# Patient Record
Sex: MALE | Race: WHITE
[De-identification: names, ages, dates, MRNs, and addresses within clinical notes are randomized per-mention and may not be internally consistent; named-entity substitution may affect disease eponyms.]

---

## 2018-04-26 ENCOUNTER — HOSPITAL ENCOUNTER (OUTPATIENT)
Age: 68
End: 2018-04-26
Payer: COMMERCIAL

## 2018-04-26 DIAGNOSIS — R07.81: Primary | ICD-10-CM

## 2018-04-26 PROCEDURE — 71101 X-RAY EXAM UNILAT RIBS/CHEST: CPT

## 2019-10-15 ENCOUNTER — HOSPITAL ENCOUNTER (OUTPATIENT)
Age: 69
End: 2019-10-15
Payer: COMMERCIAL

## 2019-10-15 DIAGNOSIS — M79.662: Primary | ICD-10-CM

## 2019-10-15 PROCEDURE — 93926 LOWER EXTREMITY STUDY: CPT

## 2019-10-15 PROCEDURE — 93971 EXTREMITY STUDY: CPT

## 2021-09-21 ENCOUNTER — HOSPITAL ENCOUNTER (OUTPATIENT)
Dept: HOSPITAL 22 - RT | Age: 71
End: 2021-09-21
Payer: MEDICARE

## 2021-09-21 DIAGNOSIS — R55: Primary | ICD-10-CM

## 2021-09-21 PROCEDURE — 93270 REMOTE 30 DAY ECG REV/REPORT: CPT

## 2021-09-29 ENCOUNTER — HOSPITAL ENCOUNTER (OUTPATIENT)
Dept: HOSPITAL 22 - RT | Age: 71
End: 2021-09-29
Payer: MEDICARE

## 2021-09-29 DIAGNOSIS — Z86.718: ICD-10-CM

## 2021-09-29 DIAGNOSIS — R55: Primary | ICD-10-CM

## 2021-09-29 DIAGNOSIS — I51.7: ICD-10-CM

## 2021-09-29 DIAGNOSIS — E66.01: ICD-10-CM

## 2021-09-29 PROCEDURE — 93306 TTE W/DOPPLER COMPLETE: CPT

## 2021-09-29 PROCEDURE — 93880 EXTRACRANIAL BILAT STUDY: CPT

## 2022-08-17 ENCOUNTER — OFFICE VISIT (OUTPATIENT)
Dept: FAMILY MEDICINE CLINIC | Facility: CLINIC | Age: 72
End: 2022-08-17

## 2022-08-17 VITALS
HEIGHT: 69 IN | BODY MASS INDEX: 39.28 KG/M2 | RESPIRATION RATE: 20 BRPM | WEIGHT: 265.2 LBS | SYSTOLIC BLOOD PRESSURE: 118 MMHG | OXYGEN SATURATION: 97 % | TEMPERATURE: 98.7 F | HEART RATE: 90 BPM | DIASTOLIC BLOOD PRESSURE: 78 MMHG

## 2022-08-17 DIAGNOSIS — G95.9 CERVICAL MYELOPATHY WITH CERVICAL RADICULOPATHY: Primary | ICD-10-CM

## 2022-08-17 DIAGNOSIS — M54.12 CERVICAL MYELOPATHY WITH CERVICAL RADICULOPATHY: Primary | ICD-10-CM

## 2022-08-17 DIAGNOSIS — S14.2XXD: ICD-10-CM

## 2022-08-17 PROBLEM — S14.2XXA: Status: ACTIVE | Noted: 2022-08-17

## 2022-08-17 PROBLEM — S14.2XXA: Status: RESOLVED | Noted: 2022-08-17 | Resolved: 2022-08-17

## 2022-08-17 PROCEDURE — 99204 OFFICE O/P NEW MOD 45 MIN: CPT | Performed by: FAMILY MEDICINE

## 2022-08-17 RX ORDER — PREDNISONE 20 MG/1
TABLET ORAL
COMMUNITY
Start: 2022-08-03 | End: 2022-09-22

## 2022-08-17 RX ORDER — GABAPENTIN 100 MG/1
100 CAPSULE ORAL 3 TIMES DAILY
Qty: 90 CAPSULE | Refills: 0 | Status: SHIPPED | OUTPATIENT
Start: 2022-08-17 | End: 2022-10-10

## 2022-08-17 RX ORDER — METHOCARBAMOL 500 MG/1
TABLET, FILM COATED ORAL
COMMUNITY
Start: 2022-08-03 | End: 2022-08-17

## 2022-08-17 RX ORDER — NAPROXEN SODIUM 220 MG
220 TABLET ORAL 2 TIMES DAILY PRN
COMMUNITY
End: 2023-03-13

## 2022-08-17 RX ORDER — HYDROCODONE BITARTRATE AND ACETAMINOPHEN 7.5; 325 MG/1; MG/1
TABLET ORAL
COMMUNITY
Start: 2022-08-03 | End: 2022-08-17
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
## 2022-08-22 ENCOUNTER — HOSPITAL ENCOUNTER (OUTPATIENT)
Dept: MRI IMAGING | Facility: HOSPITAL | Age: 72
Discharge: HOME | End: 2022-08-22
Admitting: FAMILY MEDICINE

## 2022-08-22 DIAGNOSIS — M48.02 NEUROFORAMINAL STENOSIS OF CERVICAL SPINE: ICD-10-CM

## 2022-08-22 DIAGNOSIS — S14.2XXD: ICD-10-CM

## 2022-08-22 DIAGNOSIS — M54.12 CERVICAL MYELOPATHY WITH CERVICAL RADICULOPATHY: ICD-10-CM

## 2022-08-22 DIAGNOSIS — G95.9 MYELOPATHY: ICD-10-CM

## 2022-08-22 DIAGNOSIS — G95.9 CERVICAL MYELOPATHY WITH CERVICAL RADICULOPATHY: ICD-10-CM

## 2022-08-22 DIAGNOSIS — M48.02 CERVICAL STENOSIS OF SPINE: Primary | ICD-10-CM

## 2022-08-22 PROCEDURE — 72141 MRI NECK SPINE W/O DYE: CPT

## 2022-09-22 ENCOUNTER — HOSPITAL ENCOUNTER (OUTPATIENT)
Dept: GENERAL RADIOLOGY | Facility: HOSPITAL | Age: 72
Discharge: HOME | End: 2022-09-22
Admitting: NEUROLOGICAL SURGERY

## 2022-09-22 ENCOUNTER — OFFICE VISIT (OUTPATIENT)
Dept: NEUROSURGERY | Facility: CLINIC | Age: 72
End: 2022-09-22

## 2022-09-22 VITALS
DIASTOLIC BLOOD PRESSURE: 82 MMHG | WEIGHT: 264.2 LBS | TEMPERATURE: 97.8 F | BODY MASS INDEX: 39.13 KG/M2 | SYSTOLIC BLOOD PRESSURE: 134 MMHG | HEIGHT: 69 IN

## 2022-09-22 DIAGNOSIS — M54.12 CERVICAL RADICULOPATHY: ICD-10-CM

## 2022-09-22 DIAGNOSIS — M50.30 DDD (DEGENERATIVE DISC DISEASE), CERVICAL: ICD-10-CM

## 2022-09-22 DIAGNOSIS — M50.30 DDD (DEGENERATIVE DISC DISEASE), CERVICAL: Primary | ICD-10-CM

## 2022-09-22 PROCEDURE — 72040 X-RAY EXAM NECK SPINE 2-3 VW: CPT

## 2022-09-22 PROCEDURE — 99204 OFFICE O/P NEW MOD 45 MIN: CPT | Performed by: NEUROLOGICAL SURGERY

## 2022-09-22 RX ORDER — PREGABALIN 75 MG/1
75 CAPSULE ORAL TAKE AS DIRECTED
Qty: 60 CAPSULE | Refills: 1 | Status: SHIPPED | OUTPATIENT
Start: 2022-09-22 | End: 2022-09-26 | Stop reason: SDUPTHER
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
## 2022-09-26 ENCOUNTER — TELEPHONE (OUTPATIENT)
Dept: NEUROSURGERY | Facility: CLINIC | Age: 72
End: 2022-09-26

## 2022-09-26 DIAGNOSIS — M50.30 DDD (DEGENERATIVE DISC DISEASE), CERVICAL: ICD-10-CM

## 2022-09-26 DIAGNOSIS — M54.12 CERVICAL RADICULOPATHY: ICD-10-CM

## 2022-09-26 RX ORDER — PREGABALIN 75 MG/1
75 CAPSULE ORAL 2 TIMES DAILY
Qty: 60 CAPSULE | Refills: 1 | Status: SHIPPED | OUTPATIENT
Start: 2022-09-26 | End: 2022-09-26

## 2022-09-26 RX ORDER — PREGABALIN 75 MG/1
75 CAPSULE ORAL TAKE AS DIRECTED
Qty: 60 CAPSULE | Refills: 1 | Status: SHIPPED | OUTPATIENT
Start: 2022-09-26 | End: 2022-09-26

## 2022-09-26 RX ORDER — PREGABALIN 75 MG/1
75 CAPSULE ORAL 2 TIMES DAILY
Qty: 60 CAPSULE | Refills: 1 | Status: SHIPPED | OUTPATIENT
Start: 2022-09-26 | End: 2023-03-13

## 2022-09-26 NOTE — TELEPHONE ENCOUNTER
Did he ever take lyrica, or are symptoms managed with gabapentin? If he never actually took the lyrica we need to refill the gabapentin instead

## 2022-09-26 NOTE — TELEPHONE ENCOUNTER
I have called the pharmacy and they do have this Rx. Can you resend this please?    Requested Prescriptions     Pending Prescriptions Disp Refills   • pregabalin (LYRICA) 75 MG capsule 60 capsule 1     Sig: Take 1 capsule by mouth Take As Directed. Take 1 tab PO daily x 1 week, BID thereafter  Discontinue Gabapentin     Patient ran out of the Gabapentin and does not have anything to take for the pain.  Please see note below.  Please Advise. Thank you.    DORON:11/30/2021 Gabapentin 100MG 1950 90 [facility] [address] 1  08/03/2022 Hydrocodone/Acetaminophen  325MG/7.5MG  1950 12 3 Neal [facility] [address] 30 1  08/19/2022 Gabapentin 100MG 1950 90 30 Gary Kaur [facility] [address] 1

## 2022-09-26 NOTE — TELEPHONE ENCOUNTER
Caller: Cj Cifuentes    Relationship: Self    Best call back number: [phone]    What is the best time to reach you:ANYTIME    Who are you requesting to speak with (clinical staff, provider,  specific staff member):CLINICAL STAFF    Do you know the name of the person who called:NA    What was the call regarding:PT CALLED TO CHECK ON HIS MEDICATION THAT  PRESCRIBED HIM-LYRICA-PT STATES THAT HE RAN OUT OF GABAPENTIN OVER THE WEEKEND-PT WOULD LIKE A CALLBACK THANK YOU    Do you require a callback:YES

## 2022-09-26 NOTE — TELEPHONE ENCOUNTER
"Attempted to contact patient but no answer. LVM that we sent in an RX for Lyrica.     Do we need to change the sig of Lyrica because patient completed Gabapentin this weekend?    Current sig:    \"Sig: Take 1 capsule by mouth 2 (Two) Times a Day. Take 1 tab PO daily x 1 week, BID thereafter Discontinue Gabapentin\"  "

## 2022-10-10 ENCOUNTER — OFFICE VISIT (OUTPATIENT)
Dept: FAMILY MEDICINE CLINIC | Facility: CLINIC | Age: 72
End: 2022-10-10

## 2022-10-10 VITALS
BODY MASS INDEX: 39.37 KG/M2 | HEIGHT: 69 IN | SYSTOLIC BLOOD PRESSURE: 160 MMHG | RESPIRATION RATE: 20 BRPM | TEMPERATURE: 98.4 F | DIASTOLIC BLOOD PRESSURE: 90 MMHG | HEART RATE: 112 BPM | WEIGHT: 265.8 LBS | OXYGEN SATURATION: 100 %

## 2022-10-10 DIAGNOSIS — R39.15 URINARY URGENCY: ICD-10-CM

## 2022-10-10 DIAGNOSIS — N40.1 ENLARGED PROSTATE WITH URINARY OBSTRUCTION: ICD-10-CM

## 2022-10-10 DIAGNOSIS — E11.65 TYPE 2 DIABETES MELLITUS WITH HYPERGLYCEMIA, WITHOUT LONG-TERM CURRENT USE OF INSULIN: ICD-10-CM

## 2022-10-10 DIAGNOSIS — R81 GLUCOSURIA: ICD-10-CM

## 2022-10-10 DIAGNOSIS — N41.0 ACUTE PROSTATITIS: Primary | ICD-10-CM

## 2022-10-10 DIAGNOSIS — N13.8 ENLARGED PROSTATE WITH URINARY OBSTRUCTION: ICD-10-CM

## 2022-10-10 LAB
BILIRUB BLD-MCNC: NEGATIVE MG/DL
CLARITY, POC: CLEAR
COLOR UR: YELLOW
GLUCOSE BLDC GLUCOMTR-MCNC: 390 MG/DL (ref 70–130)
GLUCOSE UR STRIP-MCNC: ABNORMAL MG/DL
KETONES UR QL: NEGATIVE
LEUKOCYTE EST, POC: NEGATIVE
NITRITE UR-MCNC: NEGATIVE MG/ML
PH UR: 6 [PH] (ref 5–8)
PROT UR STRIP-MCNC: NEGATIVE MG/DL
RBC # UR STRIP: ABNORMAL /UL
SP GR UR: 1.01 (ref 1–1.03)
UROBILINOGEN UR QL: NORMAL

## 2022-10-10 PROCEDURE — 82962 GLUCOSE BLOOD TEST: CPT | Performed by: FAMILY MEDICINE

## 2022-10-10 PROCEDURE — 99214 OFFICE O/P EST MOD 30 MIN: CPT | Performed by: FAMILY MEDICINE

## 2022-10-10 PROCEDURE — 81002 URINALYSIS NONAUTO W/O SCOPE: CPT | Performed by: FAMILY MEDICINE

## 2022-10-10 RX ORDER — LEVOFLOXACIN 500 MG/1
500 TABLET, FILM COATED ORAL DAILY
Qty: 14 TABLET | Refills: 0 | Status: SHIPPED | OUTPATIENT
Start: 2022-10-10 | End: 2022-10-10

## 2022-10-10 RX ORDER — TAMSULOSIN HYDROCHLORIDE 0.4 MG/1
1 CAPSULE ORAL DAILY
Qty: 30 CAPSULE | Refills: 11 | Status: SHIPPED | OUTPATIENT
Start: 2022-10-10 | End: 2022-10-10

## 2022-10-10 RX ORDER — LEVOFLOXACIN 500 MG/1
500 TABLET, FILM COATED ORAL DAILY
Qty: 14 TABLET | Refills: 0 | Status: SHIPPED | OUTPATIENT
Start: 2022-10-10 | End: 2022-10-20 | Stop reason: HOSPADM

## 2022-10-10 RX ORDER — TAMSULOSIN HYDROCHLORIDE 0.4 MG/1
1 CAPSULE ORAL DAILY
Qty: 30 CAPSULE | Refills: 11 | Status: ON HOLD | OUTPATIENT
Start: 2022-10-10 | End: 2022-10-20 | Stop reason: SDUPTHER

## 2022-10-10 NOTE — PROGRESS NOTES
"Chief Complaint   Patient presents with   • low back pain, strong odor to urine & urgency      Since Wed Subjective      Cj Cifuentes is a 72 y.o. who presents for at least 1 week of episodes of incontinence along with poor emptying of the bladder and urinary hesitancy.  Patient has now developed pain in the bilateral flanks.  He denies fevers or chills.  He does have some rectal pain.  He is accompanied by his wife who raises concern over his prostate which apparently she encouraged him to get examined in the past but this has not happened.       Review of Systems    Objective   Vital Signs:  /90   Pulse 112   Temp 98.4 °F (36.9 °C)   Resp 20   Ht 175.3 cm (69.02\")   Wt 121 kg (265 lb 12.8 oz)   SpO2 100%   BMI 39.23 kg/m²             Physical Exam  Cardiovascular:      Rate and Rhythm: Normal rate and regular rhythm.      Pulses: Normal pulses.      Heart sounds: Normal heart sounds.   Pulmonary:      Effort: Pulmonary effort is normal.      Breath sounds: Normal breath sounds.   Genitourinary:     Prostate: Enlarged and tender.   Neurological:      Mental Status: He is alert.          Result Review                     Assessment and Plan  Diagnoses and all orders for this visit:    1. Acute prostatitis (Primary)  -     CBC Auto Differential  -     Discontinue: levoFLOXacin (Levaquin) 500 MG tablet; Take 1 tablet by mouth Daily.  Dispense: 14 tablet; Refill: 0  -     Discontinue: tamsulosin (FLOMAX) 0.4 MG capsule 24 hr capsule; Take 1 capsule by mouth Daily.  Dispense: 30 capsule; Refill: 11  -     tamsulosin (FLOMAX) 0.4 MG capsule 24 hr capsule; Take 1 capsule by mouth Daily.  Dispense: 30 capsule; Refill: 11  -     levoFLOXacin (Levaquin) 500 MG tablet; Take 1 tablet by mouth Daily. Fill ASAP  Dispense: 14 tablet; Refill: 0    2. Urinary urgency  -     POC Urinalysis Dipstick  -     Urine Culture - Urine, Urine, Clean Catch    3. Glucosuria  -     POC Glucose    4. Type 2 diabetes " mellitus with hyperglycemia, without long-term current use of insulin (HCC)  -     Comprehensive Metabolic Panel  -     Lipid Panel  -     Hemoglobin A1c    5. Enlarged prostate with urinary obstruction  -     Discontinue: tamsulosin (FLOMAX) 0.4 MG capsule 24 hr capsule; Take 1 capsule by mouth Daily.  Dispense: 30 capsule; Refill: 11  -     tamsulosin (FLOMAX) 0.4 MG capsule 24 hr capsule; Take 1 capsule by mouth Daily.  Dispense: 30 capsule; Refill: 11    MDM: Moderate complexity  Patient will be started on levofloxacin for his acute prostatitis.  CBC ordered  Patient will be started on tamsulosin for his BPH  Patient is newly diagnosed diabetic.  A1c, lipid panel, CMP has been ordered  While patient is initiating treatment he has been instructed to go to the emergency department should he develop a fever or should his pain increase.  Patient will be contacted with lab results and started on hypoglycemics.        Follow Up  No follow-ups on file.  Patient was given instructions and counseling regarding his condition or for health maintenance advice. Please see specific information pulled into the AVS if appropriate.

## 2022-10-13 LAB
ALBUMIN SERPL-MCNC: 4.6 G/DL (ref 3.8–4.8)
ALBUMIN/GLOB SERPL: 2.1 {RATIO} (ref 1.2–2.2)
ALP SERPL-CCNC: 92 IU/L (ref 44–121)
ALT SERPL-CCNC: 20 IU/L (ref 0–44)
AST SERPL-CCNC: 27 IU/L (ref 0–40)
BACTERIA UR CULT: ABNORMAL
BACTERIA UR CULT: ABNORMAL
BASOPHILS # BLD AUTO: 0 X10E3/UL (ref 0–0.2)
BASOPHILS NFR BLD AUTO: 0 %
BILIRUB SERPL-MCNC: 1.8 MG/DL (ref 0–1.2)
BUN SERPL-MCNC: 34 MG/DL (ref 8–27)
BUN/CREAT SERPL: 12 (ref 10–24)
CALCIUM SERPL-MCNC: 9.3 MG/DL (ref 8.6–10.2)
CHLORIDE SERPL-SCNC: 95 MMOL/L (ref 96–106)
CHOLEST SERPL-MCNC: 196 MG/DL (ref 100–199)
CO2 SERPL-SCNC: 17 MMOL/L (ref 20–29)
CREAT SERPL-MCNC: 2.84 MG/DL (ref 0.76–1.27)
EGFRCR SERPLBLD CKD-EPI 2021: 23 ML/MIN/1.73
EOSINOPHIL # BLD AUTO: 0 X10E3/UL (ref 0–0.4)
EOSINOPHIL NFR BLD AUTO: 0 %
ERYTHROCYTE [DISTWIDTH] IN BLOOD BY AUTOMATED COUNT: 12.7 % (ref 11.6–15.4)
GLOBULIN SER CALC-MCNC: 2.2 G/DL (ref 1.5–4.5)
GLUCOSE SERPL-MCNC: 402 MG/DL (ref 70–99)
HBA1C MFR BLD: 10.3 % (ref 4.8–5.6)
HCT VFR BLD AUTO: 48.9 % (ref 37.5–51)
HDLC SERPL-MCNC: 36 MG/DL
HGB BLD-MCNC: 16.8 G/DL (ref 13–17.7)
IMM GRANULOCYTES # BLD AUTO: 0.1 X10E3/UL (ref 0–0.1)
IMM GRANULOCYTES NFR BLD AUTO: 1 %
LDLC SERPL CALC-MCNC: 109 MG/DL (ref 0–99)
LYMPHOCYTES # BLD AUTO: 1.3 X10E3/UL (ref 0.7–3.1)
LYMPHOCYTES NFR BLD AUTO: 7 %
MCH RBC QN AUTO: 30.1 PG (ref 26.6–33)
MCHC RBC AUTO-ENTMCNC: 34.4 G/DL (ref 31.5–35.7)
MCV RBC AUTO: 88 FL (ref 79–97)
MONOCYTES # BLD AUTO: 1.7 X10E3/UL (ref 0.1–0.9)
MONOCYTES NFR BLD AUTO: 9 %
NEUTROPHILS # BLD AUTO: 16.3 X10E3/UL (ref 1.4–7)
NEUTROPHILS NFR BLD AUTO: 83 %
PLATELET # BLD AUTO: 261 X10E3/UL (ref 150–450)
POTASSIUM SERPL-SCNC: 4.4 MMOL/L (ref 3.5–5.2)
PROT SERPL-MCNC: 6.8 G/DL (ref 6–8.5)
RBC # BLD AUTO: 5.59 X10E6/UL (ref 4.14–5.8)
SODIUM SERPL-SCNC: 132 MMOL/L (ref 134–144)
TRIGL SERPL-MCNC: 299 MG/DL (ref 0–149)
VLDLC SERPL CALC-MCNC: 51 MG/DL (ref 5–40)
WBC # BLD AUTO: 19.5 X10E3/UL (ref 3.4–10.8)

## 2022-10-14 ENCOUNTER — DOCUMENTATION (OUTPATIENT)
Dept: FAMILY MEDICINE CLINIC | Facility: CLINIC | Age: 72
End: 2022-10-14

## 2022-10-16 ENCOUNTER — APPOINTMENT (OUTPATIENT)
Dept: CT IMAGING | Facility: HOSPITAL | Age: 72
End: 2022-10-16

## 2022-10-16 ENCOUNTER — HOSPITAL ENCOUNTER (INPATIENT)
Facility: HOSPITAL | Age: 72
LOS: 4 days | Discharge: HOME | End: 2022-10-20
Attending: STUDENT IN AN ORGANIZED HEALTH CARE EDUCATION/TRAINING PROGRAM | Admitting: INTERNAL MEDICINE

## 2022-10-16 DIAGNOSIS — N13.8 ENLARGED PROSTATE WITH URINARY OBSTRUCTION: ICD-10-CM

## 2022-10-16 DIAGNOSIS — R33.8 ACUTE URINARY RETENTION: ICD-10-CM

## 2022-10-16 DIAGNOSIS — N17.9 ACUTE RENAL FAILURE, UNSPECIFIED ACUTE RENAL FAILURE TYPE: Primary | ICD-10-CM

## 2022-10-16 DIAGNOSIS — N41.0 ACUTE PROSTATITIS: ICD-10-CM

## 2022-10-16 DIAGNOSIS — N39.0 ACUTE UTI (URINARY TRACT INFECTION): ICD-10-CM

## 2022-10-16 DIAGNOSIS — N13.9 OBSTRUCTIVE UROPATHY: ICD-10-CM

## 2022-10-16 DIAGNOSIS — R33.8 BENIGN PROSTATIC HYPERPLASIA WITH URINARY RETENTION: ICD-10-CM

## 2022-10-16 DIAGNOSIS — E11.9 NEWLY DIAGNOSED DIABETES: ICD-10-CM

## 2022-10-16 DIAGNOSIS — N40.1 ENLARGED PROSTATE WITH URINARY OBSTRUCTION: ICD-10-CM

## 2022-10-16 DIAGNOSIS — N40.1 BENIGN PROSTATIC HYPERPLASIA WITH URINARY RETENTION: ICD-10-CM

## 2022-10-16 DIAGNOSIS — E66.9 OBESITY (BMI 30-39.9): Chronic | ICD-10-CM

## 2022-10-16 PROBLEM — E83.39 HYPERPHOSPHATEMIA: Status: ACTIVE | Noted: 2022-10-16

## 2022-10-16 PROBLEM — K46.9 HERNIA, ABDOMINAL: Status: ACTIVE | Noted: 2022-10-16

## 2022-10-16 PROBLEM — E83.41 HYPERMAGNESEMIA: Status: ACTIVE | Noted: 2022-10-16

## 2022-10-16 PROBLEM — N41.9 PROSTATITIS: Status: ACTIVE | Noted: 2022-10-16

## 2022-10-16 PROBLEM — R74.8 ELEVATED LIPASE: Status: ACTIVE | Noted: 2022-10-16

## 2022-10-16 PROBLEM — N40.0 BPH (BENIGN PROSTATIC HYPERPLASIA): Status: ACTIVE | Noted: 2022-10-16

## 2022-10-16 LAB
ALBUMIN SERPL-MCNC: 3 G/DL (ref 3.5–5.2)
ALBUMIN SERPL-MCNC: 3.5 G/DL (ref 3.5–5.2)
ALBUMIN/GLOB SERPL: 1.1 G/DL
ALP SERPL-CCNC: 116 U/L (ref 39–117)
ALT SERPL W P-5'-P-CCNC: 23 U/L (ref 1–41)
ANION GAP SERPL CALCULATED.3IONS-SCNC: 13 MMOL/L (ref 5–15)
ANION GAP SERPL CALCULATED.3IONS-SCNC: 16 MMOL/L (ref 5–15)
AST SERPL-CCNC: 15 U/L (ref 1–40)
ATMOSPHERIC PRESS: ABNORMAL MM[HG]
BACTERIA UR QL AUTO: ABNORMAL /HPF
BASE EXCESS BLDV CALC-SCNC: -3.1 MMOL/L (ref -2–2)
BASOPHILS # BLD AUTO: 0.08 10*3/MM3 (ref 0–0.2)
BASOPHILS NFR BLD AUTO: 0.5 % (ref 0–1.5)
BDY SITE: ABNORMAL
BILIRUB SERPL-MCNC: 0.6 MG/DL (ref 0–1.2)
BILIRUB UR QL STRIP: NEGATIVE
BODY TEMPERATURE: 37 C
BUN SERPL-MCNC: 76 MG/DL (ref 8–23)
BUN SERPL-MCNC: 95 MG/DL (ref 8–23)
BUN/CREAT SERPL: 15.7 (ref 7–25)
BUN/CREAT SERPL: 20.9 (ref 7–25)
CALCIUM SPEC-SCNC: 8.8 MG/DL (ref 8.6–10.5)
CALCIUM SPEC-SCNC: 9.1 MG/DL (ref 8.6–10.5)
CHLORIDE SERPL-SCNC: 87 MMOL/L (ref 98–107)
CHLORIDE SERPL-SCNC: 99 MMOL/L (ref 98–107)
CLARITY UR: CLEAR
CO2 BLDA-SCNC: 24.4 MMOL/L (ref 22–33)
CO2 SERPL-SCNC: 19 MMOL/L (ref 22–29)
CO2 SERPL-SCNC: 21 MMOL/L (ref 22–29)
COHGB MFR BLD: 1.2 %
COLOR UR: YELLOW
CREAT SERPL-MCNC: 3.64 MG/DL (ref 0.76–1.27)
CREAT SERPL-MCNC: 6.06 MG/DL (ref 0.76–1.27)
CREAT UR-MCNC: 39.9 MG/DL
CRP SERPL-MCNC: 19.32 MG/DL (ref 0–0.5)
D-LACTATE SERPL-SCNC: 1.1 MMOL/L (ref 0.5–2)
DEPRECATED RDW RBC AUTO: 36.4 FL (ref 37–54)
EGFRCR SERPLBLD CKD-EPI 2021: 17.2 ML/MIN/1.73
EGFRCR SERPLBLD CKD-EPI 2021: 9.3 ML/MIN/1.73
EOSINOPHIL # BLD AUTO: 0.22 10*3/MM3 (ref 0–0.4)
EOSINOPHIL NFR BLD AUTO: 1.3 % (ref 0.3–6.2)
EPAP: 0
ERYTHROCYTE [DISTWIDTH] IN BLOOD BY AUTOMATED COUNT: 12 % (ref 12.3–15.4)
GLOBULIN UR ELPH-MCNC: 3.3 GM/DL
GLUCOSE BLDC GLUCOMTR-MCNC: 337 MG/DL (ref 70–130)
GLUCOSE BLDC GLUCOMTR-MCNC: 357 MG/DL (ref 70–130)
GLUCOSE BLDC GLUCOMTR-MCNC: 364 MG/DL (ref 70–130)
GLUCOSE BLDC GLUCOMTR-MCNC: 415 MG/DL (ref 70–130)
GLUCOSE SERPL-MCNC: 339 MG/DL (ref 65–99)
GLUCOSE SERPL-MCNC: 484 MG/DL (ref 65–99)
GLUCOSE UR STRIP-MCNC: ABNORMAL MG/DL
HBA1C MFR BLD: 10.6 % (ref 4.8–5.6)
HCO3 BLDV-SCNC: 23 MMOL/L (ref 22–28)
HCT VFR BLD AUTO: 41.8 % (ref 37.5–51)
HGB BLD-MCNC: 15 G/DL (ref 13–17.7)
HGB BLDA-MCNC: 14.3 G/DL (ref 13.5–17.5)
HGB UR QL STRIP.AUTO: ABNORMAL
HOLD SPECIMEN: NORMAL
HYALINE CASTS UR QL AUTO: ABNORMAL /LPF
IMM GRANULOCYTES # BLD AUTO: 0.24 10*3/MM3 (ref 0–0.05)
IMM GRANULOCYTES NFR BLD AUTO: 1.5 % (ref 0–0.5)
INHALED O2 CONCENTRATION: 21 %
IPAP: 0
KETONES UR QL STRIP: NEGATIVE
LEUKOCYTE ESTERASE UR QL STRIP.AUTO: ABNORMAL
LIPASE SERPL-CCNC: 723 U/L (ref 13–60)
LYMPHOCYTES # BLD AUTO: 1.09 10*3/MM3 (ref 0.7–3.1)
LYMPHOCYTES NFR BLD AUTO: 6.6 % (ref 19.6–45.3)
MAGNESIUM SERPL-MCNC: 2.6 MG/DL (ref 1.6–2.4)
MCH RBC QN AUTO: 30.4 PG (ref 26.6–33)
MCHC RBC AUTO-ENTMCNC: 35.9 G/DL (ref 31.5–35.7)
MCV RBC AUTO: 84.6 FL (ref 79–97)
METHGB BLD QL: 0.5 %
MODALITY: ABNORMAL
MONOCYTES # BLD AUTO: 1.51 10*3/MM3 (ref 0.1–0.9)
MONOCYTES NFR BLD AUTO: 9.2 % (ref 5–12)
NEUTROPHILS NFR BLD AUTO: 13.26 10*3/MM3 (ref 1.7–7)
NEUTROPHILS NFR BLD AUTO: 80.9 % (ref 42.7–76)
NITRITE UR QL STRIP: NEGATIVE
NOTE: ABNORMAL
NRBC BLD AUTO-RTO: 0 /100 WBC (ref 0–0.2)
OSMOLALITY SERPL: 318 MOSM/KG (ref 275–295)
OSMOLALITY UR: 316 MOSM/KG (ref 300–1100)
OXYHGB MFR BLDV: 50.8 %
PAW @ PEAK INSP FLOW SETTING VENT: 0 CMH2O
PCO2 BLDV: 44 MM HG (ref 41–51)
PH BLDV: 7.33 PH UNITS (ref 7.31–7.41)
PH UR STRIP.AUTO: <=5 [PH] (ref 5–8)
PHOSPHATE SERPL-MCNC: 4.4 MG/DL (ref 2.5–4.5)
PHOSPHATE SERPL-MCNC: 5.4 MG/DL (ref 2.5–4.5)
PLATELET # BLD AUTO: 159 10*3/MM3 (ref 140–450)
PMV BLD AUTO: 9.9 FL (ref 6–12)
PO2 BLDV: 30.5 MM HG (ref 27–53)
POTASSIUM SERPL-SCNC: 4.6 MMOL/L (ref 3.5–5.2)
POTASSIUM SERPL-SCNC: 4.9 MMOL/L (ref 3.5–5.2)
PROCALCITONIN SERPL-MCNC: 0.21 NG/ML (ref 0–0.25)
PROT SERPL-MCNC: 6.8 G/DL (ref 6–8.5)
PROT UR QL STRIP: NEGATIVE
RBC # BLD AUTO: 4.94 10*6/MM3 (ref 4.14–5.8)
RBC # UR STRIP: ABNORMAL /HPF
REF LAB TEST METHOD: ABNORMAL
SODIUM SERPL-SCNC: 122 MMOL/L (ref 136–145)
SODIUM SERPL-SCNC: 133 MMOL/L (ref 136–145)
SODIUM UR-SCNC: 21 MMOL/L
SP GR UR STRIP: 1.01 (ref 1–1.03)
SQUAMOUS #/AREA URNS HPF: ABNORMAL /HPF
T4 FREE SERPL-MCNC: 0.92 NG/DL (ref 0.93–1.7)
TOTAL RATE: 0 BREATHS/MINUTE
TROPONIN T SERPL-MCNC: 0.02 NG/ML (ref 0–0.03)
TSH SERPL DL<=0.05 MIU/L-ACNC: 6.39 UIU/ML (ref 0.27–4.2)
UROBILINOGEN UR QL STRIP: ABNORMAL
UUN 24H UR-MCNC: 442 MG/DL
WBC # UR STRIP: ABNORMAL /HPF
WBC NRBC COR # BLD: 16.4 10*3/MM3 (ref 3.4–10.8)
WHOLE BLOOD HOLD COAG: NORMAL
WHOLE BLOOD HOLD SPECIMEN: NORMAL

## 2022-10-16 PROCEDURE — 84145 PROCALCITONIN (PCT): CPT | Performed by: STUDENT IN AN ORGANIZED HEALTH CARE EDUCATION/TRAINING PROGRAM

## 2022-10-16 PROCEDURE — 84540 ASSAY OF URINE/UREA-N: CPT | Performed by: INTERNAL MEDICINE

## 2022-10-16 PROCEDURE — 86140 C-REACTIVE PROTEIN: CPT | Performed by: STUDENT IN AN ORGANIZED HEALTH CARE EDUCATION/TRAINING PROGRAM

## 2022-10-16 PROCEDURE — 99285 EMERGENCY DEPT VISIT HI MDM: CPT

## 2022-10-16 PROCEDURE — 99223 1ST HOSP IP/OBS HIGH 75: CPT | Performed by: INTERNAL MEDICINE

## 2022-10-16 PROCEDURE — 82962 GLUCOSE BLOOD TEST: CPT

## 2022-10-16 PROCEDURE — 87086 URINE CULTURE/COLONY COUNT: CPT | Performed by: INTERNAL MEDICINE

## 2022-10-16 PROCEDURE — 36415 COLL VENOUS BLD VENIPUNCTURE: CPT

## 2022-10-16 PROCEDURE — 84300 ASSAY OF URINE SODIUM: CPT | Performed by: INTERNAL MEDICINE

## 2022-10-16 PROCEDURE — 83930 ASSAY OF BLOOD OSMOLALITY: CPT | Performed by: INTERNAL MEDICINE

## 2022-10-16 PROCEDURE — 51702 INSERT TEMP BLADDER CATH: CPT

## 2022-10-16 PROCEDURE — 85025 COMPLETE CBC W/AUTO DIFF WBC: CPT | Performed by: STUDENT IN AN ORGANIZED HEALTH CARE EDUCATION/TRAINING PROGRAM

## 2022-10-16 PROCEDURE — 83935 ASSAY OF URINE OSMOLALITY: CPT | Performed by: INTERNAL MEDICINE

## 2022-10-16 PROCEDURE — 83605 ASSAY OF LACTIC ACID: CPT | Performed by: STUDENT IN AN ORGANIZED HEALTH CARE EDUCATION/TRAINING PROGRAM

## 2022-10-16 PROCEDURE — 63710000001 INSULIN LISPRO (HUMAN) PER 5 UNITS: Performed by: INTERNAL MEDICINE

## 2022-10-16 PROCEDURE — 25010000002 HEPARIN (PORCINE) PER 1000 UNITS: Performed by: INTERNAL MEDICINE

## 2022-10-16 PROCEDURE — 25010000002 CEFTRIAXONE PER 250 MG: Performed by: INTERNAL MEDICINE

## 2022-10-16 PROCEDURE — 82570 ASSAY OF URINE CREATININE: CPT | Performed by: INTERNAL MEDICINE

## 2022-10-16 PROCEDURE — 84100 ASSAY OF PHOSPHORUS: CPT | Performed by: STUDENT IN AN ORGANIZED HEALTH CARE EDUCATION/TRAINING PROGRAM

## 2022-10-16 PROCEDURE — 82805 BLOOD GASES W/O2 SATURATION: CPT

## 2022-10-16 PROCEDURE — 83735 ASSAY OF MAGNESIUM: CPT | Performed by: STUDENT IN AN ORGANIZED HEALTH CARE EDUCATION/TRAINING PROGRAM

## 2022-10-16 PROCEDURE — 74176 CT ABD & PELVIS W/O CONTRAST: CPT

## 2022-10-16 PROCEDURE — 83690 ASSAY OF LIPASE: CPT | Performed by: STUDENT IN AN ORGANIZED HEALTH CARE EDUCATION/TRAINING PROGRAM

## 2022-10-16 PROCEDURE — 63710000001 INSULIN DETEMIR PER 5 UNITS: Performed by: INTERNAL MEDICINE

## 2022-10-16 PROCEDURE — 83036 HEMOGLOBIN GLYCOSYLATED A1C: CPT | Performed by: INTERNAL MEDICINE

## 2022-10-16 PROCEDURE — 84439 ASSAY OF FREE THYROXINE: CPT | Performed by: STUDENT IN AN ORGANIZED HEALTH CARE EDUCATION/TRAINING PROGRAM

## 2022-10-16 PROCEDURE — 84484 ASSAY OF TROPONIN QUANT: CPT | Performed by: STUDENT IN AN ORGANIZED HEALTH CARE EDUCATION/TRAINING PROGRAM

## 2022-10-16 PROCEDURE — 87040 BLOOD CULTURE FOR BACTERIA: CPT | Performed by: STUDENT IN AN ORGANIZED HEALTH CARE EDUCATION/TRAINING PROGRAM

## 2022-10-16 PROCEDURE — 25010000002 CEFTRIAXONE PER 250 MG: Performed by: STUDENT IN AN ORGANIZED HEALTH CARE EDUCATION/TRAINING PROGRAM

## 2022-10-16 PROCEDURE — 84443 ASSAY THYROID STIM HORMONE: CPT | Performed by: STUDENT IN AN ORGANIZED HEALTH CARE EDUCATION/TRAINING PROGRAM

## 2022-10-16 PROCEDURE — 81001 URINALYSIS AUTO W/SCOPE: CPT | Performed by: STUDENT IN AN ORGANIZED HEALTH CARE EDUCATION/TRAINING PROGRAM

## 2022-10-16 PROCEDURE — 80053 COMPREHEN METABOLIC PANEL: CPT | Performed by: STUDENT IN AN ORGANIZED HEALTH CARE EDUCATION/TRAINING PROGRAM

## 2022-10-16 RX ORDER — LEVOFLOXACIN 5 MG/ML
750 INJECTION, SOLUTION INTRAVENOUS ONCE
Status: DISCONTINUED | OUTPATIENT
Start: 2022-10-16 | End: 2022-10-16 | Stop reason: ALTCHOICE

## 2022-10-16 RX ORDER — TRAMADOL HYDROCHLORIDE 50 MG/1
50 TABLET ORAL EVERY 12 HOURS PRN
Status: DISCONTINUED | OUTPATIENT
Start: 2022-10-16 | End: 2022-10-20 | Stop reason: HOSPADM

## 2022-10-16 RX ORDER — SODIUM CHLORIDE 0.9 % (FLUSH) 0.9 %
10 SYRINGE (ML) INJECTION EVERY 12 HOURS SCHEDULED
Status: DISCONTINUED | OUTPATIENT
Start: 2022-10-16 | End: 2022-10-20 | Stop reason: HOSPADM

## 2022-10-16 RX ORDER — SODIUM CHLORIDE 9 MG/ML
75 INJECTION, SOLUTION INTRAVENOUS CONTINUOUS
Status: DISCONTINUED | OUTPATIENT
Start: 2022-10-16 | End: 2022-10-16

## 2022-10-16 RX ORDER — ACETAMINOPHEN 325 MG/1
650 TABLET ORAL EVERY 4 HOURS PRN
Status: DISCONTINUED | OUTPATIENT
Start: 2022-10-16 | End: 2022-10-20 | Stop reason: HOSPADM

## 2022-10-16 RX ORDER — INSULIN LISPRO 100 [IU]/ML
0-7 INJECTION, SOLUTION INTRAVENOUS; SUBCUTANEOUS
Status: DISCONTINUED | OUTPATIENT
Start: 2022-10-16 | End: 2022-10-20 | Stop reason: HOSPADM

## 2022-10-16 RX ORDER — ACETAMINOPHEN 650 MG/1
650 SUPPOSITORY RECTAL EVERY 4 HOURS PRN
Status: DISCONTINUED | OUTPATIENT
Start: 2022-10-16 | End: 2022-10-20 | Stop reason: HOSPADM

## 2022-10-16 RX ORDER — PREGABALIN 25 MG/1
25 CAPSULE ORAL DAILY
Status: DISCONTINUED | OUTPATIENT
Start: 2022-10-16 | End: 2022-10-20 | Stop reason: HOSPADM

## 2022-10-16 RX ORDER — SODIUM CHLORIDE 9 MG/ML
10 INJECTION INTRAVENOUS AS NEEDED
Status: DISCONTINUED | OUTPATIENT
Start: 2022-10-16 | End: 2022-10-20 | Stop reason: HOSPADM

## 2022-10-16 RX ORDER — HEPARIN SODIUM 5000 [USP'U]/ML
5000 INJECTION, SOLUTION INTRAVENOUS; SUBCUTANEOUS EVERY 8 HOURS SCHEDULED
Status: DISCONTINUED | OUTPATIENT
Start: 2022-10-16 | End: 2022-10-20 | Stop reason: HOSPADM

## 2022-10-16 RX ORDER — NICOTINE 21 MG/24HR
1 PATCH, TRANSDERMAL 24 HOURS TRANSDERMAL EVERY 24 HOURS
Status: DISCONTINUED | OUTPATIENT
Start: 2022-10-16 | End: 2022-10-20 | Stop reason: HOSPADM

## 2022-10-16 RX ORDER — TAMSULOSIN HYDROCHLORIDE 0.4 MG/1
0.4 CAPSULE ORAL DAILY
Status: DISCONTINUED | OUTPATIENT
Start: 2022-10-16 | End: 2022-10-20 | Stop reason: HOSPADM

## 2022-10-16 RX ORDER — NYSTATIN 100000 [USP'U]/G
POWDER TOPICAL EVERY 12 HOURS SCHEDULED
Status: DISCONTINUED | OUTPATIENT
Start: 2022-10-16 | End: 2022-10-20 | Stop reason: HOSPADM

## 2022-10-16 RX ORDER — INSULIN LISPRO 100 [IU]/ML
3 INJECTION, SOLUTION INTRAVENOUS; SUBCUTANEOUS
Status: DISCONTINUED | OUTPATIENT
Start: 2022-10-16 | End: 2022-10-20 | Stop reason: HOSPADM

## 2022-10-16 RX ORDER — NICOTINE POLACRILEX 4 MG
15 LOZENGE BUCCAL
Status: DISCONTINUED | OUTPATIENT
Start: 2022-10-16 | End: 2022-10-20 | Stop reason: HOSPADM

## 2022-10-16 RX ORDER — SODIUM CHLORIDE 0.9 % (FLUSH) 0.9 %
10 SYRINGE (ML) INJECTION AS NEEDED
Status: DISCONTINUED | OUTPATIENT
Start: 2022-10-16 | End: 2022-10-20 | Stop reason: HOSPADM

## 2022-10-16 RX ORDER — DEXTROSE MONOHYDRATE 25 G/50ML
25 INJECTION, SOLUTION INTRAVENOUS
Status: DISCONTINUED | OUTPATIENT
Start: 2022-10-16 | End: 2022-10-20 | Stop reason: HOSPADM

## 2022-10-16 RX ORDER — ACETAMINOPHEN 160 MG/5ML
650 SOLUTION ORAL EVERY 4 HOURS PRN
Status: DISCONTINUED | OUTPATIENT
Start: 2022-10-16 | End: 2022-10-20 | Stop reason: HOSPADM

## 2022-10-16 RX ADMIN — INSULIN DETEMIR 5 UNITS: 100 INJECTION, SOLUTION SUBCUTANEOUS at 11:05

## 2022-10-16 RX ADMIN — HEPARIN SODIUM 5000 UNITS: 5000 INJECTION INTRAVENOUS; SUBCUTANEOUS at 14:24

## 2022-10-16 RX ADMIN — INSULIN LISPRO 6 UNITS: 100 INJECTION, SOLUTION INTRAVENOUS; SUBCUTANEOUS at 11:07

## 2022-10-16 RX ADMIN — TAMSULOSIN HYDROCHLORIDE 0.4 MG: 0.4 CAPSULE ORAL at 12:04

## 2022-10-16 RX ADMIN — ACETAMINOPHEN 650 MG: 325 TABLET, FILM COATED ORAL at 14:25

## 2022-10-16 RX ADMIN — Medication 10 ML: at 14:29

## 2022-10-16 RX ADMIN — INSULIN DETEMIR 5 UNITS: 100 INJECTION, SOLUTION SUBCUTANEOUS at 21:52

## 2022-10-16 RX ADMIN — SODIUM CHLORIDE 75 ML/HR: 9 INJECTION, SOLUTION INTRAVENOUS at 14:38

## 2022-10-16 RX ADMIN — NYSTATIN: 100000 POWDER TOPICAL at 05:47

## 2022-10-16 RX ADMIN — SODIUM CHLORIDE 1 G: 900 INJECTION INTRAVENOUS at 07:52

## 2022-10-16 RX ADMIN — TRAMADOL HYDROCHLORIDE 50 MG: 50 TABLET, COATED ORAL at 07:58

## 2022-10-16 RX ADMIN — SODIUM CHLORIDE 1 G: 900 INJECTION INTRAVENOUS at 05:47

## 2022-10-16 RX ADMIN — HEPARIN SODIUM 5000 UNITS: 5000 INJECTION INTRAVENOUS; SUBCUTANEOUS at 21:51

## 2022-10-16 RX ADMIN — NYSTATIN: 100000 POWDER TOPICAL at 21:51

## 2022-10-16 RX ADMIN — INSULIN LISPRO 5 UNITS: 100 INJECTION, SOLUTION INTRAVENOUS; SUBCUTANEOUS at 16:37

## 2022-10-16 RX ADMIN — INSULIN LISPRO 7 UNITS: 100 INJECTION, SOLUTION INTRAVENOUS; SUBCUTANEOUS at 07:53

## 2022-10-16 RX ADMIN — TRAMADOL HYDROCHLORIDE 50 MG: 50 TABLET, COATED ORAL at 21:52

## 2022-10-16 RX ADMIN — Medication 10 ML: at 21:51

## 2022-10-16 RX ADMIN — INSULIN LISPRO 3 UNITS: 100 INJECTION, SOLUTION INTRAVENOUS; SUBCUTANEOUS at 17:10

## 2022-10-16 RX ADMIN — NICOTINE 1 PATCH: 14 PATCH, EXTENDED RELEASE TRANSDERMAL at 12:05

## 2022-10-16 RX ADMIN — PREGABALIN 25 MG: 25 CAPSULE ORAL at 12:05

## 2022-10-16 RX ADMIN — SODIUM CHLORIDE 1000 ML: 9 INJECTION, SOLUTION INTRAVENOUS at 05:45
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
## 2022-10-17 LAB
ALBUMIN SERPL-MCNC: 2.6 G/DL (ref 3.5–5.2)
ANION GAP SERPL CALCULATED.3IONS-SCNC: 11 MMOL/L (ref 5–15)
BACTERIA SPEC AEROBE CULT: NO GROWTH
BASOPHILS # BLD AUTO: 0.08 10*3/MM3 (ref 0–0.2)
BASOPHILS NFR BLD AUTO: 0.6 % (ref 0–1.5)
BUN SERPL-MCNC: 40 MG/DL (ref 8–23)
BUN/CREAT SERPL: 27 (ref 7–25)
CALCIUM SPEC-SCNC: 8.4 MG/DL (ref 8.6–10.5)
CHLORIDE SERPL-SCNC: 101 MMOL/L (ref 98–107)
CO2 SERPL-SCNC: 20 MMOL/L (ref 22–29)
CREAT SERPL-MCNC: 1.48 MG/DL (ref 0.76–1.27)
DEPRECATED RDW RBC AUTO: 38.4 FL (ref 37–54)
EGFRCR SERPLBLD CKD-EPI 2021: 50.6 ML/MIN/1.73
EOSINOPHIL # BLD AUTO: 0.37 10*3/MM3 (ref 0–0.4)
EOSINOPHIL NFR BLD AUTO: 2.8 % (ref 0.3–6.2)
ERYTHROCYTE [DISTWIDTH] IN BLOOD BY AUTOMATED COUNT: 12.1 % (ref 12.3–15.4)
GLUCOSE BLDC GLUCOMTR-MCNC: 186 MG/DL (ref 70–130)
GLUCOSE BLDC GLUCOMTR-MCNC: 232 MG/DL (ref 70–130)
GLUCOSE BLDC GLUCOMTR-MCNC: 237 MG/DL (ref 70–130)
GLUCOSE BLDC GLUCOMTR-MCNC: 245 MG/DL (ref 70–130)
GLUCOSE SERPL-MCNC: 297 MG/DL (ref 65–99)
HCT VFR BLD AUTO: 38.5 % (ref 37.5–51)
HGB BLD-MCNC: 13.8 G/DL (ref 13–17.7)
IMM GRANULOCYTES # BLD AUTO: 0.23 10*3/MM3 (ref 0–0.05)
IMM GRANULOCYTES NFR BLD AUTO: 1.8 % (ref 0–0.5)
LYMPHOCYTES # BLD AUTO: 1.5 10*3/MM3 (ref 0.7–3.1)
LYMPHOCYTES NFR BLD AUTO: 11.5 % (ref 19.6–45.3)
MAGNESIUM SERPL-MCNC: 1.9 MG/DL (ref 1.6–2.4)
MCH RBC QN AUTO: 31.1 PG (ref 26.6–33)
MCHC RBC AUTO-ENTMCNC: 35.8 G/DL (ref 31.5–35.7)
MCV RBC AUTO: 86.7 FL (ref 79–97)
MONOCYTES # BLD AUTO: 1.61 10*3/MM3 (ref 0.1–0.9)
MONOCYTES NFR BLD AUTO: 12.4 % (ref 5–12)
NEUTROPHILS NFR BLD AUTO: 70.9 % (ref 42.7–76)
NEUTROPHILS NFR BLD AUTO: 9.22 10*3/MM3 (ref 1.7–7)
NRBC BLD AUTO-RTO: 0 /100 WBC (ref 0–0.2)
PHOSPHATE SERPL-MCNC: 2.6 MG/DL (ref 2.5–4.5)
PLATELET # BLD AUTO: 179 10*3/MM3 (ref 140–450)
PMV BLD AUTO: 9.9 FL (ref 6–12)
POTASSIUM SERPL-SCNC: 4.1 MMOL/L (ref 3.5–5.2)
RBC # BLD AUTO: 4.44 10*6/MM3 (ref 4.14–5.8)
SODIUM SERPL-SCNC: 132 MMOL/L (ref 136–145)
WBC NRBC COR # BLD: 13.01 10*3/MM3 (ref 3.4–10.8)

## 2022-10-17 PROCEDURE — 63710000001 INSULIN LISPRO (HUMAN) PER 5 UNITS: Performed by: INTERNAL MEDICINE

## 2022-10-17 PROCEDURE — 99233 SBSQ HOSP IP/OBS HIGH 50: CPT | Performed by: INTERNAL MEDICINE

## 2022-10-17 PROCEDURE — 63710000001 INSULIN DETEMIR PER 5 UNITS: Performed by: INTERNAL MEDICINE

## 2022-10-17 PROCEDURE — 83735 ASSAY OF MAGNESIUM: CPT | Performed by: INTERNAL MEDICINE

## 2022-10-17 PROCEDURE — 82962 GLUCOSE BLOOD TEST: CPT

## 2022-10-17 PROCEDURE — 80069 RENAL FUNCTION PANEL: CPT | Performed by: INTERNAL MEDICINE

## 2022-10-17 PROCEDURE — 25010000002 CEFTRIAXONE PER 250 MG: Performed by: INTERNAL MEDICINE

## 2022-10-17 PROCEDURE — 85025 COMPLETE CBC W/AUTO DIFF WBC: CPT | Performed by: INTERNAL MEDICINE

## 2022-10-17 PROCEDURE — 99222 1ST HOSP IP/OBS MODERATE 55: CPT | Performed by: UROLOGY

## 2022-10-17 PROCEDURE — 25010000002 HEPARIN (PORCINE) PER 1000 UNITS: Performed by: INTERNAL MEDICINE

## 2022-10-17 RX ORDER — FINASTERIDE 5 MG/1
5 TABLET, FILM COATED ORAL DAILY
Status: DISCONTINUED | OUTPATIENT
Start: 2022-10-17 | End: 2022-10-20 | Stop reason: HOSPADM

## 2022-10-17 RX ORDER — BUMETANIDE 0.25 MG/ML
2 INJECTION INTRAMUSCULAR; INTRAVENOUS ONCE
Status: COMPLETED | OUTPATIENT
Start: 2022-10-17 | End: 2022-10-17

## 2022-10-17 RX ADMIN — NICOTINE 1 PATCH: 14 PATCH, EXTENDED RELEASE TRANSDERMAL at 12:44

## 2022-10-17 RX ADMIN — NYSTATIN: 100000 POWDER TOPICAL at 10:47

## 2022-10-17 RX ADMIN — FINASTERIDE 5 MG: 5 TABLET, FILM COATED ORAL at 11:00

## 2022-10-17 RX ADMIN — INSULIN LISPRO 3 UNITS: 100 INJECTION, SOLUTION INTRAVENOUS; SUBCUTANEOUS at 12:53

## 2022-10-17 RX ADMIN — TAMSULOSIN HYDROCHLORIDE 0.4 MG: 0.4 CAPSULE ORAL at 10:47

## 2022-10-17 RX ADMIN — SODIUM CHLORIDE 2 G: 900 INJECTION INTRAVENOUS at 06:41

## 2022-10-17 RX ADMIN — HEPARIN SODIUM 5000 UNITS: 5000 INJECTION INTRAVENOUS; SUBCUTANEOUS at 06:41

## 2022-10-17 RX ADMIN — INSULIN LISPRO 3 UNITS: 100 INJECTION, SOLUTION INTRAVENOUS; SUBCUTANEOUS at 10:50

## 2022-10-17 RX ADMIN — INSULIN DETEMIR 5 UNITS: 100 INJECTION, SOLUTION SUBCUTANEOUS at 10:44

## 2022-10-17 RX ADMIN — HEPARIN SODIUM 5000 UNITS: 5000 INJECTION INTRAVENOUS; SUBCUTANEOUS at 21:32

## 2022-10-17 RX ADMIN — PREGABALIN 25 MG: 25 CAPSULE ORAL at 10:47

## 2022-10-17 RX ADMIN — TRAMADOL HYDROCHLORIDE 50 MG: 50 TABLET, COATED ORAL at 13:25

## 2022-10-17 RX ADMIN — ACETAMINOPHEN 650 MG: 325 TABLET, FILM COATED ORAL at 21:32

## 2022-10-17 RX ADMIN — INSULIN LISPRO 2 UNITS: 100 INJECTION, SOLUTION INTRAVENOUS; SUBCUTANEOUS at 17:30

## 2022-10-17 RX ADMIN — INSULIN LISPRO 3 UNITS: 100 INJECTION, SOLUTION INTRAVENOUS; SUBCUTANEOUS at 17:30

## 2022-10-17 RX ADMIN — HEPARIN SODIUM 5000 UNITS: 5000 INJECTION INTRAVENOUS; SUBCUTANEOUS at 13:12

## 2022-10-17 RX ADMIN — BUMETANIDE 2 MG: 0.25 INJECTION, SOLUTION INTRAMUSCULAR; INTRAVENOUS at 13:05

## 2022-10-17 RX ADMIN — INSULIN DETEMIR 5 UNITS: 100 INJECTION, SOLUTION SUBCUTANEOUS at 21:33

## 2022-10-17 RX ADMIN — NYSTATIN: 100000 POWDER TOPICAL at 21:33

## 2022-10-17 RX ADMIN — INSULIN LISPRO 3 UNITS: 100 INJECTION, SOLUTION INTRAVENOUS; SUBCUTANEOUS at 12:52

## 2022-10-17 RX ADMIN — INSULIN LISPRO 3 UNITS: 100 INJECTION, SOLUTION INTRAVENOUS; SUBCUTANEOUS at 10:48
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
## 2022-10-17 NOTE — PLAN OF CARE
Goal Outcome Evaluation:   VSS. Back pain treated PRN as ordered, see BAKARI Mayes in place, output slightly pink. SR/ST. RA. A/ox4. No further complaints at this time.
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
## 2022-10-17 NOTE — CONSULTS
Nutrition Services    Patient Name:  Cj Cifuentes  YOB: 1950  MRN: [id number]  Admit Date:  10/16/2022    Consult received for education for new DM. Pt given material to review, receptive to full explanation of consistent carb diet. Will see 10/18 to complete instruction/offer out-pt svc. Note ÁNGEL resolving and pt w K+ and Phos WNL,Will adjust diet order to cardiac consistent carbohydrate.     Electronically signed by:  Shell Barlow RD  10/17/22 19:20 EDT
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
## 2022-10-17 NOTE — PLAN OF CARE
Goal Outcome Evaluation  Plan for home with spouse.  May go home with carbone and antibiotics.  BS improving.  Bumex 2 mg IV given x 1 today with good output; urine clearing pink tint. Edema improving .

## 2022-10-17 NOTE — PAYOR COMM NOTE
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 Microscopy    Sodium, Urine, Random - Urine, Clean Catch    Collection Time: 10/16/22  4:34 AM    Specimen: Urine, Clean Catch   Result Value Ref Range    Sodium, Urine 21 mmol/L   Osmolality, Urine - Urine, Clean Catch    Collection Time: 10/16/22  4:34 AM    Specimen: Urine, Clean Catch   Result Value Ref Range    Osmolality, Urine 316 300 - 1,100 mOsm/kg   Blood Gas, Venous With Co-Ox    Collection Time: 10/16/22  6:03 AM    Specimen: Venous Blood   Result Value Ref Range    Site Nurse/Dr Draw     pH, Venous 7.327 7.310 - 7.410 pH Units    pCO2, Venous 44.0 41.0 - 51.0 mm Hg    pO2, Venous 30.5 27.0 - 53.0 mm Hg    HCO3, Venous 23.0 22.0 - 28.0 mmol/L    Base Excess, Venous -3.1 (L) -2.0 - 2.0 mmol/L    Hemoglobin, Blood Gas 14.3 13.5 - 17.5 g/dL    Oxyhemoglobin Venous 50.8 %    Methemoglobin Venous 0.5 %    Carboxyhemoglobin Venous 1.2 %    CO2 Content 24.4 22 - 33 mmol/L    Temperature 37.0 C    Barometric Pressure for Blood Gas      Modality Room Air     FIO2 21 %    Rate 0 Breaths/minute    PIP 0 cmH2O    IPAP 0     EPAP 0     Note      POC Glucose Once    Collection Time: 10/16/22  8:59 AM    Specimen: Blood   Result Value Ref Range    Glucose 415 (C) 70 - 130 mg/dL   POC Glucose Once    Collection Time: 10/16/22 10:11 AM    Specimen: Blood   Result Value Ref Range    Glucose 357 (H) 70 - 130 mg/dL   Renal Function Panel    Collection Time: 10/16/22  3:08 PM    Specimen: Blood   Result Value Ref Range    Glucose 339 (H) 65 - 99 mg/dL    BUN 76 (H) 8 - 23 mg/dL    Creatinine 3.64 (H) 0.76 - 1.27 mg/dL    Sodium 133 (L) 136 - 145 mmol/L    Potassium 4.6 3.5 - 5.2 mmol/L    Chloride 99 98 - 107 mmol/L    CO2 21.0 (L) 22.0 - 29.0 mmol/L    Calcium 8.8 8.6 - 10.5 mg/dL    Albumin 3.00 (L) 3.50 - 5.20 g/dL    Phosphorus 4.4 2.5 - 4.5 mg/dL    Anion Gap 13.0 5.0 - 15.0 mmol/L    BUN/Creatinine Ratio 20.9 7.0 - 25.0    eGFR 17.2 (L) >60.0 mL/min/1.73   POC Glucose Once    Collection Time: 10/16/22  4:14 PM    Specimen: Blood   Result Value Ref Range    Glucose 337 (H) 70 - 130 mg/dL       If labs were ordered, I independently reviewed the results.        RADIOLOGY  CT Abdomen Pelvis Without Contrast    Result Date: 10/16/2022  CT OF THE ABDOMEN AND PELVIS WITHOUT CONTRAST Clinical indication: Acute urinary retention, abdominal distention and acute renal failure Comparison: None. Procedure: Noncontrast CT images of the abdomen and pelvis were obtained.  CT dose lowering techniques were used, to include: automated exposure control, adjustment for patient size, and or use of iterative reconstruction. Findings: Lung Bases: Small ill-defined groundglass opacity in the posterior right lower lobe. No effusion or pneumothorax. No significant pericardial fluid. Liver and biliary system: The liver is normal in size and configuration without focal lesion. No intra or extrahepatic biliary ductal dilatation is noted.  The gallbladder is normal without stones, wall thickening or adjacent fluid. Pancreas: The pancreas is unremarkable. Spleen: The spleen is normal.  Adrenals: The adrenal glands are within normal limits. Kidneys: Mild hydronephrosis bilaterally. Large left renal cysts measuring up to 8.3 cm. No obstructing stone is identified. Nonobstructive right renal calculus measuring 3 mm on coronal images. Gastrointestinal: The stomach and scattered loops of small and large bowel have a nonobstructive pattern. No focal mucosal lesion or inflammatory changes are seen.  The appendix is normal in the right lower quadrant. Mesentery and retroperitoneum: No mesenteric or retroperitoneal adenopathy.  No abnormal fluid collection, mass or free air.  Tortuosity of the visualized aorta and iliac vessels, though otherwise normal in course and caliber. Scattered atherosclerotic vascular calcifications. Pelvis: The bladder is collapsed around a Mayes catheter. Rectum is normal. No free fluid. No deep pelvic or inguinal adenopathy. Iliac vessels are grossly normal. Reproductive: The prostate is enlarged. Body wall: Fat-containing umbilical hernia. Bones: No acute osseous abnormality.     Impression: 1.  Mild bilateral urinary tract dilatation without obvious obstructing mass or stone. Nonobstructive right renal calculus. Mayes catheter in place. Consider possible infectious or inflammatory process. 2.  Prostatomegaly. 3.  Fat-containing umbilical hernia. 4.  Bladder is decompressed around a Mayes catheter. Electronically signed by:  Geovanna Lima D.O.  10/16/2022 4:13 AM Mountain Time      I ordered and reviewed the above noted radiographic studies.      I viewed images of urinary tract dilation bilaterally worse on the left than right with significant hydronephrosis.  Right-sided kidney stone, fat-containing umbilical hernia, and the largest prostate I have ever seen on imaging read by the radiologist as prostatomegaly.    See radiologist's dictation for official interpretation.        PROCEDURES    Procedures    No orders to display       MEDICATIONS GIVEN IN ER    Medications   Sodium  Chloride (PF) 0.9 % 10 mL (has no administration in time range)   nystatin (MYCOSTATIN) powder ( Topical Given 10/16/22 0580)   pregabalin (LYRICA) capsule 25 mg (25 mg Oral Given 10/16/22 1205)   tamsulosin (FLOMAX) 24 hr capsule 0.4 mg (0.4 mg Oral Given 10/16/22 1204)   sodium chloride 0.9 % flush 10 mL (10 mL Intravenous Given 10/16/22 1429)   sodium chloride 0.9 % flush 10 mL (has no administration in time range)   heparin (porcine) 5000 UNIT/ML injection 5,000 Units (5,000 Units Subcutaneous Given 10/16/22 1424)   acetaminophen (TYLENOL) tablet 650 mg (650 mg Oral Given 10/16/22 1425)     Or   acetaminophen (TYLENOL) 160 MG/5ML solution 650 mg ( Oral Not Given:  See Alt 10/16/22 1425)     Or   acetaminophen (TYLENOL) suppository 650 mg ( Rectal Not Given:  See Alt 10/16/22 1425)   nicotine (NICODERM CQ) 14 MG/24HR patch 1 patch ( Transdermal Canceled Entry 10/16/22 1613)   dextrose (GLUTOSE) oral gel 15 g (has no administration in time range)   dextrose (D50W) (25 g/50 mL) IV injection 25 g (has no administration in time range)   glucagon (human recombinant) (GLUCAGEN DIAGNOSTIC) injection 1 mg (has no administration in time range)   Insulin Lispro (humaLOG) injection 0-7 Units (5 Units Subcutaneous Given 10/16/22 1637)   traMADol (ULTRAM) tablet 50 mg (50 mg Oral Given 10/16/22 0758)   cefTRIAXone (ROCEPHIN) 2 g/100 mL 0.9% NS IVPB (MBP) (has no administration in time range)   insulin detemir (LEVEMIR) injection 5 Units (5 Units Subcutaneous Given 10/16/22 1105)   Insulin Lispro (humaLOG) injection 3 Units (3 Units Subcutaneous Given 10/16/22 1710)   sodium chloride 0.9 % bolus 1,000 mL (0 mL Intravenous Stopped 10/16/22 0715)   cefTRIAXone (ROCEPHIN) 1 g/100 mL 0.9% NS (MBP) (0 g Intravenous Stopped 10/16/22 0618)   cefTRIAXone (ROCEPHIN) 1 g/100 mL 0.9% NS (MBP) (0 g Intravenous Stopped 10/16/22 0824)         PROGRESS, DATA ANALYSIS, CONSULTS, AND MEDICAL DECISION MAKING    All labs have been independently  reviewed by me.  All radiology studies have been reviewed by me and the radiologist dictating the report.   EKG's have been independently viewed and interpreted by me.      He arrived uncomfortable, ill-appearing, with abdominal and pelvic distention and in obvious pain.  Initial bladder scan was just undetectably high red is more than 999 mL.  Immediate Mayes catheter was placed with immediate improvement in his pain, distention his umbilical hernia self reduced almost immediately after placement of the catheter.  Immediate return of 3 L of bloody urine.  He has significant leukocytosis with neutrophilic predominance started on ceftriaxone.  CMP shows profound hyperglycemia at 484 starting on IV fluids.  He has significant hyponatremia and hypochloremia and appears to be in acute renal failure with a creatinine of 6.06 and no history of renal disease.  Lipase is also significantly elevated at 723 he does not have epigastric pain but at this point is clearly coming into the hospital anyway we will keep IV fluids and nausea medications going.  Very mild hypothyroidism potentially stress response.  Urinalysis demonstrates gross hematuria a sending urine culture.  CT scan of the abdomen pelvis shows impressive enlarged prostate and urinary tract dilation presumably from the post bladder obstruction that has now been resolved.  Discussed with Dr. Cheung and the medicine team who agrees he needs hospital admission likely urology and nephrology evaluation and further fluid resuscitation.  Admitted in stable condition.         40 minutes of critical care provided. This time excludes other billable procedures. Time does include preparation of documents, medical consultations, review of old records, and direct bedside care. Patient is at high risk for life-threatening deterioration due to acute renal failure from obstructive uropathy from prostatomegaly with urinary retention.       AS OF 19:45 EDT VITALS:    BP - 144/79  HR  - 106  TEMP - 98.5 °F (36.9 °C) (Oral)  O2 SATS - 92%                  DIAGNOSIS  Final diagnoses:   Acute renal failure, unspecified acute renal failure type (HCC)   Obstructive uropathy   Acute prostatitis   Acute UTI (urinary tract infection)   Benign prostatic hyperplasia with urinary retention   Obesity (BMI 30-39.9)         DISPOSITION  Admit             Florencio Stephens MD  10/1950      Electronically signed by Florencio Stephens MD at 10/1950       Vital Signs (last 2 days)     Date/Time Temp Temp src Pulse Resp BP Patient Position SpO2    10/17/22 0900 -- -- 93 -- -- -- 92    10/17/22 0700 98.1 (36.7) Oral 81 18 135/90 Lying 94    10/17/22 0310 98.7 (37.1) Oral 88 16 128/59 Lying 91    10/16/22 2308 98 (36.7) Oral 95 18 133/89 Lying 93    10/16/22 2021 98.3 (36.8) Oral 111 18 146/86 Lying 92    10/16/22 1700 -- -- 106 -- -- -- 92    10/16/22 1500 98.5 (36.9) Oral 101 18 144/79 Lying 89    10/16/22 1300 -- -- 89 -- -- -- 93    10/16/22 1100 -- -- 83 -- -- -- 92    10/16/22 1023 98.3 (36.8) Oral 85 18 160/93 Lying 92    10/16/22 1002 98 (36.7) Oral 89 16 170/99 Lying 93    10/16/22 0900 -- -- -- -- 166/97 -- --    10/16/22 06:25:54 -- -- 92 16 167/97 Sitting 94    10/16/22 0401 98.2 (36.8) Oral 95 16 168/99 Sitting 94        Oxygen Therapy (last 2 days)     Date/Time SpO2 Device (Oxygen Therapy) Flow (L/min) Oxygen Concentration (%) ETCO2 (mmHg)    10/17/22 0900 92 -- -- -- --    10/17/22 0700 94 -- -- -- --    10/17/22 0655 -- nasal cannula 2 -- --    10/17/22 0455 -- nasal cannula 2 -- --    10/17/22 0310 91 nasal cannula -- -- --    10/17/22 0256 -- nasal cannula 2 -- --    10/17/22 0130 -- nasal cannula 2 -- --    10/17/22 0016 -- room air -- -- --    10/16/22 2308 93 room air -- -- --    10/16/22 2203 -- room air -- -- --    10/16/22 2021 92 room air -- -- --    10/16/22 2000 -- room air -- -- --    10/16/22 1700 92 -- -- -- --    10/16/22 1600 -- room air -- -- --    10/16/22 1500  89 -- -- -- --    10/16/22 1400 -- room air -- -- --    10/16/22 1300 93 -- -- -- --    10/16/22 1200 -- room air -- -- --    10/16/22 1100 92 -- -- -- --    10/16/22 1023 92 room air -- -- --    10/16/22 1002 93 -- -- -- --    10/16/22 06:25:54 94 room air -- -- --    10/16/22 0401 94 -- -- -- --          Current Facility-Administered Medications   Medication Dose Route Frequency Provider Last Rate Last Admin   • acetaminophen (TYLENOL) tablet 650 mg  650 mg Oral Q4H PRN Cris Cheung, DO   650 mg at 10/16/22 1425    Or   • acetaminophen (TYLENOL) 160 MG/5ML solution 650 mg  650 mg Oral Q4H PRN Cris Cheung G, DO        Or   • acetaminophen (TYLENOL) suppository 650 mg  650 mg Rectal Q4H PRN Cris Cheung G, DO       • cefTRIAXone (ROCEPHIN) 2 g/100 mL 0.9% NS IVPB (MBP)  2 g Intravenous Q24H Giovani Morales DO   2 g at 10/17/22 0641   • dextrose (D50W) (25 g/50 mL) IV injection 25 g  25 g Intravenous Q15 Min PRN Cris Cheung G, DO       • dextrose (GLUTOSE) oral gel 15 g  15 g Oral Q15 Min PRN Cris Cheung, DO       • finasteride (PROSCAR) tablet 5 mg  5 mg Oral Daily Pardeep Fry MD       • glucagon (human recombinant) (GLUCAGEN DIAGNOSTIC) injection 1 mg  1 mg Intramuscular Q15 Min PRN Cris Cheung, DO       • heparin (porcine) 5000 UNIT/ML injection 5,000 Units  5,000 Units Subcutaneous Q8H Cris Cheung, DO   5,000 Units at 10/17/22 0641   • insulin detemir (LEVEMIR) injection 5 Units  5 Units Subcutaneous Q12H Giovani Morales DO   5 Units at 10/16/22 2152   • Insulin Lispro (humaLOG) injection 0-7 Units  0-7 Units Subcutaneous TID AC Cris Cheung, DO   5 Units at 10/16/22 1637   • Insulin Lispro (humaLOG) injection 3 Units  3 Units Subcutaneous TID With Meals Giovani Morales, DO   3 Units at 10/16/22 1710   • nicotine (NICODERM CQ) 14 MG/24HR patch 1 patch  1 patch Transdermal Q24H Cris Cheung DO   1 patch at 10/16/22 1205   • nystatin (MYCOSTATIN) powder    Topical Q12H Skye, Cris G, DO   Given at 10/16/22 2151   • pregabalin (LYRICA) capsule 25 mg  25 mg Oral Daily Skye, Cris G, DO   25 mg at 10/16/22 1205   • Sodium Chloride (PF) 0.9 % 10 mL  10 mL Intravenous PRN Skye, Cris G, DO       • sodium chloride 0.9 % flush 10 mL  10 mL Intravenous Q12H Skye, Cris G, DO   10 mL at 10/16/22 2151   • sodium chloride 0.9 % flush 10 mL  10 mL Intravenous PRN Skye, Cris G, DO       • tamsulosin (FLOMAX) 24 hr capsule 0.4 mg  0.4 mg Oral Daily Skye, Cris G, DO   0.4 mg at 10/16/22 1204   • traMADol (ULTRAM) tablet 50 mg  50 mg Oral Q12H PRN Skye, Cris G, DO   50 mg at 10/16/22 2152     Lab Results (last 48 hours)     Procedure Component Value Units Date/Time    Urine Culture - Urine, Urine, Clean Catch [[id number]]  (Normal) Collected: 10/16/22 0434    Specimen: Urine, Clean Catch Updated: 10/17/22 0921     Urine Culture No growth    POC Glucose Once [[id number]]  (Abnormal) Collected: 10/17/22 0704    Specimen: Blood Updated: 10/17/22 0705     Glucose 232 mg/dL      Comment: Meter: [id number] : [id number] Leon Galeas       Blood Culture - Blood, Arm, Right [[id number]]  (Normal) Collected: 10/16/22 0541    Specimen: Blood from Arm, Right Updated: 10/17/22 0701     Blood Culture No growth at 24 hours    Blood Culture - Blood, Arm, Right [[id number]]  (Normal) Collected: 10/16/22 0541    Specimen: Blood from Arm, Right Updated: 10/17/22 0701     Blood Culture No growth at 24 hours    Magnesium [[id number]]  (Normal) Collected: 10/17/22 0420    Specimen: Blood Updated: 10/17/22 0539     Magnesium 1.9 mg/dL     Renal Function Panel [[id number]]  (Abnormal) Collected: 10/17/22 0420    Specimen: Blood Updated: 10/17/22 0539     Glucose 297 mg/dL      BUN 40 mg/dL      Creatinine 1.48 mg/dL      Sodium 132 mmol/L      Potassium 4.1 mmol/L      Chloride 101 mmol/L      CO2 20.0 mmol/L      Calcium 8.4 mg/dL      Albumin 2.60 g/dL      Phosphorus 2.6 mg/dL      Anion  Gap 11.0 mmol/L      BUN/Creatinine Ratio 27.0     eGFR 50.6 mL/min/1.73      Comment: National Kidney Foundation and American Society of Nephrology (ASN) Task Force recommended calculation based on the Chronic Kidney Disease Epidemiology Collaboration (CKD-EPI) equation refit without adjustment for race.       Narrative:      GFR Normal >60  Chronic Kidney Disease <60  Kidney Failure <15      CBC & Differential [[id number]]  (Abnormal) Collected: 10/17/22 0420    Specimen: Blood Updated: 10/17/22 0512    Narrative:      The following orders were created for panel order CBC & Differential.  Procedure                               Abnormality         Status                     ---------                               -----------         ------                     CBC Auto Differential[[id number]]        Abnormal            Final result                 Please view results for these tests on the individual orders.    CBC Auto Differential [[id number]]  (Abnormal) Collected: 10/17/22 0420    Specimen: Blood Updated: 10/17/22 0512     WBC 13.01 10*3/mm3      RBC 4.44 10*6/mm3      Hemoglobin 13.8 g/dL      Hematocrit 38.5 %      MCV 86.7 fL      MCH 31.1 pg      MCHC 35.8 g/dL      RDW 12.1 %      RDW-SD 38.4 fl      MPV 9.9 fL      Platelets 179 10*3/mm3      Neutrophil % 70.9 %      Lymphocyte % 11.5 %      Monocyte % 12.4 %      Eosinophil % 2.8 %      Basophil % 0.6 %      Immature Grans % 1.8 %      Neutrophils, Absolute 9.22 10*3/mm3      Lymphocytes, Absolute 1.50 10*3/mm3      Monocytes, Absolute 1.61 10*3/mm3      Eosinophils, Absolute 0.37 10*3/mm3      Basophils, Absolute 0.08 10*3/mm3      Immature Grans, Absolute 0.23 10*3/mm3      nRBC 0.0 /100 WBC     Urea Nitrogen, Urine - Urine, Clean Catch [[id number]] Collected: 10/16/22 0434    Specimen: Urine, Clean Catch Updated: 10/16/22 2118     Urea Nitrogen, Urine 442 mg/dL     Narrative:      Reference intervals for random urine have not been established.  Clinical  usage is dependent upon physician's interpretation in combination with other laboratory tests.       Creatinine, Urine, Random - Urine, Clean Catch [[id number]] Collected: 10/16/22 0434    Specimen: Urine, Clean Catch Updated: 10/16/22 2118     Creatinine, Urine 39.9 mg/dL     Narrative:      Reference intervals for random urine have not been established.  Clinical usage is dependent upon physician's interpretation in combination with other laboratory tests.       POC Glucose Once [[id number]]  (Abnormal) Collected: 10/16/22 2052    Specimen: Blood Updated: 10/16/22 2055     Glucose 364 mg/dL      Comment: Meter: [id number] : [id number] Karlos Jacob       POC Glucose Once [[id number]]  (Abnormal) Collected: 10/16/22 1614    Specimen: Blood Updated: 10/16/22 1616     Glucose 337 mg/dL      Comment: Meter: [id number] : [id number] Leon Galeas       Renal Function Panel [[id number]]  (Abnormal) Collected: 10/16/22 1508    Specimen: Blood Updated: 10/16/22 1602     Glucose 339 mg/dL      BUN 76 mg/dL      Creatinine 3.64 mg/dL      Sodium 133 mmol/L      Potassium 4.6 mmol/L      Chloride 99 mmol/L      CO2 21.0 mmol/L      Calcium 8.8 mg/dL      Albumin 3.00 g/dL      Phosphorus 4.4 mg/dL      Anion Gap 13.0 mmol/L      BUN/Creatinine Ratio 20.9     eGFR 17.2 mL/min/1.73      Comment: National Kidney Foundation and American Society of Nephrology (ASN) Task Force recommended calculation based on the Chronic Kidney Disease Epidemiology Collaboration (CKD-EPI) equation refit without adjustment for race.       Narrative:      GFR Normal >60  Chronic Kidney Disease <60  Kidney Failure <15      Osmolality, Urine - Urine, Clean Catch [[id number]]  (Normal) Collected: 10/16/22 0434    Specimen: Urine, Clean Catch Updated: 10/16/22 1319     Osmolality, Urine 316 mOsm/kg     Osmolality, Serum [[id number]]  (Abnormal) Collected: 10/16/22 0420    Specimen: Blood Updated: 10/16/22 1319     Osmolality 318 mOsm/kg     Hemoglobin A1c  [[id number]]  (Abnormal) Collected: 10/16/22 0420    Specimen: Blood Updated: 10/16/22 1159     Hemoglobin A1C 10.60 %     Narrative:      Hemoglobin A1C Ranges:    Increased Risk for Diabetes  5.7% to 6.4%  Diabetes                     >= 6.5%  Diabetic Goal                < 7.0%    Sodium, Urine, Random - Urine, Clean Catch [[id number]] Collected: 10/16/22 0434    Specimen: Urine, Clean Catch Updated: 10/16/22 1157     Sodium, Urine 21 mmol/L     Narrative:      Reference intervals for random urine have not been established.  Clinical usage is dependent upon physician's interpretation in combination with other laboratory tests.       POC Glucose Once [[id number]]  (Abnormal) Collected: 10/16/22 1011    Specimen: Blood Updated: 10/16/22 1012     Glucose 357 mg/dL      Comment: Meter: [id number] : [id number] Leon Galeas       POC Glucose Once [[id number]]  (Abnormal) Collected: 10/16/22 0859    Specimen: Blood Updated: 10/16/22 0911     Glucose 415 mg/dL      Comment: Result Not Confirmed Meter: [id number] : [id number] Lizzy Cherise       [place] Draw [[id number]] Collected: 10/16/22 0420    Specimen: Blood Updated: 10/16/22 0830    Narrative:      The following orders were created for panel order [place] Draw.  Procedure                               Abnormality         Status                     ---------                               -----------         ------                     Green Top (Gel)[[id number]]                                  Final result               Lavender Top[[id number]]                                     Final result               Gold Top - SST[[id number]]                                   Final result               Broussard Top[[id number]]                                         Final result               Light Blue Top[[id number]]                                   Final result                 Please view results for these tests on the individual orders.    Gray Top [[id number]] Collected:  "10/16/22 0420    Specimen: Blood Updated: 10/16/22 0830     Extra Tube Hold for add-ons.     Comment: Auto resulted.       Procalcitonin [[id number]]  (Normal) Collected: 10/16/22 0420    Specimen: Blood Updated: 10/16/22 0619     Procalcitonin 0.21 ng/mL     Narrative:      As a Marker for Sepsis (Non-Neonates):    1. <0.5 ng/mL represents a low risk of severe sepsis and/or septic shock.  2. >2 ng/mL represents a high risk of severe sepsis and/or septic shock.    As a Marker for Lower Respiratory Tract Infections that require antibiotic therapy:    PCT on Admission    Antibiotic Therapy       6-12 Hrs later    >0.5                Strongly Recommended  >0.25 - <0.5        Recommended   0.1 - 0.25          Discouraged              Remeasure/reassess PCT  <0.1                Strongly Discouraged     Remeasure/reassess PCT    As 28 day mortality risk marker: \"Change in Procalcitonin Result\" (>80% or <=80%) if Day 0 (or Day 1) and Day 4 values are available. Refer to http://www.[email or web address]pct[facility]calculator.[email or web address]    Change in PCT <=80%  A decrease of PCT levels below or equal to 80% defines a positive change in PCT test result representing a higher risk for 28-day all-cause mortality of patients diagnosed with severe sepsis for septic shock.    Change in PCT >80%  A decrease of PCT levels of more than 80% defines a negative change in PCT result representing a lower risk for 28-day all-cause mortality of patients diagnosed with severe sepsis or septic shock.       Troponin [[id number]]  (Normal) Collected: 10/16/22 0420    Specimen: Blood Updated: 10/16/22 0617     Troponin T 0.020 ng/mL     Narrative:      Troponin T Reference Range:  <= 0.03 ng/mL-   Negative for AMI  >0.03 ng/mL-     Abnormal for myocardial necrosis.  Clinicians would have to utilize clinical acumen, EKG, Troponin and serial changes to determine if it is an Acute Myocardial Infarction or myocardial injury due to an underlying chronic condition.       Results may " be falsely decreased if patient taking Biotin.      T4, Free [[id number]]  (Abnormal) Collected: 10/16/22 0420    Specimen: Blood Updated: 10/16/22 0617     Free T4 0.92 ng/dL     Narrative:      Results may be falsely increased if patient taking Biotin.      TSH [[id number]]  (Abnormal) Collected: 10/16/22 0420    Specimen: Blood Updated: 10/16/22 0617     TSH 6.390 uIU/mL     Narrative:      Due to abnormal TSH results, suggest ordering Free T4.    C-reactive Protein [[id number]]  (Abnormal) Collected: 10/16/22 0420    Specimen: Blood Updated: 10/16/22 0613     C-Reactive Protein 19.32 mg/dL     Magnesium [[id number]]  (Abnormal) Collected: 10/16/22 0420    Specimen: Blood Updated: 10/16/22 0613     Magnesium 2.6 mg/dL     Phosphorus [[id number]]  (Abnormal) Collected: 10/16/22 0420    Specimen: Blood Updated: 10/16/22 0613     Phosphorus 5.4 mg/dL     Blood Gas, Venous With Co-Ox [[id number]]  (Abnormal) Collected: 10/16/22 0603    Specimen: Venous Blood Updated: 10/16/22 0603     Site Nurse/Dr Draw     pH, Venous 7.327 pH Units      pCO2, Venous 44.0 mm Hg      pO2, Venous 30.5 mm Hg      HCO3, Venous 23.0 mmol/L      Base Excess, Venous -3.1 mmol/L      Hemoglobin, Blood Gas 14.3 g/dL      Oxyhemoglobin Venous 50.8 %      Methemoglobin Venous 0.5 %      Carboxyhemoglobin Venous 1.2 %      CO2 Content 24.4 mmol/L      Temperature 37.0 C      Barometric Pressure for Blood Gas --     Comment: N/A        Modality Room Air     FIO2 21 %      Rate 0 Breaths/minute      PIP 0 cmH2O      Comment: Meter: [id number]     :  [id number]        IPAP 0     EPAP 0     Note --    Green Top (Gel)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

## 2022-10-18 LAB
ANION GAP SERPL CALCULATED.3IONS-SCNC: 10 MMOL/L (ref 5–15)
BASOPHILS # BLD AUTO: 0.08 10*3/MM3 (ref 0–0.2)
BASOPHILS NFR BLD AUTO: 0.6 % (ref 0–1.5)
BUN SERPL-MCNC: 21 MG/DL (ref 8–23)
BUN/CREAT SERPL: 25.9 (ref 7–25)
CALCIUM SPEC-SCNC: 8.6 MG/DL (ref 8.6–10.5)
CHLORIDE SERPL-SCNC: 99 MMOL/L (ref 98–107)
CO2 SERPL-SCNC: 24 MMOL/L (ref 22–29)
CREAT SERPL-MCNC: 0.81 MG/DL (ref 0.76–1.27)
DEPRECATED RDW RBC AUTO: 38.5 FL (ref 37–54)
EGFRCR SERPLBLD CKD-EPI 2021: 94.8 ML/MIN/1.73
EOSINOPHIL # BLD AUTO: 0.42 10*3/MM3 (ref 0–0.4)
EOSINOPHIL NFR BLD AUTO: 3.3 % (ref 0.3–6.2)
ERYTHROCYTE [DISTWIDTH] IN BLOOD BY AUTOMATED COUNT: 12 % (ref 12.3–15.4)
GLUCOSE BLDC GLUCOMTR-MCNC: 191 MG/DL (ref 70–130)
GLUCOSE BLDC GLUCOMTR-MCNC: 252 MG/DL (ref 70–130)
GLUCOSE BLDC GLUCOMTR-MCNC: 254 MG/DL (ref 70–130)
GLUCOSE BLDC GLUCOMTR-MCNC: 283 MG/DL (ref 70–130)
GLUCOSE SERPL-MCNC: 215 MG/DL (ref 65–99)
HCT VFR BLD AUTO: 40.7 % (ref 37.5–51)
HGB BLD-MCNC: 14.1 G/DL (ref 13–17.7)
IMM GRANULOCYTES # BLD AUTO: 0.23 10*3/MM3 (ref 0–0.05)
IMM GRANULOCYTES NFR BLD AUTO: 1.8 % (ref 0–0.5)
LYMPHOCYTES # BLD AUTO: 1.65 10*3/MM3 (ref 0.7–3.1)
LYMPHOCYTES NFR BLD AUTO: 12.8 % (ref 19.6–45.3)
MAGNESIUM SERPL-MCNC: 1.6 MG/DL (ref 1.6–2.4)
MCH RBC QN AUTO: 30.2 PG (ref 26.6–33)
MCHC RBC AUTO-ENTMCNC: 34.6 G/DL (ref 31.5–35.7)
MCV RBC AUTO: 87.2 FL (ref 79–97)
MONOCYTES # BLD AUTO: 1.37 10*3/MM3 (ref 0.1–0.9)
MONOCYTES NFR BLD AUTO: 10.7 % (ref 5–12)
NEUTROPHILS NFR BLD AUTO: 70.8 % (ref 42.7–76)
NEUTROPHILS NFR BLD AUTO: 9.1 10*3/MM3 (ref 1.7–7)
NRBC BLD AUTO-RTO: 0 /100 WBC (ref 0–0.2)
PHOSPHATE SERPL-MCNC: 2.7 MG/DL (ref 2.5–4.5)
PLATELET # BLD AUTO: 200 10*3/MM3 (ref 140–450)
PMV BLD AUTO: 10 FL (ref 6–12)
POTASSIUM SERPL-SCNC: 3.4 MMOL/L (ref 3.5–5.2)
RBC # BLD AUTO: 4.67 10*6/MM3 (ref 4.14–5.8)
SODIUM SERPL-SCNC: 133 MMOL/L (ref 136–145)
WBC NRBC COR # BLD: 12.85 10*3/MM3 (ref 3.4–10.8)

## 2022-10-18 PROCEDURE — G0108 DIAB MANAGE TRN  PER INDIV: HCPCS | Performed by: REGISTERED NURSE

## 2022-10-18 PROCEDURE — 63710000001 INSULIN LISPRO (HUMAN) PER 5 UNITS: Performed by: INTERNAL MEDICINE

## 2022-10-18 PROCEDURE — 85025 COMPLETE CBC W/AUTO DIFF WBC: CPT | Performed by: INTERNAL MEDICINE

## 2022-10-18 PROCEDURE — 83735 ASSAY OF MAGNESIUM: CPT | Performed by: INTERNAL MEDICINE

## 2022-10-18 PROCEDURE — 80048 BASIC METABOLIC PNL TOTAL CA: CPT | Performed by: INTERNAL MEDICINE

## 2022-10-18 PROCEDURE — 84100 ASSAY OF PHOSPHORUS: CPT | Performed by: INTERNAL MEDICINE

## 2022-10-18 PROCEDURE — 82962 GLUCOSE BLOOD TEST: CPT

## 2022-10-18 PROCEDURE — 63710000001 INSULIN DETEMIR PER 5 UNITS: Performed by: INTERNAL MEDICINE

## 2022-10-18 PROCEDURE — 99232 SBSQ HOSP IP/OBS MODERATE 35: CPT | Performed by: INTERNAL MEDICINE

## 2022-10-18 PROCEDURE — 0 MAGNESIUM SULFATE 4 GM/100ML SOLUTION: Performed by: INTERNAL MEDICINE

## 2022-10-18 PROCEDURE — 25010000002 CEFTRIAXONE PER 250 MG: Performed by: INTERNAL MEDICINE

## 2022-10-18 PROCEDURE — 25010000002 HEPARIN (PORCINE) PER 1000 UNITS: Performed by: INTERNAL MEDICINE

## 2022-10-18 RX ORDER — MAGNESIUM SULFATE HEPTAHYDRATE 40 MG/ML
4 INJECTION, SOLUTION INTRAVENOUS ONCE
Status: COMPLETED | OUTPATIENT
Start: 2022-10-18 | End: 2022-10-18

## 2022-10-18 RX ORDER — POTASSIUM CHLORIDE 750 MG/1
40 CAPSULE, EXTENDED RELEASE ORAL EVERY 4 HOURS
Status: COMPLETED | OUTPATIENT
Start: 2022-10-18 | End: 2022-10-18

## 2022-10-18 RX ADMIN — PREGABALIN 25 MG: 25 CAPSULE ORAL at 09:58

## 2022-10-18 RX ADMIN — INSULIN DETEMIR 5 UNITS: 100 INJECTION, SOLUTION SUBCUTANEOUS at 09:58

## 2022-10-18 RX ADMIN — POTASSIUM CHLORIDE 40 MEQ: 750 CAPSULE, EXTENDED RELEASE ORAL at 09:57

## 2022-10-18 RX ADMIN — MAGNESIUM SULFATE HEPTAHYDRATE 4 G: 40 INJECTION, SOLUTION INTRAVENOUS at 13:18

## 2022-10-18 RX ADMIN — INSULIN DETEMIR 10 UNITS: 100 INJECTION, SOLUTION SUBCUTANEOUS at 20:30

## 2022-10-18 RX ADMIN — TAMSULOSIN HYDROCHLORIDE 0.4 MG: 0.4 CAPSULE ORAL at 09:58

## 2022-10-18 RX ADMIN — Medication 10 ML: at 09:58

## 2022-10-18 RX ADMIN — SODIUM CHLORIDE 2 G: 900 INJECTION INTRAVENOUS at 05:52

## 2022-10-18 RX ADMIN — INSULIN LISPRO 4 UNITS: 100 INJECTION, SOLUTION INTRAVENOUS; SUBCUTANEOUS at 09:59

## 2022-10-18 RX ADMIN — NYSTATIN: 100000 POWDER TOPICAL at 20:30

## 2022-10-18 RX ADMIN — TRAMADOL HYDROCHLORIDE 50 MG: 50 TABLET, COATED ORAL at 20:30

## 2022-10-18 RX ADMIN — HEPARIN SODIUM 5000 UNITS: 5000 INJECTION INTRAVENOUS; SUBCUTANEOUS at 05:52

## 2022-10-18 RX ADMIN — NYSTATIN: 100000 POWDER TOPICAL at 09:57

## 2022-10-18 RX ADMIN — HEPARIN SODIUM 5000 UNITS: 5000 INJECTION INTRAVENOUS; SUBCUTANEOUS at 15:54

## 2022-10-18 RX ADMIN — NICOTINE 1 PATCH: 14 PATCH, EXTENDED RELEASE TRANSDERMAL at 12:09

## 2022-10-18 RX ADMIN — INSULIN LISPRO 3 UNITS: 100 INJECTION, SOLUTION INTRAVENOUS; SUBCUTANEOUS at 09:58

## 2022-10-18 RX ADMIN — INSULIN LISPRO 3 UNITS: 100 INJECTION, SOLUTION INTRAVENOUS; SUBCUTANEOUS at 12:08

## 2022-10-18 RX ADMIN — HEPARIN SODIUM 5000 UNITS: 5000 INJECTION INTRAVENOUS; SUBCUTANEOUS at 20:30

## 2022-10-18 RX ADMIN — POTASSIUM CHLORIDE 40 MEQ: 750 CAPSULE, EXTENDED RELEASE ORAL at 12:09

## 2022-10-18 RX ADMIN — FINASTERIDE 5 MG: 5 TABLET, FILM COATED ORAL at 09:58

## 2022-10-18 RX ADMIN — INSULIN LISPRO 4 UNITS: 100 INJECTION, SOLUTION INTRAVENOUS; SUBCUTANEOUS at 12:09

## 2022-10-18 RX ADMIN — INSULIN LISPRO 3 UNITS: 100 INJECTION, SOLUTION INTRAVENOUS; SUBCUTANEOUS at 18:19

## 2022-10-18 RX ADMIN — INSULIN LISPRO 2 UNITS: 100 INJECTION, SOLUTION INTRAVENOUS; SUBCUTANEOUS at 18:19

## 2022-10-18 NOTE — PROGRESS NOTES
Clinical Nutrition     Nutrition Education   Reason for Visit:   APRN/PA Consult      Patient Name: Cj Cifuentes  YOB: 1950  MRN: [id number]  Date of Encounter: 10/18/22 19:21 EDT  Admission date: 10/16/2022        Applicable Diagnoses     New DM, HTN    Diet/Nutrition Related History:     Pt allows dx and need for diet are new. Wanting to know how to proceed. Family allow already searching for good choices.      Labs reviewed   Yes  Hgb A1c 10.6 Low serum Na    Nutrition Diagnosis   10/18  Problem Food and nutrition knowledge deficit   Related To Diet to control BS   Signs/Symptoms reported   Status: Pt/family have material w diet guidelines Rpt understanding Family already making plans for adherence.  Goal:     Increase knowledge on diet/nutrition effects on condition/status     Nutrition Intervention     Education provided regarding nutrition therapy for: Diet rationale, Key food habit change, Consistent Carbohydrate Diet and Sodium Restriction    Education provided regarding food habits/behavior related to:Food choices, Eating pattern, Appropriate portions and Label reading     RD provided printed material via [employer] RDN created education material    Some basic instruction to pt. Then w pt needing attn from RN, instruction provided to family who will be obtaining/preparing food.    Monitoring/Evaluation:     Family acknowledged understanding of material covered      Shell Barlow RD  Time Spent: 25 min
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
## 2022-10-18 NOTE — PLAN OF CARE
Goal Outcome Evaluation:   VSS. Back pain treated PRN as ordered, see BAKARI Mayes in place, output slightly pink. SR. RA.  No further complaints at this time

## 2022-10-18 NOTE — CONSULTS
Mr. Cifuentes gave permission for diabetes education. His wife was also in attendance. He is newly diagnosed with T2DM with an A1c of 10.6%. Discussed what is an A1c and the significance of his result and that his provider may want to discharge him home on insulin to help control his diabetes. He verbalized he is fearful of needles and preferred not to be on insulin.   Discussed ADA goals for BG and A1c levels. Instructed ADA recommends routinely checking BG. He was agreeable to be taught how to use a glucose meter.  Donated [employer] Guide Me glucose meter provided. IInstructed to check expiration date and safe storage of glucose test strips, how to prepare lancing device, obtain a sample, and perform test, safe disposal of lancets.To log results to take with him to his follow up with his PCP. Instructed he would need to obtain a prescription for meter supplies.   Demonstrated how to use the meter and Mr. Cifuentes did return demonstration without any problem. He states he feels he will be able to use the meter independently at home. Instructed to check BG BID 2-3 days per week, fasting and 2 hours post prandial.   Discussed and  about type 2 diabetes self-management, risk factors, and importance of blood glucose control to reduce complications.Signs, symptoms and treatment of hyperglycemia and hypoglycemia were discussed. Lifestyle changes such as physical activity with MD approval and healthy eating were encouraged. Offered a free op follow up appointment however declined at this time. Provided with OP diabetes education contact information if he would want to attend a class after discharge. Handouts on What is Diabetes?, A1c, BG goals, plate method provided.   Dicussed with patient and his RN that if his provider wants to discharge him on insulin and he agrees that a diabetes educator would revisit and instruct on insulin administration. Please re consult if needed. Thank you.
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
## 2022-10-18 NOTE — PLAN OF CARE
Goal Outcome Evaluation:      Patient is NSR on the monitor and on room air.  Alert and oriented times four.  Patient is up with standby assist.  Patient had a dwelling carbone, removed per MD to perform voiding trial.  Patient's urine output prior to d/c of carbone, pink/red, doctor aware. Patient was bladder scanned four hours post d/c of carbone, patient had 559 ml in bladder.  Dr. Fry called and notified, carbone catheter placed per MD orders.  I&D consulted.  Bed in lowest position and phone and call light in reach.

## 2022-10-19 LAB
ANION GAP SERPL CALCULATED.3IONS-SCNC: 8 MMOL/L (ref 5–15)
BASOPHILS # BLD AUTO: 0.07 10*3/MM3 (ref 0–0.2)
BASOPHILS NFR BLD AUTO: 0.6 % (ref 0–1.5)
BUN SERPL-MCNC: 14 MG/DL (ref 8–23)
BUN/CREAT SERPL: 15.1 (ref 7–25)
CALCIUM SPEC-SCNC: 8.5 MG/DL (ref 8.6–10.5)
CHLORIDE SERPL-SCNC: 103 MMOL/L (ref 98–107)
CO2 SERPL-SCNC: 25 MMOL/L (ref 22–29)
CREAT SERPL-MCNC: 0.93 MG/DL (ref 0.76–1.27)
DEPRECATED RDW RBC AUTO: 38.5 FL (ref 37–54)
EGFRCR SERPLBLD CKD-EPI 2021: 88.3 ML/MIN/1.73
EOSINOPHIL # BLD AUTO: 0.43 10*3/MM3 (ref 0–0.4)
EOSINOPHIL NFR BLD AUTO: 3.6 % (ref 0.3–6.2)
ERYTHROCYTE [DISTWIDTH] IN BLOOD BY AUTOMATED COUNT: 12.1 % (ref 12.3–15.4)
GLUCOSE BLDC GLUCOMTR-MCNC: 204 MG/DL (ref 70–130)
GLUCOSE BLDC GLUCOMTR-MCNC: 211 MG/DL (ref 70–130)
GLUCOSE BLDC GLUCOMTR-MCNC: 226 MG/DL (ref 70–130)
GLUCOSE BLDC GLUCOMTR-MCNC: 228 MG/DL (ref 70–130)
GLUCOSE BLDC GLUCOMTR-MCNC: 252 MG/DL (ref 70–130)
GLUCOSE SERPL-MCNC: 211 MG/DL (ref 65–99)
HCT VFR BLD AUTO: 40.5 % (ref 37.5–51)
HGB BLD-MCNC: 14.3 G/DL (ref 13–17.7)
IMM GRANULOCYTES # BLD AUTO: 0.28 10*3/MM3 (ref 0–0.05)
IMM GRANULOCYTES NFR BLD AUTO: 2.3 % (ref 0–0.5)
LYMPHOCYTES # BLD AUTO: 1.83 10*3/MM3 (ref 0.7–3.1)
LYMPHOCYTES NFR BLD AUTO: 15.3 % (ref 19.6–45.3)
MAGNESIUM SERPL-MCNC: 2.5 MG/DL (ref 1.6–2.4)
MCH RBC QN AUTO: 30.6 PG (ref 26.6–33)
MCHC RBC AUTO-ENTMCNC: 35.3 G/DL (ref 31.5–35.7)
MCV RBC AUTO: 86.7 FL (ref 79–97)
MONOCYTES # BLD AUTO: 1.08 10*3/MM3 (ref 0.1–0.9)
MONOCYTES NFR BLD AUTO: 9 % (ref 5–12)
NEUTROPHILS NFR BLD AUTO: 69.2 % (ref 42.7–76)
NEUTROPHILS NFR BLD AUTO: 8.25 10*3/MM3 (ref 1.7–7)
NRBC BLD AUTO-RTO: 0 /100 WBC (ref 0–0.2)
PHOSPHATE SERPL-MCNC: 3.1 MG/DL (ref 2.5–4.5)
PLATELET # BLD AUTO: 216 10*3/MM3 (ref 140–450)
PMV BLD AUTO: 9.5 FL (ref 6–12)
POTASSIUM SERPL-SCNC: 4.4 MMOL/L (ref 3.5–5.2)
RBC # BLD AUTO: 4.67 10*6/MM3 (ref 4.14–5.8)
SODIUM SERPL-SCNC: 136 MMOL/L (ref 136–145)
WBC NRBC COR # BLD: 11.94 10*3/MM3 (ref 3.4–10.8)

## 2022-10-19 PROCEDURE — 25010000002 CEFTRIAXONE PER 250 MG: Performed by: INTERNAL MEDICINE

## 2022-10-19 PROCEDURE — 99232 SBSQ HOSP IP/OBS MODERATE 35: CPT | Performed by: INTERNAL MEDICINE

## 2022-10-19 PROCEDURE — 85025 COMPLETE CBC W/AUTO DIFF WBC: CPT | Performed by: INTERNAL MEDICINE

## 2022-10-19 PROCEDURE — 80048 BASIC METABOLIC PNL TOTAL CA: CPT | Performed by: INTERNAL MEDICINE

## 2022-10-19 PROCEDURE — 25010000002 HEPARIN (PORCINE) PER 1000 UNITS: Performed by: INTERNAL MEDICINE

## 2022-10-19 PROCEDURE — 83735 ASSAY OF MAGNESIUM: CPT | Performed by: INTERNAL MEDICINE

## 2022-10-19 PROCEDURE — 84100 ASSAY OF PHOSPHORUS: CPT | Performed by: INTERNAL MEDICINE

## 2022-10-19 PROCEDURE — 82962 GLUCOSE BLOOD TEST: CPT

## 2022-10-19 PROCEDURE — 63710000001 INSULIN DETEMIR PER 5 UNITS: Performed by: INTERNAL MEDICINE

## 2022-10-19 PROCEDURE — 63710000001 INSULIN LISPRO (HUMAN) PER 5 UNITS: Performed by: INTERNAL MEDICINE

## 2022-10-19 RX ADMIN — Medication 10 ML: at 20:34

## 2022-10-19 RX ADMIN — SODIUM CHLORIDE 2 G: 900 INJECTION INTRAVENOUS at 06:00

## 2022-10-19 RX ADMIN — TRAMADOL HYDROCHLORIDE 50 MG: 50 TABLET, COATED ORAL at 20:34

## 2022-10-19 RX ADMIN — INSULIN LISPRO 6 UNITS: 100 INJECTION, SOLUTION INTRAVENOUS; SUBCUTANEOUS at 09:47

## 2022-10-19 RX ADMIN — INSULIN LISPRO 3 UNITS: 100 INJECTION, SOLUTION INTRAVENOUS; SUBCUTANEOUS at 17:52

## 2022-10-19 RX ADMIN — INSULIN DETEMIR 10 UNITS: 100 INJECTION, SOLUTION SUBCUTANEOUS at 20:34

## 2022-10-19 RX ADMIN — NYSTATIN: 100000 POWDER TOPICAL at 20:34

## 2022-10-19 RX ADMIN — INSULIN LISPRO 4 UNITS: 100 INJECTION, SOLUTION INTRAVENOUS; SUBCUTANEOUS at 17:51

## 2022-10-19 RX ADMIN — HEPARIN SODIUM 5000 UNITS: 5000 INJECTION INTRAVENOUS; SUBCUTANEOUS at 15:56

## 2022-10-19 RX ADMIN — INSULIN LISPRO 3 UNITS: 100 INJECTION, SOLUTION INTRAVENOUS; SUBCUTANEOUS at 09:46

## 2022-10-19 RX ADMIN — FINASTERIDE 5 MG: 5 TABLET, FILM COATED ORAL at 09:46

## 2022-10-19 RX ADMIN — NICOTINE 1 PATCH: 14 PATCH, EXTENDED RELEASE TRANSDERMAL at 12:34

## 2022-10-19 RX ADMIN — PREGABALIN 25 MG: 25 CAPSULE ORAL at 09:45

## 2022-10-19 RX ADMIN — HEPARIN SODIUM 5000 UNITS: 5000 INJECTION INTRAVENOUS; SUBCUTANEOUS at 20:34

## 2022-10-19 RX ADMIN — INSULIN LISPRO 3 UNITS: 100 INJECTION, SOLUTION INTRAVENOUS; SUBCUTANEOUS at 12:33

## 2022-10-19 RX ADMIN — Medication 10 ML: at 09:46

## 2022-10-19 RX ADMIN — INSULIN DETEMIR 10 UNITS: 100 INJECTION, SOLUTION SUBCUTANEOUS at 09:50

## 2022-10-19 RX ADMIN — TAMSULOSIN HYDROCHLORIDE 0.4 MG: 0.4 CAPSULE ORAL at 09:45

## 2022-10-19 RX ADMIN — HEPARIN SODIUM 5000 UNITS: 5000 INJECTION INTRAVENOUS; SUBCUTANEOUS at 06:00

## 2022-10-19 RX ADMIN — INSULIN LISPRO 3 UNITS: 100 INJECTION, SOLUTION INTRAVENOUS; SUBCUTANEOUS at 12:34

## 2022-10-19 RX ADMIN — NYSTATIN: 100000 POWDER TOPICAL at 09:46

## 2022-10-19 NOTE — CASE MANAGEMENT/SOCIAL WORK
Continued Stay Note  [facility]     Patient Name: Cj Cifuentes  MRN: [id number]  Today's Date: 10/19/2022    Admit Date: 10/16/2022    Plan: discharge plan   Discharge Plan     Row Name 10/19/22 1442       Plan    Plan discharge plan    Plan Comments I met with pt and pt's spouse at bedside regarding discharge plan.  Plan is home with spouse. Pt denies needing HH or rehab, but is requesting a quad cane and states he has no preference to the [employer] company. Referral made to [facility] and spoke with Derik. [facility] will deliver a quad cane to patient's room prior to discharge. CM will cont to follow.    Final Discharge Disposition Code 01 - home or self-care               Discharge Codes    No documentation.               Expected Discharge Date and Time     Expected Discharge Date Expected Discharge Time    Oct 20, 2022             Negrita Khalil RN

## 2022-10-19 NOTE — PLAN OF CARE
VSS, pt resting well with good UOP and no current complaints. Will continue to monitor.  Problem: Adult Inpatient Plan of Care  Goal: Plan of Care Review  Outcome: Ongoing, Progressing  Goal: Patient-Specific Goal (Individualized)  Outcome: Ongoing, Progressing  Goal: Absence of Hospital-Acquired Illness or Injury  Outcome: Ongoing, Progressing  Intervention: Identify and Manage Fall Risk  Recent Flowsheet Documentation  Taken 10/19/2022 0400 by Neal Wong RN  Safety Promotion/Fall Prevention: activity supervised  Taken 10/19/2022 0200 by Neal Wong RN  Safety Promotion/Fall Prevention: activity supervised  Taken 10/19/2022 0000 by Neal Wong RN  Safety Promotion/Fall Prevention: activity supervised  Taken 10/18/2022 2200 by Neal Wong RN  Safety Promotion/Fall Prevention: activity supervised  Taken 10/18/2022 2000 by Neal Wong RN  Safety Promotion/Fall Prevention:   assistive device/personal items within reach   activity supervised   clutter free environment maintained   fall prevention program maintained   nonskid shoes/slippers when out of bed  Intervention: Prevent Skin Injury  Recent Flowsheet Documentation  Taken 10/19/2022 0400 by Neal Wong RN  Body Position: position changed independently  Taken 10/19/2022 0200 by Neal Wong RN  Body Position: position changed independently  Taken 10/19/2022 0000 by Neal Wong RN  Body Position: position changed independently  Taken 10/18/2022 2200 by Neal Wong RN  Body Position: position changed independently  Taken 10/18/2022 2000 by Neal Wong RN  Body Position: position changed independently  Intervention: Prevent and Manage VTE (Venous Thromboembolism) Risk  Recent Flowsheet Documentation  Taken 10/19/2022 0400 by Neal Wong RN  Activity Management: activity adjusted per tolerance  Taken 10/19/2022 0200 by Neal Wong RN  Activity Management: activity adjusted per tolerance  Taken 10/19/2022 0000 by Neal Wong RN  Activity Management:  activity adjusted per tolerance  Taken 10/18/2022 2200 by Neal Wong RN  Activity Management: activity adjusted per tolerance  Taken 10/18/2022 2000 by Neal Wong RN  Activity Management: activity adjusted per tolerance  VTE Prevention/Management: sequential compression devices off  Intervention: Prevent Infection  Recent Flowsheet Documentation  Taken 10/19/2022 0400 by Neal Wong RN  Infection Prevention: environmental surveillance performed  Taken 10/19/2022 0200 by Neal Wong RN  Infection Prevention: environmental surveillance performed  Taken 10/19/2022 0000 by Neal Wong RN  Infection Prevention: environmental surveillance performed  Taken 10/18/2022 2200 by Neal Wong RN  Infection Prevention: environmental surveillance performed  Taken 10/18/2022 2000 by Neal Wogn RN  Infection Prevention: environmental surveillance performed  Goal: Optimal Comfort and Wellbeing  Outcome: Ongoing, Progressing  Intervention: Monitor Pain and Promote Comfort  Recent Flowsheet Documentation  Taken 10/18/2022 2030 by Neal Wong RN  Pain Management Interventions: see MAR  Goal: Readiness for Transition of Care  Outcome: Ongoing, Progressing     Problem: Fall Injury Risk  Goal: Absence of Fall and Fall-Related Injury  Outcome: Ongoing, Progressing  Intervention: Identify and Manage Contributors  Recent Flowsheet Documentation  Taken 10/19/2022 0400 by Neal Wong RN  Medication Review/Management: medications reviewed  Taken 10/19/2022 0200 by Neal Wong RN  Medication Review/Management: medications reviewed  Taken 10/19/2022 0000 by Neal Wong RN  Medication Review/Management: medications reviewed  Taken 10/18/2022 2200 by Neal Wong RN  Medication Review/Management: medications reviewed  Taken 10/18/2022 2000 by Neal Wong RN  Medication Review/Management: medications reviewed  Intervention: Promote Injury-Free Environment  Recent Flowsheet Documentation  Taken 10/19/2022 0400 by Neal Wong RN  Safety  Promotion/Fall Prevention: activity supervised  Taken 10/19/2022 0200 by Neal Wong RN  Safety Promotion/Fall Prevention: activity supervised  Taken 10/19/2022 0000 by Neal Wong RN  Safety Promotion/Fall Prevention: activity supervised  Taken 10/18/2022 2200 by Neal Wong RN  Safety Promotion/Fall Prevention: activity supervised  Taken 10/18/2022 2000 by Neal Wong RN  Safety Promotion/Fall Prevention:   assistive device/personal items within reach   activity supervised   clutter free environment maintained   fall prevention program maintained   nonskid shoes/slippers when out of bed     Problem: Fluid and Electrolyte Imbalance (Acute Kidney Injury/Impairment)  Goal: Fluid and Electrolyte Balance  Outcome: Ongoing, Progressing     Problem: Oral Intake Inadequate (Acute Kidney Injury/Impairment)  Goal: Optimal Nutrition Intake  Outcome: Ongoing, Progressing     Problem: Renal Function Impairment (Acute Kidney Injury/Impairment)  Goal: Effective Renal Function  Outcome: Ongoing, Progressing  Intervention: Monitor and Support Renal Function  Recent Flowsheet Documentation  Taken 10/19/2022 0400 by Neal Wong RN  Medication Review/Management: medications reviewed  Taken 10/19/2022 0200 by Neal Wong RN  Medication Review/Management: medications reviewed  Taken 10/19/2022 0000 by Neal Wong RN  Medication Review/Management: medications reviewed  Taken 10/18/2022 2200 by Neal Wong RN  Medication Review/Management: medications reviewed  Taken 10/18/2022 2000 by Neal Wong RN  Medication Review/Management: medications reviewed     Problem: Electrolyte Imbalance  Goal: Electrolyte Balance  Outcome: Ongoing, Progressing   Goal Outcome Evaluation:
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
## 2022-10-19 NOTE — PLAN OF CARE
Goal Outcome Evaluation:         Patient is NSR on the monitor and on room air.  Patient is up with standby assist.  Alert and oriented timed four.  Carbone catheter in place.  Plan of care, possibly DC to home on 10/20 with carbone catheter in place.  Bed in lowest position and phone and call light in reach.

## 2022-10-20 ENCOUNTER — TELEPHONE (OUTPATIENT)
Dept: NEUROSURGERY | Facility: CLINIC | Age: 72
End: 2022-10-20

## 2022-10-20 ENCOUNTER — READMISSION MANAGEMENT (OUTPATIENT)
Dept: CALL CENTER | Facility: HOSPITAL | Age: 72
End: 2022-10-20

## 2022-10-20 VITALS
WEIGHT: 253.8 LBS | HEART RATE: 70 BPM | HEIGHT: 69 IN | OXYGEN SATURATION: 97 % | DIASTOLIC BLOOD PRESSURE: 86 MMHG | BODY MASS INDEX: 37.59 KG/M2 | RESPIRATION RATE: 16 BRPM | SYSTOLIC BLOOD PRESSURE: 138 MMHG | TEMPERATURE: 97.5 F

## 2022-10-20 LAB
GLUCOSE BLDC GLUCOMTR-MCNC: 191 MG/DL (ref 70–130)
GLUCOSE BLDC GLUCOMTR-MCNC: 214 MG/DL (ref 70–130)

## 2022-10-20 PROCEDURE — 25010000002 CEFTRIAXONE PER 250 MG: Performed by: INTERNAL MEDICINE

## 2022-10-20 PROCEDURE — 63710000001 INSULIN LISPRO (HUMAN) PER 5 UNITS: Performed by: INTERNAL MEDICINE

## 2022-10-20 PROCEDURE — 63710000001 INSULIN DETEMIR PER 5 UNITS: Performed by: INTERNAL MEDICINE

## 2022-10-20 PROCEDURE — 99239 HOSP IP/OBS DSCHRG MGMT >30: CPT | Performed by: INTERNAL MEDICINE

## 2022-10-20 PROCEDURE — 82962 GLUCOSE BLOOD TEST: CPT

## 2022-10-20 PROCEDURE — 99232 SBSQ HOSP IP/OBS MODERATE 35: CPT | Performed by: UROLOGY

## 2022-10-20 PROCEDURE — 25010000002 HEPARIN (PORCINE) PER 1000 UNITS: Performed by: INTERNAL MEDICINE

## 2022-10-20 RX ORDER — TAMSULOSIN HYDROCHLORIDE 0.4 MG/1
1 CAPSULE ORAL DAILY
Qty: 30 CAPSULE | Refills: 11 | Status: SHIPPED | OUTPATIENT
Start: 2022-10-20 | End: 2023-03-13

## 2022-10-20 RX ORDER — GLIPIZIDE 5 MG/1
5 TABLET ORAL
Qty: 60 TABLET | Refills: 3 | Status: SHIPPED | OUTPATIENT
Start: 2022-10-20 | End: 2022-12-02

## 2022-10-20 RX ORDER — CEPHALEXIN 500 MG/1
500 CAPSULE ORAL 4 TIMES DAILY
Qty: 120 CAPSULE | Refills: 0 | Status: SHIPPED | OUTPATIENT
Start: 2022-10-20 | End: 2022-11-19

## 2022-10-20 RX ORDER — FINASTERIDE 5 MG/1
5 TABLET, FILM COATED ORAL DAILY
Qty: 30 TABLET | Refills: 11 | Status: SHIPPED | OUTPATIENT
Start: 2022-10-20 | End: 2022-11-19

## 2022-10-20 RX ORDER — ATORVASTATIN CALCIUM 20 MG/1
20 TABLET, FILM COATED ORAL NIGHTLY
Qty: 90 TABLET | Refills: 0 | Status: SHIPPED | OUTPATIENT
Start: 2022-10-20 | End: 2023-03-13 | Stop reason: SDUPTHER

## 2022-10-20 RX ORDER — LEVOTHYROXINE SODIUM 0.05 MG/1
50 TABLET ORAL DAILY
Qty: 30 TABLET | Refills: 3 | Status: SHIPPED | OUTPATIENT
Start: 2022-10-20 | End: 2023-03-13

## 2022-10-20 RX ORDER — BLOOD-GLUCOSE METER
1 KIT MISCELLANEOUS DAILY
Qty: 1 EACH | Refills: 0 | Status: SHIPPED | OUTPATIENT
Start: 2022-10-20 | End: 2023-03-13

## 2022-10-20 RX ORDER — LANCETS 28 GAUGE
EACH MISCELLANEOUS
Qty: 30 EACH | Refills: 12 | Status: SHIPPED | OUTPATIENT
Start: 2022-10-20 | End: 2023-03-13

## 2022-10-20 RX ADMIN — INSULIN LISPRO 2 UNITS: 100 INJECTION, SOLUTION INTRAVENOUS; SUBCUTANEOUS at 08:59

## 2022-10-20 RX ADMIN — FINASTERIDE 5 MG: 5 TABLET, FILM COATED ORAL at 08:59

## 2022-10-20 RX ADMIN — INSULIN LISPRO 3 UNITS: 100 INJECTION, SOLUTION INTRAVENOUS; SUBCUTANEOUS at 12:42

## 2022-10-20 RX ADMIN — HEPARIN SODIUM 5000 UNITS: 5000 INJECTION INTRAVENOUS; SUBCUTANEOUS at 06:36

## 2022-10-20 RX ADMIN — PREGABALIN 25 MG: 25 CAPSULE ORAL at 08:59

## 2022-10-20 RX ADMIN — INSULIN LISPRO 3 UNITS: 100 INJECTION, SOLUTION INTRAVENOUS; SUBCUTANEOUS at 09:00

## 2022-10-20 RX ADMIN — SODIUM CHLORIDE 2 G: 900 INJECTION INTRAVENOUS at 06:36

## 2022-10-20 RX ADMIN — Medication 10 ML: at 09:02

## 2022-10-20 RX ADMIN — INSULIN LISPRO 3 UNITS: 100 INJECTION, SOLUTION INTRAVENOUS; SUBCUTANEOUS at 12:44

## 2022-10-20 RX ADMIN — HEPARIN SODIUM 5000 UNITS: 5000 INJECTION INTRAVENOUS; SUBCUTANEOUS at 14:01

## 2022-10-20 RX ADMIN — INSULIN DETEMIR 10 UNITS: 100 INJECTION, SOLUTION SUBCUTANEOUS at 09:02

## 2022-10-20 RX ADMIN — NICOTINE 1 PATCH: 14 PATCH, EXTENDED RELEASE TRANSDERMAL at 12:43

## 2022-10-20 RX ADMIN — TAMSULOSIN HYDROCHLORIDE 0.4 MG: 0.4 CAPSULE ORAL at 08:59
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
## 2022-10-20 NOTE — CASE MANAGEMENT/SOCIAL WORK
Case Management Discharge Note      Final Note: Plan is home with spouse and spouse will transport. Pt/spouse comfortable with going home catheter/leg bag. Pt has a quad cane(per request) in room provided by [employer]. Pt denies further discharge needs.         Selected Continued Care - Admitted Since 10/16/2022     Destination    No services have been selected for the patient.              Durable Medical Equipment     Service Provider Selected Services Address Phone Fax Patient Preferred    [facility] [address] [address] [phone] --          Dialysis/Infusion    No services have been selected for the patient.              Home Medical Care    No services have been selected for the patient.              Therapy    No services have been selected for the patient.              Community Resources    No services have been selected for the patient.              Community & DME    No services have been selected for the patient.                       Final Discharge Disposition Code: 01 - home or self-care

## 2022-10-20 NOTE — PROGRESS NOTES
[facility]   Urology Progress Note    Patient Name: Cj Cifuentes  : 1950  MRN: [id number]  Date of admission: 10/16/2022  Surgical Procedures Since Admission:    Subjective   Subjective     Chief Complaint: Urinary retention/ prostatitis    History of Present Illness   Reports he his doing well.  Denies any catheter discomfort.  Clear urine draining.  He failed TOV and had to have his catheter replaced.      Objective   Objective     Vitals:   Temp:  [97.4 °F (36.3 °C)-98.4 °F (36.9 °C)] 97.5 °F (36.4 °C)  Heart Rate:  [70-96] 70  Resp:  [16-18] 16  BP: (133-147)/(79-87) 138/86  Output by Drain (mL) 10/19/22 0701 - 10/19/22 1900 10/19/22 1901 - 10/20/22 0700 10/20/22 0701 - 10/20/22 0958 Range Total   Urethral Catheter 1900 550  2450       Physical Exam  Vitals and nursing note reviewed.   Constitutional:       General: He is awake. He is not in acute distress.     Appearance: Normal appearance. He is well-developed.   HENT:      Head: Normocephalic and atraumatic.      Right Ear: External ear normal.      Left Ear: External ear normal.      Nose: Nose normal.   Eyes:      Conjunctiva/sclera: Conjunctivae normal.   Pulmonary:      Effort: Pulmonary effort is normal.   Abdominal:      General: There is no distension.      Palpations: Abdomen is soft. There is no mass.      Tenderness: There is no abdominal tenderness. There is no right CVA tenderness, left CVA tenderness, guarding or rebound.      Hernia: No hernia is present. There is no hernia in the left inguinal area or right inguinal area.   Genitourinary:     Pubic Area: No rash.       Penis: Normal.       Testes: Normal.      Rectum: No mass or tenderness. Normal anal tone.   Musculoskeletal:      Cervical back: Normal range of motion.   Lymphadenopathy:      Lower Body: No right inguinal adenopathy. No left inguinal adenopathy.   Skin:     General: Skin is warm.   Neurological:      General: No focal deficit present.      Mental Status: He is  alert and oriented to person, place, and time.   Psychiatric:         Behavior: Behavior normal. Behavior is cooperative.           Result Review    Result Review:  I have personally reviewed the results from the time of this admission to 10/20/2022 09:58 EDT and agree with these findings:  [x]  Laboratory  [x]  Microbiology  []  Radiology  []  EKG/Telemetry   []  Cardiology/Vascular   []  Pathology  []  Old records  []  Other:      Assessment & Plan   Assessment / Plan     Brief Patient Summary:  Cj Cifuentes is a 72 y.o. male who has prostatitis and urinary retention.  He failed his TOV    Active Hospital Problems:  Active Hospital Problems    Diagnosis    • **ARF (acute renal failure) (HCC)    • BPH (benign prostatic hyperplasia)    • Newly diagnosed diabetes (HCC)    • Acute urinary retention    • Prostatitis    • Obstructive uropathy    • Obesity (BMI 30-39.9)      Plan:   1.  Pt to be discharged with Keflex x 4 weeks  2.  May follow up in 1-2 weeks with Urology for trial of void

## 2022-10-21 ENCOUNTER — TRANSITIONAL CARE MANAGEMENT TELEPHONE ENCOUNTER (OUTPATIENT)
Dept: CALL CENTER | Facility: HOSPITAL | Age: 72
End: 2022-10-21

## 2022-10-21 LAB
BACTERIA SPEC AEROBE CULT: NORMAL
BACTERIA SPEC AEROBE CULT: NORMAL

## 2022-10-21 NOTE — OUTREACH NOTE
Call Center TCM Note    Flowsheet Row Responses   [facility] [place] patient discharged from? [place]   Does the patient have one of the following disease processes/diagnoses(primary or secondary)? Other   TCM attempt successful? No  [verbal- wife]   Unsuccessful attempts Attempt 1   Comments HOSP DC FU appt 10/24/22 @ 11:15 am   Does the patient have an appointment with their PCP within 7 days of discharge? Yes          Eliana Aranda RN    10/21/2022, 10:43 EDT

## 2022-10-21 NOTE — OUTREACH NOTE
Prep Survey    Flowsheet Row Responses   [facility] patient discharged from? [place]   Is LACE score < 7 ? No   Emergency Room discharge w/ pulse ox? No   Eligibility [facility]   Date of Admission 10/16/22   Date of Discharge 10/20/22   Discharge Disposition Home or Self Care   Discharge diagnosis acute renal failure   Does the patient have one of the following disease processes/diagnoses(primary or secondary)? Other   Does the patient have Home health ordered? No   Is there a DME ordered? Yes   What DME was ordered? quad cane(per request) in room provided by [employer]   Prep survey completed? Yes          MAURO PALUMBO - Registered Nurse

## 2022-10-21 NOTE — OUTREACH NOTE
Call Center TCM Note    Flowsheet Row Responses   [facility] patient discharged from? [place]   Does the patient have one of the following disease processes/diagnoses(primary or secondary)? Other   TCM attempt successful? No   Unsuccessful attempts Attempt 2          Eliana Aranda RN    10/21/2022, 12:54 EDT

## 2022-10-24 ENCOUNTER — OFFICE VISIT (OUTPATIENT)
Dept: FAMILY MEDICINE CLINIC | Facility: CLINIC | Age: 72
End: 2022-10-24

## 2022-10-24 ENCOUNTER — TRANSITIONAL CARE MANAGEMENT TELEPHONE ENCOUNTER (OUTPATIENT)
Dept: CALL CENTER | Facility: HOSPITAL | Age: 72
End: 2022-10-24

## 2022-10-24 VITALS
DIASTOLIC BLOOD PRESSURE: 80 MMHG | OXYGEN SATURATION: 98 % | WEIGHT: 245.8 LBS | BODY MASS INDEX: 36.4 KG/M2 | TEMPERATURE: 97.8 F | HEIGHT: 69 IN | SYSTOLIC BLOOD PRESSURE: 130 MMHG | RESPIRATION RATE: 20 BRPM | HEART RATE: 120 BPM

## 2022-10-24 DIAGNOSIS — Z09 HOSPITAL DISCHARGE FOLLOW-UP: ICD-10-CM

## 2022-10-24 DIAGNOSIS — N40.1 BENIGN PROSTATIC HYPERPLASIA WITH URINARY RETENTION: ICD-10-CM

## 2022-10-24 DIAGNOSIS — N13.9 OBSTRUCTIVE UROPATHY: ICD-10-CM

## 2022-10-24 DIAGNOSIS — E03.3 POSTINFECTIOUS HYPOTHYROIDISM: ICD-10-CM

## 2022-10-24 DIAGNOSIS — E11.65 TYPE 2 DIABETES MELLITUS WITH HYPERGLYCEMIA, WITHOUT LONG-TERM CURRENT USE OF INSULIN: Primary | ICD-10-CM

## 2022-10-24 DIAGNOSIS — R33.8 BENIGN PROSTATIC HYPERPLASIA WITH URINARY RETENTION: ICD-10-CM

## 2022-10-24 PROBLEM — N17.9 ARF (ACUTE RENAL FAILURE): Status: RESOLVED | Noted: 2022-10-16 | Resolved: 2022-10-24

## 2022-10-24 PROCEDURE — 99495 TRANSJ CARE MGMT MOD F2F 14D: CPT | Performed by: FAMILY MEDICINE

## 2022-10-24 PROCEDURE — 1111F DSCHRG MED/CURRENT MED MERGE: CPT | Performed by: FAMILY MEDICINE

## 2022-10-24 RX ORDER — PIOGLITAZONEHYDROCHLORIDE 15 MG/1
15 TABLET ORAL DAILY
Qty: 30 TABLET | Refills: 0 | Status: ON HOLD | OUTPATIENT
Start: 2022-10-24 | End: 2022-11-21

## 2022-10-24 NOTE — OUTREACH NOTE
Call Center TCM Note    Flowsheet Row Responses   [facility] patient discharged from? [place]   Does the patient have one of the following disease processes/diagnoses(primary or secondary)? Other   TCM attempt successful? No  [Kassidy-wife]   Unsuccessful attempts Attempt 3          Connie Steinberg RN    10/24/2022, 08:53 EDT

## 2022-10-24 NOTE — PROGRESS NOTES
Transitional Care Follow Up Visit  Subjective     Cj Cifuentes is a 72 y.o. male who presents for a transitional care management visit.    Within 48 business hours after discharge our office contacted him via telephone to coordinate his care and needs.      I reviewed and discussed the details of that call along with the discharge summary, hospital problems, inpatient lab results, inpatient diagnostic studies, and consultation reports with Cj.     Current outpatient and discharge medications have been reconciled for the patient.  Reviewed by: Gary Kaur MD      Date of TCM Phone Call 10/20/2022   [facility]   Date of Admission 10/16/2022   Date of Discharge 10/20/2022   Discharge Disposition Home or Self Care     Risk for Readmission (LACE) Score: 8 (10/20/2022  6:00 AM)      History of Present Illness  Course During Hospital Stay: Admitted to [facility] with acute kidney injury secondary to acute urinary retention from BPH and prostatitis.  Patient was admitted and placed on IV Rocephin and transition to cephalexin.  Infectious disease and urology were consulted.  Prior to discharge patient's creatinine and GFR have returned to baseline.    Patient was also found to have mild hypothyroidism and started on low-dose levothyroxine.    Patient was a newly diagnosed diabetes approximately a week before hospitalization.  Insulin was recommended at discharge but patient wants to use oral medications.  He has had a 20 pound weight loss in the last 2 weeks     The following portions of the patient's history were reviewed and updated as appropriate: allergies, current medications, past family history, past medical history, past social history, past surgical history, and problem list.    Objective   Physical Exam  Constitutional:       Appearance: He is ill-appearing. He is not toxic-appearing.   HENT:      Head: Normocephalic.   Neurological:      Mental Status: He is alert.      Comments: Patient is  using a cane to assist with ambulation         Assessment & Plan   Diagnoses and all orders for this visit:    1. Type 2 diabetes mellitus with hyperglycemia, without long-term current use of insulin (HCC) (Primary)  -     pioglitazone (Actos) 15 MG tablet; Take 1 tablet by mouth Daily.  Dispense: 30 tablet; Refill: 0  -     Continuous Blood Gluc Sensor (FreeStyle Kera 2 Sensor) misc; 1 each Every 14 (Fourteen) Days.  Dispense: 1 each; Refill: 0    2. Benign prostatic hyperplasia with urinary retention    3. Hospital discharge follow-up    4. Obstructive uropathy    5. Postinfectious hypothyroidism    1.  Add pioglitazone 15 mg to diabetes regimen of glipizide 5 mg twice daily and metformin 500 mg twice daily.  Patient given samples of freestyle kera 2 sensors which she will use in return with glucose readings in approximately 4 weeks  2.  For patient's obstructive uropathy due to BPH and prostatitis he has follow-up with urology this Thursday and will maintain on his Mayes catheter attached to leg bag  3.  Suspect hypothyroidism was due to severity of illness and plan for follow-up TSH and free T4 upon return visit in 4 weeks.  4.  Electrolyte imbalances have corrected and renal function has returned to normal         Return in about 4 weeks (around 11/21/2022) for Recheck Diabetes and Hypothyroidism.  Gary Kaur MD

## 2022-10-27 ENCOUNTER — OFFICE VISIT (OUTPATIENT)
Dept: UROLOGY | Facility: CLINIC | Age: 72
End: 2022-10-27

## 2022-10-27 ENCOUNTER — TELEPHONE (OUTPATIENT)
Dept: UROLOGY | Facility: CLINIC | Age: 72
End: 2022-10-27

## 2022-10-27 VITALS — WEIGHT: 245 LBS | BODY MASS INDEX: 36.29 KG/M2 | HEIGHT: 69 IN

## 2022-10-27 DIAGNOSIS — R33.9 URINARY RETENTION: Primary | ICD-10-CM

## 2022-10-27 PROCEDURE — 99214 OFFICE O/P EST MOD 30 MIN: CPT | Performed by: UROLOGY
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
## 2022-10-28 ENCOUNTER — TELEPHONE (OUTPATIENT)
Dept: UROLOGY | Facility: CLINIC | Age: 72
End: 2022-10-28

## 2022-10-28 NOTE — TELEPHONE ENCOUNTER
I called the patient's PCP's office Dr. Gary Kaur to see if patient has ever had his PSA tested and they told me that they had no record of it, but had me call the office of Dr. SERAFIN Ingram where Dr. Kaur use to be and check there.  I was able to reach them and they did not have any PSA lab results either.

## 2022-11-03 ENCOUNTER — PROCEDURE VISIT (OUTPATIENT)
Dept: UROLOGY | Facility: CLINIC | Age: 72
End: 2022-11-03

## 2022-11-03 VITALS — BODY MASS INDEX: 36.29 KG/M2 | HEIGHT: 69 IN | WEIGHT: 245 LBS

## 2022-11-03 DIAGNOSIS — N40.1 BENIGN PROSTATIC HYPERPLASIA WITH URINARY RETENTION: ICD-10-CM

## 2022-11-03 DIAGNOSIS — R33.8 ACUTE URINARY RETENTION: Primary | ICD-10-CM

## 2022-11-03 DIAGNOSIS — R33.8 BENIGN PROSTATIC HYPERPLASIA WITH URINARY RETENTION: ICD-10-CM

## 2022-11-03 PROCEDURE — 52000 CYSTOURETHROSCOPY: CPT | Performed by: UROLOGY

## 2022-11-03 RX ORDER — CEFTRIAXONE 1 G/1
1 INJECTION, POWDER, FOR SOLUTION INTRAMUSCULAR; INTRAVENOUS ONCE
Status: COMPLETED | OUTPATIENT
Start: 2022-11-03 | End: 2022-11-03

## 2022-11-03 RX ADMIN — CEFTRIAXONE 1 G: 1 INJECTION, POWDER, FOR SOLUTION INTRAMUSCULAR; INTRAVENOUS at 10:39

## 2022-11-03 NOTE — PROGRESS NOTES
Preprocedure diagnosis  Urinary retention    Postprocedure diagnosis  Same    Procedure  Flexible Cystourethroscopy    Attending surgeon  Tuyet Stinson MD    Anesthesia  2% lidocaine jelly intraurethrally    Complications  None    Indications  72 y.o. male undergoing a flexible cystoscopy for the above mentioned indications.  Informed consent was obtained.      Findings  Cystoscopic findings included one right and left ureteral orifice in the normal orthotopic position with normal bladder mucosa and no tumors, masses or stones.   The urethral urothelium was within normal limits with no visible strictures.  The prostatic urethra revealed complete lateral lobe coaptation, with huge median lobe.  Moderate trabeculations.    Procedure  The patient was placed in supine position and prepped and draped in sterile fashion with lidocaine jelly instill per the urethra for anesthesia 5 minutes prior to procedure start.  A brief timeout was performed including available nursing staff and the patient.  The 16 Fr digital flexible cystoscope was lubricated and gently advanced into the urethral meatus. The penile, bulbar, prostatic, and membranous urethra appeared widely patent without obvious stricture or mucosal abnormality. The scope was then advanced into the bladder. The bladder was completely visualized starting with the trigone. There were bilateral orthotopic ureteral orifices. The posterior wall, lateral walls, anterior wall, and dome were completely visualized WITHOUT obvious mucosal lesions, tumors, stones.  He had moderate trabeculations.  The cystoscope was retroflexed and the bladder neck and prostate were further visualized and appeared normal.  The cystoscope was then gently withdrawn while visualizing the urethra and the procedure was then terminated.  The patient tolerated the procedure well.      He was unable to void.      TRUS Volume obtained.    MARGARITA: no nodules     Total Volume: 125.48 cc  Height 54.20 mm   Width 67.86 mm Length 65.23 mm       I discussed his options with him today.  Unfortunately, he has never undergone prostate cancer screening.  We will obtain a PSA today.  I discussed that we will likely be elevated due to his retention and catheterization.  We will then need to likely perform a prostate biopsy.  The reason for this is if he is positive for prostate cancer he may benefit from radical prostatectomy in order to treat his retention as well as his prostate cancer.  However, if his PSA is normal or he does not have prostate cancer we can perform simple prostatectomy.  He is in agreement with this plan.  I will have him follow-up in 2 weeks with a PSA prior.

## 2022-11-09 ENCOUNTER — TELEPHONE (OUTPATIENT)
Dept: NEUROSURGERY | Facility: CLINIC | Age: 72
End: 2022-11-09

## 2022-11-09 NOTE — TELEPHONE ENCOUNTER
Patient LVM stating that he had approval to have the myelogram done.  Tried to call patient back to obtain more information, but no answer and no VM set up.  Could not find a reservation sheet for procedure.

## 2022-11-10 ENCOUNTER — HOSPITAL ENCOUNTER (OUTPATIENT)
Facility: HOSPITAL | Age: 72
Setting detail: HOSPITAL OUTPATIENT SURGERY
End: 2022-11-10
Attending: RADIOLOGY | Admitting: RADIOLOGY

## 2022-11-10 DIAGNOSIS — M50.30 DDD (DEGENERATIVE DISC DISEASE), CERVICAL: ICD-10-CM

## 2022-11-14 ENCOUNTER — APPOINTMENT (OUTPATIENT)
Dept: NEUROLOGY | Facility: HOSPITAL | Age: 72
End: 2022-11-14

## 2022-11-14 ENCOUNTER — HOSPITAL ENCOUNTER (OUTPATIENT)
Dept: NEUROLOGY | Facility: HOSPITAL | Age: 72
Discharge: HOME | End: 2022-11-14
Admitting: NEUROLOGICAL SURGERY

## 2022-11-14 DIAGNOSIS — M54.12 CERVICAL RADICULOPATHY: ICD-10-CM

## 2022-11-14 DIAGNOSIS — M50.30 DDD (DEGENERATIVE DISC DISEASE), CERVICAL: ICD-10-CM

## 2022-11-14 PROCEDURE — 95886 MUSC TEST DONE W/N TEST COMP: CPT

## 2022-11-14 PROCEDURE — 95912 NRV CNDJ TEST 11-12 STUDIES: CPT

## 2022-11-14 PROCEDURE — 95911 NRV CNDJ TEST 9-10 STUDIES: CPT

## 2022-11-16 ENCOUNTER — FLU SHOT (OUTPATIENT)
Dept: FAMILY MEDICINE CLINIC | Facility: CLINIC | Age: 72
End: 2022-11-16

## 2022-11-16 ENCOUNTER — LAB (OUTPATIENT)
Dept: LAB | Facility: HOSPITAL | Age: 72
End: 2022-11-16

## 2022-11-16 ENCOUNTER — TELEPHONE (OUTPATIENT)
Dept: UROLOGY | Facility: CLINIC | Age: 72
End: 2022-11-16

## 2022-11-16 DIAGNOSIS — R33.9 URINARY RETENTION: ICD-10-CM

## 2022-11-16 DIAGNOSIS — Z23 NEED FOR INFLUENZA VACCINATION: Primary | ICD-10-CM

## 2022-11-16 DIAGNOSIS — R33.9 URINARY RETENTION: Primary | ICD-10-CM

## 2022-11-16 DIAGNOSIS — M54.12 CERVICAL RADICULOPATHY: Primary | ICD-10-CM

## 2022-11-16 PROCEDURE — 84153 ASSAY OF PSA TOTAL: CPT

## 2022-11-16 PROCEDURE — 90686 IIV4 VACC NO PRSV 0.5 ML IM: CPT | Performed by: FAMILY MEDICINE

## 2022-11-16 PROCEDURE — G0008 ADMIN INFLUENZA VIRUS VAC: HCPCS | Performed by: FAMILY MEDICINE

## 2022-11-16 PROCEDURE — 36415 COLL VENOUS BLD VENIPUNCTURE: CPT

## 2022-11-16 NOTE — TELEPHONE ENCOUNTER
Patient at [facility] trying to get his PSA lab done.  No order in chart.     Please advise Dr. Stinson.

## 2022-11-17 LAB — PSA SERPL-MCNC: 8.51 NG/ML (ref 0–4)

## 2022-11-18 ENCOUNTER — OFFICE VISIT (OUTPATIENT)
Dept: UROLOGY | Facility: CLINIC | Age: 72
End: 2022-11-18

## 2022-11-18 VITALS — WEIGHT: 245 LBS | HEIGHT: 69 IN | BODY MASS INDEX: 36.29 KG/M2

## 2022-11-18 DIAGNOSIS — R97.20 ELEVATED PROSTATE SPECIFIC ANTIGEN (PSA): Primary | ICD-10-CM

## 2022-11-18 PROCEDURE — 99213 OFFICE O/P EST LOW 20 MIN: CPT | Performed by: UROLOGY

## 2022-11-18 RX ORDER — LEVOFLOXACIN 500 MG/1
500 TABLET, FILM COATED ORAL DAILY
Qty: 3 TABLET | Refills: 0 | Status: SHIPPED | OUTPATIENT
Start: 2022-11-18 | End: 2022-11-21

## 2022-11-18 RX ORDER — BISACODYL 10 MG/30ML
10 ENEMA RECTAL ONCE
Qty: 30 ML | Refills: 0 | Status: SHIPPED | OUTPATIENT
Start: 2022-11-18 | End: 2022-11-18
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
## 2022-11-18 NOTE — TELEPHONE ENCOUNTER
[place] finally approved procedures to be done at [facility]. Emg was done 11/14/22. Myelo is scheduled for 11/21/22.

## 2022-11-21 ENCOUNTER — APPOINTMENT (OUTPATIENT)
Dept: CT IMAGING | Facility: HOSPITAL | Age: 72
End: 2022-11-21

## 2022-11-21 ENCOUNTER — HOSPITAL ENCOUNTER (OUTPATIENT)
Facility: HOSPITAL | Age: 72
Setting detail: HOSPITAL OUTPATIENT SURGERY
Discharge: HOME | End: 2022-11-21
Attending: RADIOLOGY | Admitting: RADIOLOGY

## 2022-11-21 VITALS
BODY MASS INDEX: 37.65 KG/M2 | TEMPERATURE: 98.3 F | DIASTOLIC BLOOD PRESSURE: 89 MMHG | WEIGHT: 254.19 LBS | SYSTOLIC BLOOD PRESSURE: 151 MMHG | HEART RATE: 82 BPM | HEIGHT: 69 IN | OXYGEN SATURATION: 98 %

## 2022-11-21 DIAGNOSIS — M54.12 CERVICAL RADICULOPATHY: ICD-10-CM

## 2022-11-21 LAB
GLUCOSE BLDC GLUCOMTR-MCNC: 142 MG/DL (ref 70–130)
GLUCOSE BLDC GLUCOMTR-MCNC: 158 MG/DL (ref 70–130)

## 2022-11-21 PROCEDURE — 62302 MYELOGRAPHY LUMBAR INJECTION: CPT | Performed by: RADIOLOGY

## 2022-11-21 PROCEDURE — 25010000002 IOPAMIDOL 61 % SOLUTION: Performed by: RADIOLOGY

## 2022-11-21 PROCEDURE — 72126 CT NECK SPINE W/DYE: CPT

## 2022-11-21 PROCEDURE — 82962 GLUCOSE BLOOD TEST: CPT

## 2022-11-21 PROCEDURE — S0260 H&P FOR SURGERY: HCPCS

## 2022-11-21 RX ORDER — LIDOCAINE HYDROCHLORIDE 10 MG/ML
INJECTION, SOLUTION EPIDURAL; INFILTRATION; INTRACAUDAL; PERINEURAL
Status: DISCONTINUED | OUTPATIENT
Start: 2022-11-21 | End: 2022-11-21 | Stop reason: HOSPADM

## 2022-11-21 RX ADMIN — IOPAMIDOL 15 ML: 612 INJECTION, SOLUTION INTRATHECAL at 09:46
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
## 2022-11-28 ENCOUNTER — PROCEDURE VISIT (OUTPATIENT)
Dept: UROLOGY | Facility: CLINIC | Age: 72
End: 2022-11-28

## 2022-11-28 VITALS — BODY MASS INDEX: 37.62 KG/M2 | HEIGHT: 69 IN | WEIGHT: 254 LBS

## 2022-11-28 DIAGNOSIS — R97.20 ELEVATED PROSTATE SPECIFIC ANTIGEN (PSA): Primary | ICD-10-CM

## 2022-11-28 PROCEDURE — 55700 PR PROSTATE NEEDLE BIOPSY ANY APPROACH: CPT | Performed by: UROLOGY

## 2022-11-28 PROCEDURE — 76942 ECHO GUIDE FOR BIOPSY: CPT | Performed by: UROLOGY

## 2022-11-28 RX ORDER — CEFTRIAXONE 1 G/1
1 INJECTION, POWDER, FOR SOLUTION INTRAMUSCULAR; INTRAVENOUS ONCE
Status: COMPLETED | OUTPATIENT
Start: 2022-11-28 | End: 2022-11-28

## 2022-11-28 RX ADMIN — CEFTRIAXONE 1 G: 1 INJECTION, POWDER, FOR SOLUTION INTRAMUSCULAR; INTRAVENOUS at 15:37

## 2022-11-28 NOTE — PROGRESS NOTES
Procedure: Transrectal ultrasound with biopsy of prostate, US Guidance     Indication(s) for the procedure include(s): elevated PSA.     Pre-procedure(s): antibiotic(s) was given prior to procedure.     The procedure's risks and benefits were discussed with the patient. Informed consent was obtained prior to the procedure.   We discussed possible complications and risks, including bleeding (urine, bowel, ejaculate), infection, urinary retention, pain and sampling error.     Prior to the start of the procedure a time out was taken and the identity of the patient was confirmed via name and date of birth with the patient. The positioning of the patient was verified. The availability of the correct equipment was verified.     The patient was placed in the left lateral decubitus position. The 7.0 mHz biplanar transrectal ultrasound probe was placed per rectum. Imaging in transverse and longitudinal views revealed the findings below.  Prior to biopsy, local anesthesia (1% Xylocaine) was injected into the prostatic capsule.  Biopsies were performed with the biopsy device as noted below.    Findings:   Digital rectal exam:  No nodules.    Prostate Volume: 140 ml.  Seminal Vesicles: normal   12-core biopsy template performed; Biopsies were taken from the base, midline apex, and other areas as indicated bilaterally.   The patient tolerated the procedure well.     Complications: none.     Patient Instructions: Call for temp > 101, inability to urinate, chills, continuous urinary/bowel bleeding and intolerable pain.  F/u 1 week

## 2022-11-29 LAB — REF LAB TEST METHOD: NORMAL

## 2022-12-02 ENCOUNTER — OFFICE VISIT (OUTPATIENT)
Dept: FAMILY MEDICINE CLINIC | Facility: CLINIC | Age: 72
End: 2022-12-02

## 2022-12-02 VITALS
WEIGHT: 250.2 LBS | DIASTOLIC BLOOD PRESSURE: 80 MMHG | HEIGHT: 69 IN | RESPIRATION RATE: 20 BRPM | HEART RATE: 74 BPM | OXYGEN SATURATION: 98 % | TEMPERATURE: 97.1 F | SYSTOLIC BLOOD PRESSURE: 160 MMHG | BODY MASS INDEX: 37.06 KG/M2

## 2022-12-02 DIAGNOSIS — I10 ESSENTIAL HYPERTENSION: ICD-10-CM

## 2022-12-02 DIAGNOSIS — E11.65 TYPE 2 DIABETES MELLITUS WITH HYPERGLYCEMIA, WITHOUT LONG-TERM CURRENT USE OF INSULIN: Primary | ICD-10-CM

## 2022-12-02 DIAGNOSIS — E03.8 OTHER SPECIFIED HYPOTHYROIDISM: ICD-10-CM

## 2022-12-02 PROBLEM — R33.8 ACUTE URINARY RETENTION: Status: RESOLVED | Noted: 2022-10-16 | Resolved: 2022-12-02

## 2022-12-02 PROCEDURE — 99214 OFFICE O/P EST MOD 30 MIN: CPT | Performed by: FAMILY MEDICINE

## 2022-12-02 NOTE — ASSESSMENT & PLAN NOTE
Diabetes is Stable.   Medication changes per orders.  Diabetes will be reassessed 6 weeks.  Glipizide will be discontinued  Metformin will be taken once daily with his largest meal which is separate.  Patient will contact the office for further hypoglycemia

## 2022-12-02 NOTE — ASSESSMENT & PLAN NOTE
Hypertension is Not at goal.  Dietary sodium restriction.  Regular aerobic exercise.  BMP ordered.  If renal function has returned to normal ACE inhibitor will be started  Blood pressure will be reassessed at the next regular appointment.

## 2022-12-02 NOTE — PROGRESS NOTES
"I am  Chief Complaint   Patient presents with   • FU from neurological tests    • Diabetes     Has been getting glucose fluctuations / random glucose 149    • RF: Free Style Kera sensors (print Rx)      Pt will need to hand carry this to [facility]       Subjective      Cj Cifuentes is a 72 y.o. 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    Diabetes mellitus.  Continuous glucose monitor shows in the last 5 days patient's blood sugars have been within range and have been been in the hypoglycemic range occasionally.  Patient has stopped taking both his glipizide and his metformin due to the low blood sugars.    Hypertension.  Patient reports blood  pressures at home between 120 and high 140 systolic.    Hypothyroidism.  Identified during patient's hospitalization for acute kidney injury from obstructive uropathy.  Patient is currently taking Synthroid 50 mcg daily.  Patient needs monitoring of this.     BPH.  Patient had prostate biopsy earlier in the week and will be receiving results shortly    Cervical myelopathy.  Patient had nerve conduction studies and EMG which she has been led to believe are abnormal.  He has follow-up later this month.    Objective   Vital Signs:  /80   Pulse 74   Temp 97.1 °F (36.2 °C)   Resp 20   Ht 175.3 cm (69.02\")   Wt 113 kg (250 lb 3.2 oz)   SpO2 98%   BMI 36.93 kg/m²     Physical Exam  Cardiovascular:      Rate and Rhythm: Normal rate and regular rhythm.      Pulses: Normal pulses.      Heart sounds: Normal heart sounds.   Pulmonary:      Effort: Pulmonary effort is normal.      Breath sounds: Normal breath sounds.   Neurological:      Mental Status: He is alert.          Result Review                     Assessment and Plan  Diagnoses and all orders for this visit:    1. Type 2 diabetes mellitus with hyperglycemia, without long-term current use of insulin (HCC) " (Primary)  Assessment & Plan:  Diabetes is Stable.   Medication changes per orders.  Diabetes will be reassessed 6 weeks.  Glipizide will be discontinued  Metformin will be taken once daily with his largest meal which is separate.  Patient will contact the office for further hypoglycemia    Orders:  -     Continuous Blood Gluc Sensor (FreeStyle Kera 2 Sensor) misc; 1 each Every 14 (Fourteen) Days.  Dispense: 2 each; Refill: 11  -     Basic Metabolic Panel    2. Other specified hypothyroidism  Assessment & Plan:  TSH ordered today.    Orders:  -     TSH    3. Essential hypertension  Assessment & Plan:  Hypertension is Not at goal.  Dietary sodium restriction.  Regular aerobic exercise.  BMP ordered.  If renal function has returned to normal ACE inhibitor will be started  Blood pressure will be reassessed at the next regular appointment.            Follow Up  Return in about 6 weeks (around 1/13/2023) for Recheck.  Patient was given instructions and counseling regarding his condition or for health maintenance advice. Please see specific information pulled into the AVS if appropriate.

## 2022-12-03 LAB
BUN SERPL-MCNC: 9 MG/DL (ref 8–27)
BUN/CREAT SERPL: 11 (ref 10–24)
CALCIUM SERPL-MCNC: 9.3 MG/DL (ref 8.6–10.2)
CHLORIDE SERPL-SCNC: 101 MMOL/L (ref 96–106)
CO2 SERPL-SCNC: 21 MMOL/L (ref 20–29)
CREAT SERPL-MCNC: 0.83 MG/DL (ref 0.76–1.27)
EGFRCR SERPLBLD CKD-EPI 2021: 94 ML/MIN/1.73
GLUCOSE SERPL-MCNC: 164 MG/DL (ref 70–99)
POTASSIUM SERPL-SCNC: 4.2 MMOL/L (ref 3.5–5.2)
SODIUM SERPL-SCNC: 138 MMOL/L (ref 134–144)
TSH SERPL DL<=0.005 MIU/L-ACNC: 3.1 UIU/ML (ref 0.45–4.5)

## 2022-12-08 ENCOUNTER — OFFICE VISIT (OUTPATIENT)
Dept: NEUROSURGERY | Facility: CLINIC | Age: 72
End: 2022-12-08

## 2022-12-08 ENCOUNTER — OFFICE VISIT (OUTPATIENT)
Dept: UROLOGY | Facility: CLINIC | Age: 72
End: 2022-12-08

## 2022-12-08 VITALS — BODY MASS INDEX: 37.03 KG/M2 | HEIGHT: 69 IN | WEIGHT: 250 LBS

## 2022-12-08 VITALS
SYSTOLIC BLOOD PRESSURE: 138 MMHG | HEART RATE: 102 BPM | TEMPERATURE: 98.8 F | BODY MASS INDEX: 36.43 KG/M2 | HEIGHT: 69 IN | WEIGHT: 246 LBS | DIASTOLIC BLOOD PRESSURE: 75 MMHG

## 2022-12-08 DIAGNOSIS — G62.9 SENSORIMOTOR NEUROPATHY: ICD-10-CM

## 2022-12-08 DIAGNOSIS — R33.9 URINARY RETENTION: Primary | ICD-10-CM

## 2022-12-08 DIAGNOSIS — R31.9 HEMATURIA, UNSPECIFIED TYPE: ICD-10-CM

## 2022-12-08 DIAGNOSIS — M47.812 CERVICAL SPONDYLOSIS: Primary | ICD-10-CM

## 2022-12-08 DIAGNOSIS — M47.819 FACET ARTHROPATHY: ICD-10-CM

## 2022-12-08 PROBLEM — N40.1 URINARY RETENTION DUE TO BENIGN PROSTATIC HYPERPLASIA: Status: ACTIVE | Noted: 2022-12-08

## 2022-12-08 PROBLEM — R33.8 URINARY RETENTION DUE TO BENIGN PROSTATIC HYPERPLASIA: Status: ACTIVE | Noted: 2022-12-08

## 2022-12-08 PROCEDURE — 99214 OFFICE O/P EST MOD 30 MIN: CPT | Performed by: UROLOGY

## 2022-12-08 PROCEDURE — 99214 OFFICE O/P EST MOD 30 MIN: CPT | Performed by: NEUROLOGICAL SURGERY
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
## 2022-12-19 ENCOUNTER — PRE-ADMISSION TESTING (OUTPATIENT)
Dept: PREADMISSION TESTING | Facility: HOSPITAL | Age: 72
End: 2022-12-19

## 2022-12-19 VITALS — HEIGHT: 69 IN | WEIGHT: 249.56 LBS | BODY MASS INDEX: 36.96 KG/M2

## 2022-12-19 DIAGNOSIS — E11.65 TYPE 2 DIABETES MELLITUS WITH HYPERGLYCEMIA, WITHOUT LONG-TERM CURRENT USE OF INSULIN: Primary | ICD-10-CM

## 2022-12-19 DIAGNOSIS — R33.9 URINARY RETENTION: ICD-10-CM

## 2022-12-19 LAB
ANION GAP SERPL CALCULATED.3IONS-SCNC: 10 MMOL/L (ref 5–15)
BACTERIA UR QL AUTO: ABNORMAL /HPF
BILIRUB UR QL STRIP: NEGATIVE
BUN SERPL-MCNC: 8 MG/DL (ref 8–23)
BUN/CREAT SERPL: 10.5 (ref 7–25)
CALCIUM SPEC-SCNC: 9.7 MG/DL (ref 8.6–10.5)
CHLORIDE SERPL-SCNC: 100 MMOL/L (ref 98–107)
CLARITY UR: ABNORMAL
CO2 SERPL-SCNC: 27 MMOL/L (ref 22–29)
COD CRY URNS QL: ABNORMAL /HPF
COLOR UR: YELLOW
CREAT SERPL-MCNC: 0.76 MG/DL (ref 0.76–1.27)
DEPRECATED RDW RBC AUTO: 44.6 FL (ref 37–54)
EGFRCR SERPLBLD CKD-EPI 2021: 96.7 ML/MIN/1.73
ERYTHROCYTE [DISTWIDTH] IN BLOOD BY AUTOMATED COUNT: 14 % (ref 12.3–15.4)
GLUCOSE SERPL-MCNC: 219 MG/DL (ref 65–99)
GLUCOSE UR STRIP-MCNC: ABNORMAL MG/DL
HBA1C MFR BLD: 7 % (ref 4.8–5.6)
HCT VFR BLD AUTO: 43.5 % (ref 37.5–51)
HGB BLD-MCNC: 14.8 G/DL (ref 13–17.7)
HGB UR QL STRIP.AUTO: ABNORMAL
HYALINE CASTS UR QL AUTO: ABNORMAL /LPF
INR PPP: 1.03 (ref 0.84–1.13)
KETONES UR QL STRIP: NEGATIVE
LEUKOCYTE ESTERASE UR QL STRIP.AUTO: ABNORMAL
MCH RBC QN AUTO: 29.8 PG (ref 26.6–33)
MCHC RBC AUTO-ENTMCNC: 34 G/DL (ref 31.5–35.7)
MCV RBC AUTO: 87.5 FL (ref 79–97)
NITRITE UR QL STRIP: NEGATIVE
PH UR STRIP.AUTO: 6 [PH] (ref 5–8)
PLATELET # BLD AUTO: 261 10*3/MM3 (ref 140–450)
PMV BLD AUTO: 9.8 FL (ref 6–12)
POTASSIUM SERPL-SCNC: 4 MMOL/L (ref 3.5–5.2)
PROT UR QL STRIP: ABNORMAL
PROTHROMBIN TIME: 13.4 SECONDS (ref 11.4–14.4)
RBC # BLD AUTO: 4.97 10*6/MM3 (ref 4.14–5.8)
RBC # UR STRIP: ABNORMAL /HPF
REF LAB TEST METHOD: ABNORMAL
SODIUM SERPL-SCNC: 137 MMOL/L (ref 136–145)
SP GR UR STRIP: 1.02 (ref 1–1.03)
SQUAMOUS #/AREA URNS HPF: ABNORMAL /HPF
UROBILINOGEN UR QL STRIP: ABNORMAL
WBC # UR STRIP: ABNORMAL /HPF
WBC NRBC COR # BLD: 9.45 10*3/MM3 (ref 3.4–10.8)

## 2022-12-19 PROCEDURE — 87086 URINE CULTURE/COLONY COUNT: CPT

## 2022-12-19 PROCEDURE — 93005 ELECTROCARDIOGRAM TRACING: CPT

## 2022-12-19 PROCEDURE — 93010 ELECTROCARDIOGRAM REPORT: CPT | Performed by: INTERNAL MEDICINE

## 2022-12-19 PROCEDURE — 83036 HEMOGLOBIN GLYCOSYLATED A1C: CPT

## 2022-12-19 PROCEDURE — 36415 COLL VENOUS BLD VENIPUNCTURE: CPT

## 2022-12-19 PROCEDURE — 80048 BASIC METABOLIC PNL TOTAL CA: CPT

## 2022-12-19 PROCEDURE — 81001 URINALYSIS AUTO W/SCOPE: CPT

## 2022-12-19 PROCEDURE — 85027 COMPLETE CBC AUTOMATED: CPT

## 2022-12-19 PROCEDURE — 85610 PROTHROMBIN TIME: CPT

## 2022-12-19 NOTE — PAT
Patient viewed general PAT education video as instructed in their preoperative information received from their surgeon.  Patient stated the general PAT education video was viewed in its entirety and survey completed.  Copies of [facility] general education handouts (Incentive Spirometry, Meds to Beds Program, Patient Belongings, Pre-op skin preparation instructions, Blood Glucose testing, Visitor policy, Surgery FAQ, Code H) distributed to patient if not printed. Education related to the PAT pass and skin preparation for surgery (if applicable) completed in PAT as a reinforcement to PAT education video. Patient instructed to return PAT pass provided today as well as completed skin preparation sheet (if applicable) on the day of procedure.     Additionally if patient had not viewed video yet but intended to view it at home or in our waiting area, then referred them to the handout with QR code/link provided during PAT visit.  Instructed patient to complete survey after viewing the video in its entirety.  Encouraged patient/family to read [facility] general education handouts thoroughly and notify PAT staff with any questions or concerns. Patient verbalized understanding of all information and priority content.    An arrival time for procedure was not provided during PAT visit. If patient had any questions or concerns about their arrival time, they were instructed to contact their surgeon/physician.  Additionally, if the patient referred to an arrival time that was acquired from their my chart account, patient was encouraged to verify that time with their surgeon/physician. Arrival times are NOT provided in Pre Admission Testing Department.    Patient to apply Chlorhexadine wipes  to surgical area (as instructed) the night before procedure and the AM of procedure. Wipes provided.    Patient denies any current skin issues.

## 2022-12-20 ENCOUNTER — TELEPHONE (OUTPATIENT)
Dept: NEUROSURGERY | Facility: CLINIC | Age: 72
End: 2022-12-20

## 2022-12-20 LAB — BACTERIA SPEC AEROBE CULT: NORMAL

## 2022-12-20 NOTE — TELEPHONE ENCOUNTER
Caller: Cj Cifuentes    Relationship to patient: Self    Best call back number: [phone]    Patient is needing:     PATIENT STATES WHEN HE WAS IN TO SEE DR KNENEDY LAST HE FORGOT TO ASK ABOUT RETURNING TO WORK.  PATIENT SAID HE HAD A LETTER THAT SAID HE WAS TO NOT WORK UNTIL 12/19, BUT DIDN'T KNOW IF THAT MEANT HE SHOULD RTW OR IF THAT TIME OFF SHOULD BE EXTENDED.    [employer] HAS NOT ASKED YET, BUT IS EXPECTING THEM TO DO SO SOON.    PLEASE CALL TO ADVISE.

## 2022-12-21 NOTE — TELEPHONE ENCOUNTER
Caller: Cj Cifuentes    Relationship to patient: Self    Best call back number: [phone]    Patient is needing: PATIENT CALLED, PATIENT PROVIDED FAX NUMBER:[phone]. PLEASE CALL PATIENT TO ADVISE IT HAS BEEN FAXED.    THANK YOU

## 2022-12-21 NOTE — TELEPHONE ENCOUNTER
Called and notified patient, he will be calling us back with the designated fax number to seen his RTW letter.

## 2022-12-22 ENCOUNTER — ANESTHESIA EVENT (OUTPATIENT)
Dept: PERIOP | Facility: HOSPITAL | Age: 72
End: 2022-12-22

## 2022-12-22 RX ORDER — FAMOTIDINE 10 MG/ML
20 INJECTION, SOLUTION INTRAVENOUS ONCE
Status: CANCELLED | OUTPATIENT
Start: 2022-12-22 | End: 2022-12-22

## 2022-12-22 RX ORDER — SODIUM CHLORIDE 0.9 % (FLUSH) 0.9 %
10 SYRINGE (ML) INJECTION AS NEEDED
Status: CANCELLED | OUTPATIENT
Start: 2022-12-22

## 2022-12-22 RX ORDER — SODIUM CHLORIDE 0.9 % (FLUSH) 0.9 %
10 SYRINGE (ML) INJECTION EVERY 12 HOURS SCHEDULED
Status: CANCELLED | OUTPATIENT
Start: 2022-12-22

## 2022-12-22 RX ORDER — SODIUM CHLORIDE 9 MG/ML
40 INJECTION, SOLUTION INTRAVENOUS AS NEEDED
Status: CANCELLED | OUTPATIENT
Start: 2022-12-22

## 2022-12-23 ENCOUNTER — ANESTHESIA EVENT CONVERTED (OUTPATIENT)
Dept: ANESTHESIOLOGY | Facility: HOSPITAL | Age: 72
End: 2022-12-23

## 2022-12-23 ENCOUNTER — HOSPITAL ENCOUNTER (OUTPATIENT)
Facility: HOSPITAL | Age: 72
Discharge: HOME | End: 2022-12-25
Attending: UROLOGY | Admitting: UROLOGY

## 2022-12-23 ENCOUNTER — ANESTHESIA (OUTPATIENT)
Dept: PERIOP | Facility: HOSPITAL | Age: 72
End: 2022-12-23

## 2022-12-23 DIAGNOSIS — R33.9 URINARY RETENTION: ICD-10-CM

## 2022-12-23 DIAGNOSIS — R33.8 BENIGN PROSTATIC HYPERPLASIA WITH URINARY RETENTION: Primary | ICD-10-CM

## 2022-12-23 DIAGNOSIS — N40.1 BENIGN PROSTATIC HYPERPLASIA WITH URINARY RETENTION: Primary | ICD-10-CM

## 2022-12-23 LAB
ANION GAP SERPL CALCULATED.3IONS-SCNC: 14 MMOL/L (ref 5–15)
BUN SERPL-MCNC: 12 MG/DL (ref 8–23)
BUN/CREAT SERPL: 13.5 (ref 7–25)
CALCIUM SPEC-SCNC: 8.5 MG/DL (ref 8.6–10.5)
CHLORIDE SERPL-SCNC: 102 MMOL/L (ref 98–107)
CO2 SERPL-SCNC: 19 MMOL/L (ref 22–29)
CREAT SERPL-MCNC: 0.89 MG/DL (ref 0.76–1.27)
DEPRECATED RDW RBC AUTO: 42.9 FL (ref 37–54)
EGFRCR SERPLBLD CKD-EPI 2021: 92.2 ML/MIN/1.73
ERYTHROCYTE [DISTWIDTH] IN BLOOD BY AUTOMATED COUNT: 13.8 % (ref 12.3–15.4)
GLUCOSE BLDC GLUCOMTR-MCNC: 182 MG/DL (ref 70–130)
GLUCOSE BLDC GLUCOMTR-MCNC: 217 MG/DL (ref 70–130)
GLUCOSE BLDC GLUCOMTR-MCNC: 252 MG/DL (ref 70–130)
GLUCOSE SERPL-MCNC: 277 MG/DL (ref 65–99)
HCT VFR BLD AUTO: 33.5 % (ref 37.5–51)
HGB BLD-MCNC: 12 G/DL (ref 13–17.7)
MCH RBC QN AUTO: 30.1 PG (ref 26.6–33)
MCHC RBC AUTO-ENTMCNC: 35.8 G/DL (ref 31.5–35.7)
MCV RBC AUTO: 84 FL (ref 79–97)
PLATELET # BLD AUTO: 203 10*3/MM3 (ref 140–450)
PMV BLD AUTO: 10 FL (ref 6–12)
POTASSIUM SERPL-SCNC: 4.4 MMOL/L (ref 3.5–5.2)
QT INTERVAL: 368 MS
QTC INTERVAL: 424 MS
RBC # BLD AUTO: 3.99 10*6/MM3 (ref 4.14–5.8)
SODIUM SERPL-SCNC: 135 MMOL/L (ref 136–145)
WBC NRBC COR # BLD: 11.29 10*3/MM3 (ref 3.4–10.8)

## 2022-12-23 PROCEDURE — 82962 GLUCOSE BLOOD TEST: CPT

## 2022-12-23 PROCEDURE — 25010000002 HYDROMORPHONE 1 MG/ML SOLUTION: Performed by: UROLOGY

## 2022-12-23 PROCEDURE — A9270 NON-COVERED ITEM OR SERVICE: HCPCS

## 2022-12-23 PROCEDURE — A9270 NON-COVERED ITEM OR SERVICE: HCPCS | Performed by: UROLOGY

## 2022-12-23 PROCEDURE — 88344 IMHCHEM/IMCYTCHM EA MLT ANTB: CPT | Performed by: UROLOGY

## 2022-12-23 PROCEDURE — 63710000001 HYDROCODONE-ACETAMINOPHEN 7.5-325 MG TABLET: Performed by: UROLOGY

## 2022-12-23 PROCEDURE — 25010000002 DEXAMETHASONE PER 1 MG: Performed by: NURSE ANESTHETIST, CERTIFIED REGISTERED

## 2022-12-23 PROCEDURE — 25010000002 CEFAZOLIN IN DEXTROSE 2-4 GM/100ML-% SOLUTION: Performed by: UROLOGY

## 2022-12-23 PROCEDURE — 55899 UNLISTED PX MALE GENITAL SYS: CPT | Performed by: UROLOGY

## 2022-12-23 PROCEDURE — 80048 BASIC METABOLIC PNL TOTAL CA: CPT | Performed by: UROLOGY

## 2022-12-23 PROCEDURE — P9041 ALBUMIN (HUMAN),5%, 50ML: HCPCS | Performed by: NURSE ANESTHETIST, CERTIFIED REGISTERED

## 2022-12-23 PROCEDURE — 63710000001 PREGABALIN 75 MG CAPSULE: Performed by: UROLOGY

## 2022-12-23 PROCEDURE — 63710000001 INSULIN LISPRO (HUMAN) PER 5 UNITS

## 2022-12-23 PROCEDURE — 63710000001 FAMOTIDINE 20 MG TABLET: Performed by: ANESTHESIOLOGY

## 2022-12-23 PROCEDURE — 25010000002 FENTANYL CITRATE (PF) 100 MCG/2ML SOLUTION: Performed by: NURSE ANESTHETIST, CERTIFIED REGISTERED

## 2022-12-23 PROCEDURE — A9270 NON-COVERED ITEM OR SERVICE: HCPCS | Performed by: ANESTHESIOLOGY

## 2022-12-23 PROCEDURE — 25010000002 HEPARIN (PORCINE) PER 1000 UNITS: Performed by: UROLOGY

## 2022-12-23 PROCEDURE — 25010000002 DEXAMETHASONE SODIUM PHOSPHATE 10 MG/ML SOLUTION: Performed by: NURSE ANESTHETIST, CERTIFIED REGISTERED

## 2022-12-23 PROCEDURE — 63710000001 OXYCODONE-ACETAMINOPHEN 5-325 MG TABLET: Performed by: UROLOGY

## 2022-12-23 PROCEDURE — 25010000002 KETOROLAC TROMETHAMINE PER 15 MG: Performed by: UROLOGY

## 2022-12-23 PROCEDURE — 25010000002 HYDRALAZINE PER 20 MG: Performed by: NURSE ANESTHETIST, CERTIFIED REGISTERED

## 2022-12-23 PROCEDURE — 25010000002 PROPOFOL 10 MG/ML EMULSION: Performed by: NURSE ANESTHETIST, CERTIFIED REGISTERED

## 2022-12-23 PROCEDURE — 63710000001 INSULIN LISPRO (HUMAN) PER 5 UNITS: Performed by: INTERNAL MEDICINE

## 2022-12-23 PROCEDURE — A9270 NON-COVERED ITEM OR SERVICE: HCPCS | Performed by: INTERNAL MEDICINE

## 2022-12-23 PROCEDURE — 25010000002 ONDANSETRON PER 1 MG: Performed by: NURSE ANESTHETIST, CERTIFIED REGISTERED

## 2022-12-23 PROCEDURE — S0260 H&P FOR SURGERY: HCPCS | Performed by: UROLOGY

## 2022-12-23 PROCEDURE — 63710000001 ATORVASTATIN 20 MG TABLET: Performed by: UROLOGY

## 2022-12-23 PROCEDURE — 25010000002 ALBUMIN HUMAN 5% PER 50 ML: Performed by: NURSE ANESTHETIST, CERTIFIED REGISTERED

## 2022-12-23 PROCEDURE — 88307 TISSUE EXAM BY PATHOLOGIST: CPT | Performed by: UROLOGY

## 2022-12-23 PROCEDURE — 99212 OFFICE O/P EST SF 10 MIN: CPT | Performed by: NURSE PRACTITIONER

## 2022-12-23 PROCEDURE — 85027 COMPLETE CBC AUTOMATED: CPT | Performed by: UROLOGY

## 2022-12-23 DEVICE — FLOSEAL HEMOSTATIC MATRIX, 10ML
Type: IMPLANTABLE DEVICE | Site: PELVIS | Status: FUNCTIONAL
Brand: FLOSEAL HEMOSTATIC MATRIX

## 2022-12-23 DEVICE — ABSORBABLE WOUND CLOSURE DEVICE
Type: IMPLANTABLE DEVICE | Site: PELVIS | Status: FUNCTIONAL
Brand: V-LOC 180

## 2022-12-23 DEVICE — ABSORBABLE WOUND CLOSURE DEVICE
Type: IMPLANTABLE DEVICE | Site: PELVIS | Status: FUNCTIONAL
Brand: V-LOC 90

## 2022-12-23 DEVICE — ABSORBABLE HEMOSTAT (OXIDIZED REGENERATED CELLULOSE, U.S.P.)
Type: IMPLANTABLE DEVICE | Site: PELVIS | Status: FUNCTIONAL
Brand: SURGICEL

## 2022-12-23 RX ORDER — BUPIVACAINE HYDROCHLORIDE 2.5 MG/ML
INJECTION, SOLUTION EPIDURAL; INFILTRATION; INTRACAUDAL
Status: COMPLETED | OUTPATIENT
Start: 2022-12-23 | End: 2022-12-23

## 2022-12-23 RX ORDER — LIDOCAINE HYDROCHLORIDE 10 MG/ML
0.5 INJECTION, SOLUTION EPIDURAL; INFILTRATION; INTRACAUDAL; PERINEURAL ONCE AS NEEDED
Status: COMPLETED | OUTPATIENT
Start: 2022-12-23 | End: 2022-12-23

## 2022-12-23 RX ORDER — MAGNESIUM HYDROXIDE 1200 MG/15ML
LIQUID ORAL AS NEEDED
Status: DISCONTINUED | OUTPATIENT
Start: 2022-12-23 | End: 2022-12-23 | Stop reason: HOSPADM

## 2022-12-23 RX ORDER — FAMOTIDINE 20 MG/1
20 TABLET, FILM COATED ORAL ONCE
Status: COMPLETED | OUTPATIENT
Start: 2022-12-23 | End: 2022-12-23

## 2022-12-23 RX ORDER — MIDAZOLAM HYDROCHLORIDE 1 MG/ML
0.5 INJECTION INTRAMUSCULAR; INTRAVENOUS
Status: DISCONTINUED | OUTPATIENT
Start: 2022-12-23 | End: 2022-12-23 | Stop reason: HOSPADM

## 2022-12-23 RX ORDER — BUPIVACAINE HYDROCHLORIDE 2.5 MG/ML
INJECTION, SOLUTION EPIDURAL; INFILTRATION; INTRACAUDAL
Status: COMPLETED
Start: 2022-12-23 | End: 2022-12-23

## 2022-12-23 RX ORDER — INSULIN LISPRO 100 [IU]/ML
0-9 INJECTION, SOLUTION INTRAVENOUS; SUBCUTANEOUS
Status: DISCONTINUED | OUTPATIENT
Start: 2022-12-23 | End: 2022-12-23 | Stop reason: HOSPADM

## 2022-12-23 RX ORDER — CEFAZOLIN SODIUM 2 G/100ML
2 INJECTION, SOLUTION INTRAVENOUS EVERY 8 HOURS
Status: COMPLETED | OUTPATIENT
Start: 2022-12-23 | End: 2022-12-24

## 2022-12-23 RX ORDER — PROPOFOL 10 MG/ML
VIAL (ML) INTRAVENOUS AS NEEDED
Status: DISCONTINUED | OUTPATIENT
Start: 2022-12-23 | End: 2022-12-23 | Stop reason: SURG

## 2022-12-23 RX ORDER — ACETAMINOPHEN 325 MG/1
650 TABLET ORAL EVERY 4 HOURS PRN
Status: DISCONTINUED | OUTPATIENT
Start: 2022-12-23 | End: 2022-12-25 | Stop reason: HOSPADM

## 2022-12-23 RX ORDER — ATROPA BELLADONNA AND OPIUM 16.2; 3 MG/1; MG/1
30 SUPPOSITORY RECTAL DAILY PRN
Status: DISCONTINUED | OUTPATIENT
Start: 2022-12-23 | End: 2022-12-24

## 2022-12-23 RX ORDER — TAMSULOSIN HYDROCHLORIDE 0.4 MG/1
0.4 CAPSULE ORAL DAILY
Status: DISCONTINUED | OUTPATIENT
Start: 2022-12-23 | End: 2022-12-25 | Stop reason: HOSPADM

## 2022-12-23 RX ORDER — HYDROCODONE BITARTRATE AND ACETAMINOPHEN 7.5; 325 MG/1; MG/1
2 TABLET ORAL EVERY 4 HOURS PRN
Status: DISCONTINUED | OUTPATIENT
Start: 2022-12-23 | End: 2022-12-25 | Stop reason: HOSPADM

## 2022-12-23 RX ORDER — ALBUMIN, HUMAN INJ 5% 5 %
SOLUTION INTRAVENOUS CONTINUOUS PRN
Status: DISCONTINUED | OUTPATIENT
Start: 2022-12-23 | End: 2022-12-23 | Stop reason: SURG

## 2022-12-23 RX ORDER — INSULIN LISPRO 100 [IU]/ML
0-7 INJECTION, SOLUTION INTRAVENOUS; SUBCUTANEOUS
Status: DISCONTINUED | OUTPATIENT
Start: 2022-12-23 | End: 2022-12-25 | Stop reason: HOSPADM

## 2022-12-23 RX ORDER — ONDANSETRON 2 MG/ML
INJECTION INTRAMUSCULAR; INTRAVENOUS AS NEEDED
Status: DISCONTINUED | OUTPATIENT
Start: 2022-12-23 | End: 2022-12-23 | Stop reason: SURG

## 2022-12-23 RX ORDER — INSULIN LISPRO 100 [IU]/ML
INJECTION, SOLUTION INTRAVENOUS; SUBCUTANEOUS
Status: COMPLETED
Start: 2022-12-23 | End: 2022-12-23

## 2022-12-23 RX ORDER — SODIUM CHLORIDE 0.9 % (FLUSH) 0.9 %
10 SYRINGE (ML) INJECTION AS NEEDED
Status: DISCONTINUED | OUTPATIENT
Start: 2022-12-23 | End: 2022-12-25 | Stop reason: HOSPADM

## 2022-12-23 RX ORDER — SODIUM CHLORIDE 9 MG/ML
75 INJECTION, SOLUTION INTRAVENOUS CONTINUOUS
Status: DISCONTINUED | OUTPATIENT
Start: 2022-12-23 | End: 2022-12-25 | Stop reason: HOSPADM

## 2022-12-23 RX ORDER — NALOXONE HCL 0.4 MG/ML
0.1 VIAL (ML) INJECTION
Status: DISCONTINUED | OUTPATIENT
Start: 2022-12-23 | End: 2022-12-25 | Stop reason: HOSPADM

## 2022-12-23 RX ORDER — HEPARIN SODIUM 5000 [USP'U]/ML
INJECTION, SOLUTION INTRAVENOUS; SUBCUTANEOUS AS NEEDED
Status: DISCONTINUED | OUTPATIENT
Start: 2022-12-23 | End: 2022-12-23 | Stop reason: HOSPADM

## 2022-12-23 RX ORDER — PREGABALIN 75 MG/1
75 CAPSULE ORAL 2 TIMES DAILY
Status: DISCONTINUED | OUTPATIENT
Start: 2022-12-23 | End: 2022-12-25 | Stop reason: HOSPADM

## 2022-12-23 RX ORDER — SODIUM CHLORIDE 0.9 % (FLUSH) 0.9 %
10 SYRINGE (ML) INJECTION EVERY 12 HOURS SCHEDULED
Status: DISCONTINUED | OUTPATIENT
Start: 2022-12-23 | End: 2022-12-25 | Stop reason: HOSPADM

## 2022-12-23 RX ORDER — LABETALOL HYDROCHLORIDE 5 MG/ML
INJECTION, SOLUTION INTRAVENOUS AS NEEDED
Status: DISCONTINUED | OUTPATIENT
Start: 2022-12-23 | End: 2022-12-23 | Stop reason: SURG

## 2022-12-23 RX ORDER — EPHEDRINE SULFATE 50 MG/ML
INJECTION INTRAVENOUS
Status: COMPLETED
Start: 2022-12-23 | End: 2022-12-23

## 2022-12-23 RX ORDER — EPHEDRINE SULFATE 50 MG/ML
INJECTION INTRAVENOUS AS NEEDED
Status: DISCONTINUED | OUTPATIENT
Start: 2022-12-23 | End: 2022-12-23 | Stop reason: SURG

## 2022-12-23 RX ORDER — KETOROLAC TROMETHAMINE 15 MG/ML
15 INJECTION, SOLUTION INTRAMUSCULAR; INTRAVENOUS EVERY 6 HOURS PRN
Status: DISCONTINUED | OUTPATIENT
Start: 2022-12-23 | End: 2022-12-25 | Stop reason: HOSPADM

## 2022-12-23 RX ORDER — DOCUSATE SODIUM 100 MG/1
100 CAPSULE, LIQUID FILLED ORAL 2 TIMES DAILY PRN
Status: DISCONTINUED | OUTPATIENT
Start: 2022-12-23 | End: 2022-12-25 | Stop reason: HOSPADM

## 2022-12-23 RX ORDER — LIDOCAINE HYDROCHLORIDE 10 MG/ML
INJECTION, SOLUTION EPIDURAL; INFILTRATION; INTRACAUDAL; PERINEURAL AS NEEDED
Status: DISCONTINUED | OUTPATIENT
Start: 2022-12-23 | End: 2022-12-23 | Stop reason: SURG

## 2022-12-23 RX ORDER — OXYCODONE HYDROCHLORIDE AND ACETAMINOPHEN 5; 325 MG/1; MG/1
1 TABLET ORAL EVERY 4 HOURS PRN
Status: DISCONTINUED | OUTPATIENT
Start: 2022-12-23 | End: 2022-12-25 | Stop reason: HOSPADM

## 2022-12-23 RX ORDER — ATORVASTATIN CALCIUM 20 MG/1
20 TABLET, FILM COATED ORAL NIGHTLY
Status: DISCONTINUED | OUTPATIENT
Start: 2022-12-23 | End: 2022-12-25 | Stop reason: HOSPADM

## 2022-12-23 RX ORDER — HEPARIN SODIUM 5000 [USP'U]/ML
5000 INJECTION, SOLUTION INTRAVENOUS; SUBCUTANEOUS EVERY 8 HOURS SCHEDULED
Status: DISCONTINUED | OUTPATIENT
Start: 2022-12-24 | End: 2022-12-25 | Stop reason: HOSPADM

## 2022-12-23 RX ORDER — SODIUM CHLORIDE, SODIUM LACTATE, POTASSIUM CHLORIDE, CALCIUM CHLORIDE 600; 310; 30; 20 MG/100ML; MG/100ML; MG/100ML; MG/100ML
9 INJECTION, SOLUTION INTRAVENOUS CONTINUOUS
Status: DISCONTINUED | OUTPATIENT
Start: 2022-12-23 | End: 2022-12-23 | Stop reason: HOSPADM

## 2022-12-23 RX ORDER — ROCURONIUM BROMIDE 10 MG/ML
INJECTION, SOLUTION INTRAVENOUS AS NEEDED
Status: DISCONTINUED | OUTPATIENT
Start: 2022-12-23 | End: 2022-12-23 | Stop reason: SURG

## 2022-12-23 RX ORDER — DEXAMETHASONE SODIUM PHOSPHATE 10 MG/ML
INJECTION, SOLUTION INTRAMUSCULAR; INTRAVENOUS
Status: COMPLETED | OUTPATIENT
Start: 2022-12-23 | End: 2022-12-23

## 2022-12-23 RX ORDER — CEFAZOLIN SODIUM 2 G/100ML
2 INJECTION, SOLUTION INTRAVENOUS ONCE
Status: COMPLETED | OUTPATIENT
Start: 2022-12-23 | End: 2022-12-23

## 2022-12-23 RX ORDER — BUPIVACAINE HYDROCHLORIDE 2.5 MG/ML
INJECTION, SOLUTION EPIDURAL; INFILTRATION; INTRACAUDAL
Status: DISCONTINUED | OUTPATIENT
Start: 2022-12-23 | End: 2022-12-23 | Stop reason: SURG

## 2022-12-23 RX ORDER — NICOTINE POLACRILEX 4 MG
15 LOZENGE BUCCAL
Status: DISCONTINUED | OUTPATIENT
Start: 2022-12-23 | End: 2022-12-25 | Stop reason: HOSPADM

## 2022-12-23 RX ORDER — SODIUM CHLORIDE 9 MG/ML
40 INJECTION, SOLUTION INTRAVENOUS AS NEEDED
Status: DISCONTINUED | OUTPATIENT
Start: 2022-12-23 | End: 2022-12-25 | Stop reason: HOSPADM

## 2022-12-23 RX ORDER — DEXAMETHASONE SODIUM PHOSPHATE 4 MG/ML
INJECTION, SOLUTION INTRA-ARTICULAR; INTRALESIONAL; INTRAMUSCULAR; INTRAVENOUS; SOFT TISSUE AS NEEDED
Status: DISCONTINUED | OUTPATIENT
Start: 2022-12-23 | End: 2022-12-23 | Stop reason: SURG

## 2022-12-23 RX ORDER — FENTANYL CITRATE 50 UG/ML
INJECTION, SOLUTION INTRAMUSCULAR; INTRAVENOUS AS NEEDED
Status: DISCONTINUED | OUTPATIENT
Start: 2022-12-23 | End: 2022-12-23 | Stop reason: SURG

## 2022-12-23 RX ORDER — DEXTROSE MONOHYDRATE 25 G/50ML
25 INJECTION, SOLUTION INTRAVENOUS
Status: DISCONTINUED | OUTPATIENT
Start: 2022-12-23 | End: 2022-12-25 | Stop reason: HOSPADM

## 2022-12-23 RX ORDER — HYDRALAZINE HYDROCHLORIDE 20 MG/ML
INJECTION INTRAMUSCULAR; INTRAVENOUS AS NEEDED
Status: DISCONTINUED | OUTPATIENT
Start: 2022-12-23 | End: 2022-12-23 | Stop reason: SURG

## 2022-12-23 RX ADMIN — ROCURONIUM BROMIDE 50 MG: 10 INJECTION, SOLUTION INTRAVENOUS at 07:34

## 2022-12-23 RX ADMIN — ROCURONIUM BROMIDE 20 MG: 10 INJECTION, SOLUTION INTRAVENOUS at 09:25

## 2022-12-23 RX ADMIN — DEXAMETHASONE SODIUM PHOSPHATE 4 MG: 4 INJECTION, SOLUTION INTRAMUSCULAR; INTRAVENOUS at 07:47

## 2022-12-23 RX ADMIN — CEFAZOLIN SODIUM 2 G: 2 INJECTION, SOLUTION INTRAVENOUS at 20:43

## 2022-12-23 RX ADMIN — PROPOFOL 150 MG: 10 INJECTION, EMULSION INTRAVENOUS at 07:34

## 2022-12-23 RX ADMIN — ROCURONIUM BROMIDE 30 MG: 10 INJECTION, SOLUTION INTRAVENOUS at 07:38

## 2022-12-23 RX ADMIN — HYDROCODONE BITARTRATE AND ACETAMINOPHEN 2 TABLET: 7.5; 325 TABLET ORAL at 23:37

## 2022-12-23 RX ADMIN — OXYCODONE HYDROCHLORIDE AND ACETAMINOPHEN 1 TABLET: 5; 325 TABLET ORAL at 20:42

## 2022-12-23 RX ADMIN — FENTANYL CITRATE 50 MCG: 50 INJECTION, SOLUTION INTRAMUSCULAR; INTRAVENOUS at 08:10

## 2022-12-23 RX ADMIN — LABETALOL HYDROCHLORIDE 5 MG: 5 INJECTION, SOLUTION INTRAVENOUS at 10:03

## 2022-12-23 RX ADMIN — INSULIN LISPRO 3 UNITS: 100 INJECTION, SOLUTION INTRAVENOUS; SUBCUTANEOUS at 18:41

## 2022-12-23 RX ADMIN — FAMOTIDINE 20 MG: 20 TABLET, FILM COATED ORAL at 07:07

## 2022-12-23 RX ADMIN — HYDRALAZINE HYDROCHLORIDE 5 MG: 20 INJECTION INTRAMUSCULAR; INTRAVENOUS at 08:19

## 2022-12-23 RX ADMIN — HYDROCODONE BITARTRATE AND ACETAMINOPHEN 2 TABLET: 7.5; 325 TABLET ORAL at 16:19

## 2022-12-23 RX ADMIN — ROCURONIUM BROMIDE 20 MG: 10 INJECTION, SOLUTION INTRAVENOUS at 10:01

## 2022-12-23 RX ADMIN — INSULIN LISPRO 6 UNITS: 100 INJECTION, SOLUTION INTRAVENOUS; SUBCUTANEOUS at 13:07

## 2022-12-23 RX ADMIN — LIDOCAINE HYDROCHLORIDE 50 MG: 10 INJECTION, SOLUTION EPIDURAL; INFILTRATION; INTRACAUDAL; PERINEURAL at 07:34

## 2022-12-23 RX ADMIN — DEXAMETHASONE SODIUM PHOSPHATE 4 MG: 10 INJECTION, SOLUTION INTRAMUSCULAR; INTRAVENOUS at 07:38

## 2022-12-23 RX ADMIN — KETOROLAC TROMETHAMINE 15 MG: 15 INJECTION, SOLUTION INTRAMUSCULAR; INTRAVENOUS at 14:15

## 2022-12-23 RX ADMIN — SODIUM CHLORIDE, POTASSIUM CHLORIDE, SODIUM LACTATE AND CALCIUM CHLORIDE: 600; 310; 30; 20 INJECTION, SOLUTION INTRAVENOUS at 10:13

## 2022-12-23 RX ADMIN — SODIUM CHLORIDE 100 ML/HR: 9 INJECTION, SOLUTION INTRAVENOUS at 16:13

## 2022-12-23 RX ADMIN — PREGABALIN 75 MG: 75 CAPSULE ORAL at 20:42

## 2022-12-23 RX ADMIN — ATORVASTATIN CALCIUM 20 MG: 20 TABLET, FILM COATED ORAL at 20:42

## 2022-12-23 RX ADMIN — CEFAZOLIN SODIUM 2 G: 2 INJECTION, SOLUTION INTRAVENOUS at 07:31

## 2022-12-23 RX ADMIN — EPHEDRINE SULFATE 10 MG: 50 INJECTION INTRAVENOUS at 07:40

## 2022-12-23 RX ADMIN — BUPIVACAINE HYDROCHLORIDE 60 ML: 2.5 INJECTION, SOLUTION EPIDURAL; INFILTRATION; INTRACAUDAL; PERINEURAL at 07:38

## 2022-12-23 RX ADMIN — SUGAMMADEX 200 MG: 100 INJECTION, SOLUTION INTRAVENOUS at 11:48

## 2022-12-23 RX ADMIN — BUPIVACAINE HYDROCHLORIDE 30 ML: 2.5 INJECTION, SOLUTION EPIDURAL; INFILTRATION; INTRACAUDAL at 12:25

## 2022-12-23 RX ADMIN — EPHEDRINE SULFATE: 50 INJECTION INTRAVENOUS at 12:02

## 2022-12-23 RX ADMIN — SODIUM CHLORIDE, POTASSIUM CHLORIDE, SODIUM LACTATE AND CALCIUM CHLORIDE 500 ML: 600; 310; 30; 20 INJECTION, SOLUTION INTRAVENOUS at 12:03

## 2022-12-23 RX ADMIN — SODIUM CHLORIDE, POTASSIUM CHLORIDE, SODIUM LACTATE AND CALCIUM CHLORIDE 9 ML/HR: 600; 310; 30; 20 INJECTION, SOLUTION INTRAVENOUS at 07:07

## 2022-12-23 RX ADMIN — HYDRALAZINE HYDROCHLORIDE 25 MG: 20 INJECTION INTRAMUSCULAR; INTRAVENOUS at 12:02

## 2022-12-23 RX ADMIN — FENTANYL CITRATE 50 MCG: 50 INJECTION, SOLUTION INTRAMUSCULAR; INTRAVENOUS at 07:34

## 2022-12-23 RX ADMIN — LABETALOL HYDROCHLORIDE 5 MG: 5 INJECTION, SOLUTION INTRAVENOUS at 08:52

## 2022-12-23 RX ADMIN — ALBUMIN (HUMAN): 12.5 INJECTION, SOLUTION INTRAVENOUS at 07:51

## 2022-12-23 RX ADMIN — LIDOCAINE HYDROCHLORIDE 0.5 ML: 10 INJECTION, SOLUTION EPIDURAL; INFILTRATION; INTRACAUDAL; PERINEURAL at 07:07

## 2022-12-23 RX ADMIN — HYDROMORPHONE HYDROCHLORIDE 0.5 MG: 1 INJECTION, SOLUTION INTRAMUSCULAR; INTRAVENOUS; SUBCUTANEOUS at 14:06

## 2022-12-23 RX ADMIN — CEFAZOLIN SODIUM 2 G: 2 INJECTION, SOLUTION INTRAVENOUS at 11:31

## 2022-12-23 RX ADMIN — ONDANSETRON 4 MG: 2 INJECTION INTRAMUSCULAR; INTRAVENOUS at 11:20

## 2022-12-23 NOTE — ANESTHESIA PROCEDURE NOTES
Bilateral Rectus Sheath block      Patient reassessed immediately prior to procedure    Patient location during procedure: post-op  Reason for block: at surgeon's request and post-op pain management  Performed by  CRNA/CAA: Yamini Belle CRNA  Assisted by: Fabián Suggs RN  Preanesthetic Checklist  Completed: patient identified, IV checked, site marked, risks and benefits discussed, surgical consent, monitors and equipment checked, pre-op evaluation and timeout performed  Prep:  Pt Position: supine  Sterile barriers:cap, gloves, mask and washed/disinfected hands  Prep: ChloraPrep  Patient monitoring: blood pressure monitoring, continuous pulse oximetry and EKG  Procedure    Sedation: yes  Performed under: local infiltration  Guidance:ultrasound guided  Images:still images obtained, printed/placed on chart    Laterality:Bilateral  Block Type:TAP  Injection Technique:single-shot  Needle Type:short-bevel and echogenic  Needle Gauge:20 G  Resistance on Injection: none    Medications Used: bupivacaine PF (MARCAINE) 0.25 % injection - Injection   30 mL - 12/23/2022 12:25:00 PM      Medications  Comment:Block Injection:  LA dose divided between Right and Left block        Post Assessment  Injection Assessment: negative aspiration for heme, incremental injection and no paresthesia on injection  Patient Tolerance:comfortable throughout block  Complications:no

## 2022-12-23 NOTE — ANESTHESIA PROCEDURE NOTES
Four Quadrant TAP blocks      Patient reassessed immediately prior to procedure    Patient location during procedure: OR  Reason for block: at surgeon's request and post-op pain management  Performed by  Anesthesiologist: Carmelo Chamorro MD  CRNA/CAA: Yamini Belle CRNA  Assisted by: Pattie Valle RN  Preanesthetic Checklist  Completed: patient identified, IV checked, site marked, risks and benefits discussed, surgical consent, monitors and equipment checked, pre-op evaluation and timeout performed  Prep:  Pt Position: supine  Sterile barriers:cap, gloves, mask and washed/disinfected hands  Prep: ChloraPrep  Patient monitoring: blood pressure monitoring, continuous pulse oximetry and EKG  Procedure    Sedation: yes  Performed under: general  Guidance:ultrasound guided  Images:still images obtained, printed/placed on chart    Laterality:Bilateral  Block Type:TAP  Injection Technique:single-shot  Needle Type:short-bevel and echogenic  Needle Gauge:20 G  Resistance on Injection: none    Medications Used: bupivacaine PF (MARCAINE) 0.25 % injection - Injection, Transversus Abdominus Plane   60 mL - 12/23/2022 7:38:00 AM  dexamethasone sodium phosphate injection - Injection, Transversus Abdominus Plane   4 mg - 12/23/2022 7:38:00 AM      Medications  Comment:Block Injection:  LA dose divided between four quadrant TAP blocks      Post Assessment  Injection Assessment: negative aspiration for heme, incremental injection and no paresthesia on injection  Patient Tolerance:comfortable throughout block  Complications:no  Additional Notes    Subcostal TAPs    A high-frequency linear transducer, with sterile cover, was placed sub-xiphoid to identify Linea Alba, right and left Rectus Abdominus Muscles (ILONEL). The transducer was moved either right or left subcostally to identify the LIONEL and the Transverse Abdominus Muscle (PATEL). The insertion site was prepped in sterile fashion and then localized with 2-5 ml of 1%  "Lidocaine. Using ultrasound-guidance, a 20-gauge B-Allen 4\" Ultraplex 360 non-stimulating echogenic needle was advanced in plane, from medial to lateral, until the tip of the needle was in the fascial plane between the LIONEL and PATEL. 1-3ml of preservative free normal saline was used to hydro-dissect the fascial planes. After the fascial plane was verified, the local anesthetic (LA) was injected. The procedure was repeated on the opposite side for bilateral coverage. Aspiration every 5 ml to prevent intravascular injection. Injection was completed with negative aspiration of blood and negative intravascular injection. Injection pressures were normal with minimal resistance. The subcostal approach to the TAP nerve block ideally anesthetizes the intercostal nerves T6-T9.     Mid-Axillary/Lateral TAPs    A high-frequency linear transducer, with sterile cover, was placed in the midaxillary line between the ASIS and costal margin. The External Oblique Muscle (EOM), Internal Oblique Muscle (IOM), Transverse Abdominus Muscle (PATEL), and Peritoneum were identified. The insertion site was prepped in sterile fashion and then localized with 2-5 ml of 1% Lidocaine. Using ultrasound-guidance, a 20-gauge B-Allen 4\" Ultraplex 360 non-stimulating echogenic needle was advanced in plane, from medial to lateral, until the tip of the needle was in the fascial plane between the IOM and PATEL. 1-3ml of preservative free normal saline was used to hydro-dissect the fascial planes. After the fascial plane was verified, the local anesthetic (LA) was injected. The procedure was repeated on the opposite side for bilateral coverage. Aspiration every 5 ml to prevent intravascular injection. Injection was completed with negative aspiration of blood and negative intravascular injection. Injection pressures were normal with minimal resistance. Midaxillary TAPs should reach intercostal nerves T10- T11 and the subcostal nerve T12.            "
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
## 2022-12-23 NOTE — PLAN OF CARE
Goal Outcome Evaluation:           Progress: no change  Outcome Evaluation: Patient arrived from PACU. in 8/10 pain.

## 2022-12-23 NOTE — ANESTHESIA PROCEDURE NOTES
Airway  Urgency: elective    Date/Time: 12/23/2022 7:37 AM  Airway not difficult    General Information and Staff    Patient location during procedure: OR  CRNA/CAA: Yamini Belle CRNA    Indications and Patient Condition  Indications for airway management: airway protection    Preoxygenated: yes  MILS maintained throughout  Mask difficulty assessment: 2 - vent by mask + OA or adjuvant +/- NMBA    Final Airway Details  Final airway type: endotracheal airway      Successful airway: ETT  Cuffed: yes   Successful intubation technique: direct laryngoscopy  Endotracheal tube insertion site: oral  Blade: Ventura  Blade size: 2  ETT size (mm): 7.5  Cormack-Lehane Classification: grade I - full view of glottis  Placement verified by: chest auscultation and capnometry   Cuff volume (mL): 6  Measured from: lips  ETT/EBT  to lips (cm): 23  Number of attempts at approach: 1  Assessment: lips, teeth, and gum same as pre-op and atraumatic intubation    Additional Comments  Pt to OR 18. Pt moved self to OR table. ASA monitors placed. Pre-O2 with 100% Oxygen. SIVI. Atraumatic intubation. +ETCO2, +BBS.
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
## 2022-12-23 NOTE — ANESTHESIA POSTPROCEDURE EVALUATION
Patient: Cj Cifuentes    Procedure Summary     Date: 12/23/22 Room / Location: [facility] OR  /  [facility]    Anesthesia Start: 0727 Anesthesia Stop:     Procedure: PROSTATECTOMY LAPAROSCOPIC SIMPLE WITH DAVINCI ROBOT (Abdomen) Diagnosis:       Urinary retention      (Urinary retention [R33.9])    Surgeons: Tuyet Stinson MD Provider: Carmelo Chamorro MD    Anesthesia Type: general ASA Status: 3          Anesthesia Type: general    Vitals  Vitals Value Taken Time   BP 92/53 12/23/22 1159   Temp 97.9 °F (36.6 °C) 12/23/22 1159   Pulse 61 12/23/22 1159   Resp 12 12/23/22 1159   SpO2 94 % 12/23/22 1159           Post Anesthesia Care and Evaluation    Patient location during evaluation: PACU  Patient participation: complete - patient participated  Level of consciousness: awake and alert  Pain score: 0  Pain management: adequate    Airway patency: patent  Anesthetic complications: No anesthetic complications  PONV Status: none  Cardiovascular status: hemodynamically stable and acceptable  Respiratory status: nonlabored ventilation, acceptable and nasal cannula  Hydration status: acceptable    Comments: Pt transferred to PACU with O2. Vital signs stable. Report to PACU RN and care accepted.

## 2022-12-23 NOTE — OP NOTE
PROSTATECTOMY LAPAROSCOPIC WITH DAVINCI ROBOT  Procedure Note    Cj Cifuentes  12/23/2022    Pre-op Diagnosis:   Urinary retention [R33.9]    Post-op Diagnosis:     Post-Op Diagnosis Codes:     * Urinary retention [R33.9]    Procedure/CPT® Codes:      Procedure(s):  PROSTATECTOMY LAPAROSCOPIC SIMPLE WITH DAVINCI ROBOT    Surgeon(s):  Tuyet Stinson MD Stark, Timothy, MD    Anesthesia: see anesthesia record    Staff:   Circulator: Valeria You RN; Pattie Valle RN  Scrub Person: Valeria Leiva; Alyssa Cordova    Estimated Blood Loss: 350 mL  Urine Voided: * No values recorded between 12/23/2022  7:27 AM and 12/23/2022 11:54 AM *    Specimens:                ID Type Source Tests Collected by Time   A (Not marked as sent) :  Tissue Prostate TISSUE PATHOLOGY EXAM Tuyet Stinson MD 12/23/2022 0834         Drains: 22f 3-way carbone catheter and 10 flat ARSALAN drain    Findings:   1.  Umbilical hernia reduced  2.  Extremely large prostate excised  3.  Good hemostasis at end of case  4.  Clear urine on CBI     Blood: N/A    Complications: None immediate     Indication for Procedure: Mr. Cifuentes is a 72-year-old gentleman who is here for robot-assisted laparoscopic prostatectomy.  He was found to be in urinary retention and has failed multiple trials of void.  He was found to have a greater than 100 g prostate and after discussing the options with him he has elected to move forward with simple prostatectomy.    Description of Procedure: The patient was seen and examined in the preoperative area.  The risks, benefits, and alternatives were explained to the patient and family.  Informed consent was obtained.  The patient was then taken back to the operating theater and placed on the table in a supine position.  Venodyne boots were placed on the bilateral lower extremities.  General anesthesia was induced without any complication.  They were then placed in the dorsal lithotomy position with all pressure points padded and  secured.  The patient was prepped and draped in the usual sterile fashion and a timeout was taken.      An 18 f carbone catheter was placed into the bladder.  We began by obtaining access into the abdomen using a Veress needle.  The abdomen was insufflated to 15 mmHg and an 8 Indonesian midline port was placed.  Of note, he had an umbilical hernia that was avoided by placing the port superior to the hernia.  We then placed a 8 mm robot port on the left side and a 12 mm assistant port 1 handbreadth lateral to this on the left side.  Once these were in place the hernia was reduced.  We then placed two 8 mm ports on the patients right side.  A 5 mm port as placed in the left upper quadrant.  The robot was then docked.  We began by filling his bladder with approximately 200 cc of sterile solution.  The bladder was then opened using cautery and sharp dissection.  Once the bladder was open we used Edison needles and 2-0 PDS held at the skin to hold the bladder flaps open.  The prostate was then marked circumferentially and then we began our dissection until the capsule of the prostate was identified.  This dissection was carried all the way around until it was noted that the prostate was coming in.  Once this was accomplished the prostate was excised.  Unfortunately his prostate was very nodular and it was removed in multiple pieces.  Once the prostate was excised several areas of significant bleeding were noted and this was controlled with pressure and bipolar electrocautery.  Using 3-0 V-Lock stitch the posterior bladder mucosa was tacked down to the urethra.  Once this was accomplished Surgiflo was placed behind the bladder flap.  At this point a new 22 Indonesian three-way Carbone catheter was introduced into the bladder.  We then closed the bladder using 2 OV lock stitches.  The catheter was filled with 30 cc and started on CBI.  A 10 flat ARSALAN drain was introduced through one of the robot ports.  The 12 mm assistant port was  closed using a 0 Vicryl on a UR 6.  We then brought the bag out of the midline port site.  The specimen was then handed off.  The midline incision was closed using 0 PDS.  Skin was closed using 4-0 Monocryl.  The patient tolerated the procedure well and there were no immediate complications to the procedure.  He was taken the PACU in stable extubated condition.    Disposition: The patient will be transferred to the floor when PACU criteria is met.  The intraoperative findings and postoperative plans were discussed with the patient and family.     Tuyet Stinson MD     Date: 12/23/2022  Time: 12:18 EST
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
## 2022-12-24 LAB
ANION GAP SERPL CALCULATED.3IONS-SCNC: 10 MMOL/L (ref 5–15)
BUN SERPL-MCNC: 11 MG/DL (ref 8–23)
BUN/CREAT SERPL: 14.3 (ref 7–25)
CALCIUM SPEC-SCNC: 8.6 MG/DL (ref 8.6–10.5)
CHLORIDE SERPL-SCNC: 103 MMOL/L (ref 98–107)
CO2 SERPL-SCNC: 24 MMOL/L (ref 22–29)
CREAT SERPL-MCNC: 0.77 MG/DL (ref 0.76–1.27)
DEPRECATED RDW RBC AUTO: 47.5 FL (ref 37–54)
EGFRCR SERPLBLD CKD-EPI 2021: 96.3 ML/MIN/1.73
ERYTHROCYTE [DISTWIDTH] IN BLOOD BY AUTOMATED COUNT: 14.2 % (ref 12.3–15.4)
GLUCOSE BLDC GLUCOMTR-MCNC: 142 MG/DL (ref 70–130)
GLUCOSE BLDC GLUCOMTR-MCNC: 143 MG/DL (ref 70–130)
GLUCOSE BLDC GLUCOMTR-MCNC: 181 MG/DL (ref 70–130)
GLUCOSE SERPL-MCNC: 129 MG/DL (ref 65–99)
HCT VFR BLD AUTO: 35 % (ref 37.5–51)
HGB BLD-MCNC: 11.7 G/DL (ref 13–17.7)
MCH RBC QN AUTO: 30.3 PG (ref 26.6–33)
MCHC RBC AUTO-ENTMCNC: 33.4 G/DL (ref 31.5–35.7)
MCV RBC AUTO: 90.7 FL (ref 79–97)
PLATELET # BLD AUTO: 178 10*3/MM3 (ref 140–450)
PMV BLD AUTO: 10.6 FL (ref 6–12)
POTASSIUM SERPL-SCNC: 4.6 MMOL/L (ref 3.5–5.2)
RBC # BLD AUTO: 3.86 10*6/MM3 (ref 4.14–5.8)
SODIUM SERPL-SCNC: 137 MMOL/L (ref 136–145)
TSH SERPL DL<=0.05 MIU/L-ACNC: 6.84 UIU/ML (ref 0.27–4.2)
WBC NRBC COR # BLD: 12.48 10*3/MM3 (ref 3.4–10.8)

## 2022-12-24 PROCEDURE — 80048 BASIC METABOLIC PNL TOTAL CA: CPT | Performed by: UROLOGY

## 2022-12-24 PROCEDURE — 63710000001 INSULIN LISPRO (HUMAN) PER 5 UNITS: Performed by: INTERNAL MEDICINE

## 2022-12-24 PROCEDURE — 63710000001 TAMSULOSIN 0.4 MG CAPSULE: Performed by: UROLOGY

## 2022-12-24 PROCEDURE — A9270 NON-COVERED ITEM OR SERVICE: HCPCS | Performed by: UROLOGY

## 2022-12-24 PROCEDURE — 84443 ASSAY THYROID STIM HORMONE: CPT | Performed by: NURSE PRACTITIONER

## 2022-12-24 PROCEDURE — A9270 NON-COVERED ITEM OR SERVICE: HCPCS | Performed by: INTERNAL MEDICINE

## 2022-12-24 PROCEDURE — 99024 POSTOP FOLLOW-UP VISIT: CPT | Performed by: UROLOGY

## 2022-12-24 PROCEDURE — 85027 COMPLETE CBC AUTOMATED: CPT | Performed by: UROLOGY

## 2022-12-24 PROCEDURE — 63710000001 HYDROCODONE-ACETAMINOPHEN 7.5-325 MG TABLET: Performed by: UROLOGY

## 2022-12-24 PROCEDURE — 82962 GLUCOSE BLOOD TEST: CPT

## 2022-12-24 PROCEDURE — 63710000001 ATORVASTATIN 20 MG TABLET: Performed by: UROLOGY

## 2022-12-24 PROCEDURE — 25010000002 HEPARIN (PORCINE) PER 1000 UNITS: Performed by: UROLOGY

## 2022-12-24 PROCEDURE — 25010000002 CEFAZOLIN IN DEXTROSE 2-4 GM/100ML-% SOLUTION: Performed by: UROLOGY

## 2022-12-24 PROCEDURE — 63710000001 PREGABALIN 75 MG CAPSULE: Performed by: UROLOGY

## 2022-12-24 RX ADMIN — SODIUM CHLORIDE 75 ML/HR: 9 INJECTION, SOLUTION INTRAVENOUS at 13:13

## 2022-12-24 RX ADMIN — SODIUM CHLORIDE 100 ML/HR: 9 INJECTION, SOLUTION INTRAVENOUS at 02:15

## 2022-12-24 RX ADMIN — ATORVASTATIN CALCIUM 20 MG: 20 TABLET, FILM COATED ORAL at 20:46

## 2022-12-24 RX ADMIN — HEPARIN SODIUM 5000 UNITS: 5000 INJECTION INTRAVENOUS; SUBCUTANEOUS at 21:41

## 2022-12-24 RX ADMIN — INSULIN LISPRO 2 UNITS: 100 INJECTION, SOLUTION INTRAVENOUS; SUBCUTANEOUS at 11:30

## 2022-12-24 RX ADMIN — HYDROCODONE BITARTRATE AND ACETAMINOPHEN 2 TABLET: 7.5; 325 TABLET ORAL at 17:16

## 2022-12-24 RX ADMIN — HYDROCODONE BITARTRATE AND ACETAMINOPHEN 2 TABLET: 7.5; 325 TABLET ORAL at 11:38

## 2022-12-24 RX ADMIN — Medication 10 ML: at 20:46

## 2022-12-24 RX ADMIN — HEPARIN SODIUM 5000 UNITS: 5000 INJECTION INTRAVENOUS; SUBCUTANEOUS at 15:51

## 2022-12-24 RX ADMIN — CEFAZOLIN SODIUM 2 G: 2 INJECTION, SOLUTION INTRAVENOUS at 04:52

## 2022-12-24 RX ADMIN — HYDROCODONE BITARTRATE AND ACETAMINOPHEN 2 TABLET: 7.5; 325 TABLET ORAL at 04:58

## 2022-12-24 RX ADMIN — Medication 10 ML: at 09:16

## 2022-12-24 RX ADMIN — TAMSULOSIN HYDROCHLORIDE 0.4 MG: 0.4 CAPSULE ORAL at 09:15

## 2022-12-24 RX ADMIN — PREGABALIN 75 MG: 75 CAPSULE ORAL at 09:16

## 2022-12-24 RX ADMIN — PREGABALIN 75 MG: 75 CAPSULE ORAL at 20:46

## 2022-12-24 RX ADMIN — HEPARIN SODIUM 5000 UNITS: 5000 INJECTION INTRAVENOUS; SUBCUTANEOUS at 04:52

## 2022-12-24 RX ADMIN — HYDROCODONE BITARTRATE AND ACETAMINOPHEN 2 TABLET: 7.5; 325 TABLET ORAL at 22:59

## 2022-12-24 NOTE — PROGRESS NOTES
[facility]   Urology Progress Note    Patient Name: Cj Cifuentes  : 1950  MRN: [id number]  Date of admission: 2022  Surgical Procedures Since Admission:  Procedure(s):  PROSTATECTOMY LAPAROSCOPIC SIMPLE WITH DAVINCI ROBOT  Surgeon:  Tuyet Stinson MD  Status:  1 Day Post-Op  -------------------    Subjective   Subjective     Chief Complaint: Urinary retention    History of Present Illness   Reports he is overall doing well.  Walking and passing gas.  Pain well controlled.  Complains of pain at his belly button.  Carbone with clear urine off CBI    Overnight, his carbone became kinked with CBI running.  He complained of discomfort and had bladder spasm with leakage around the catheter.  Bladder scan showed 600 cc in the bladder. Since then the ARSALAN has put out around 150 cc of serosanguinous fluid.       Objective   Objective     Vitals:   Temp:  [97.2 °F (36.2 °C)-98.3 °F (36.8 °C)] 98.3 °F (36.8 °C)  Heart Rate:  [55-93] 74  Resp:  [12-18] 18  BP: ()/(29-79) 130/78  Flow (L/min):  [3-4] 3  Output by Drain (mL) 22 0701 - 22 1900 22 1901 - 22 0700 22 0701 - 22 0948 Range Total   Closed/Suction Drain 1 Abdomen   150 150   Continuous Bladder Irrigation 22 Fr 0 -2950  -2950       Physical Exam  Vitals and nursing note reviewed.   Constitutional:       General: He is awake. He is not in acute distress.     Appearance: Normal appearance. He is well-developed. He is obese.   HENT:      Head: Normocephalic and atraumatic.      Right Ear: External ear normal.      Left Ear: External ear normal.      Nose: Nose normal.   Eyes:      Conjunctiva/sclera: Conjunctivae normal.   Pulmonary:      Effort: Pulmonary effort is normal.   Abdominal:      General: There is no distension.      Palpations: Abdomen is soft. There is no mass.      Tenderness: There is no abdominal tenderness. There is no right CVA tenderness, left CVA tenderness, guarding or rebound.      Hernia: No  hernia is present. There is no hernia in the left inguinal area or right inguinal area.   Genitourinary:     Pubic Area: No rash.       Rectum: No mass or tenderness. Normal anal tone.   Musculoskeletal:      Cervical back: Normal range of motion.   Lymphadenopathy:      Lower Body: No right inguinal adenopathy. No left inguinal adenopathy.   Skin:     General: Skin is warm.   Neurological:      General: No focal deficit present.      Mental Status: He is alert and oriented to person, place, and time.   Psychiatric:         Behavior: Behavior normal. Behavior is cooperative.                Result Review    Result Review:  I have personally reviewed the results from the time of this admission to 12/24/2022 09:48 EST and agree with these findings:  []  Laboratory  []  Microbiology  []  Radiology  []  EKG/Telemetry   []  Cardiology/Vascular   []  Pathology  []  Old records  []  Other:  Most notable findings include:     Assessment & Plan   Assessment / Plan     Brief Patient Summary:  Cj Cifuentes is a 72 y.o. male who is s/p robot assisted laparoscopic simple prostatectomy    Active Hospital Problems:  Active Hospital Problems    Diagnosis    • **Urinary retention due to benign prostatic hyperplasia      Plan:   1.  Will keep today to assess ARSALAN output.  If ARSALAN output elevated will plan on fluid for Cr tomorrow.  2.  Cont to encourage PO intake  3.  OOB ad gaye.    4.  Venodynes and DVT ppx  5.  Catheter will stay in for 10 days.   6.  Appreciate Hospitalist support

## 2022-12-24 NOTE — PROGRESS NOTES
[facility]  CLINICAL REVIEW NOTE    Patient Name: Cj Cifuentes  : 1950  MRN: [id number]    Date of Admission: 2022  Length of Stay: 0  Attending Service:  Urology         Patient's labs, charting, and events reviewed.  No active medical management issues to address today, the Hospitalist team will continue to follow daily, be available for any needs, and see or bill for services as needed.    I will be present on floor most of today, RN aware she can address any patient concerns with me as needed.     Electronically signed by Katiuska Aranda MD, 22, 12:04 PM EST.

## 2022-12-24 NOTE — PLAN OF CARE
Goal Outcome Evaluation:           Progress: improving  Outcome Evaluation: Patient's pain has been under control today. CBI turned off at 715 this morning and urine has been adequate and light pink all day. ARSALAN drain output decreased since this mornig. Patinet walking, eating and passing flatus. If all goes well carlton Stinson will be in early tomorrow to d/c patient.

## 2022-12-24 NOTE — PLAN OF CARE
Goal Outcome Evaluation:           Progress: improving  Outcome Evaluation: VSS on RA. 2 complaints of pain, relieved by PRN medications. No complaints of nausea. ARSALAN in place. Mayes & CBI in place with light red/pink output. Patient is ambulating in the hallways. Patient was awake most of the night. No concerns at this time.

## 2022-12-25 VITALS
HEART RATE: 75 BPM | SYSTOLIC BLOOD PRESSURE: 152 MMHG | DIASTOLIC BLOOD PRESSURE: 80 MMHG | OXYGEN SATURATION: 93 % | HEIGHT: 69 IN | RESPIRATION RATE: 18 BRPM | BODY MASS INDEX: 36.88 KG/M2 | TEMPERATURE: 98.6 F | WEIGHT: 249 LBS

## 2022-12-25 LAB
ANION GAP SERPL CALCULATED.3IONS-SCNC: 10 MMOL/L (ref 5–15)
BUN SERPL-MCNC: 9 MG/DL (ref 8–23)
BUN/CREAT SERPL: 12 (ref 7–25)
CALCIUM SPEC-SCNC: 8.7 MG/DL (ref 8.6–10.5)
CHLORIDE SERPL-SCNC: 102 MMOL/L (ref 98–107)
CO2 SERPL-SCNC: 24 MMOL/L (ref 22–29)
CREAT FLD-MCNC: 0.77 MG/DL
CREAT SERPL-MCNC: 0.75 MG/DL (ref 0.76–1.27)
DEPRECATED RDW RBC AUTO: 44.4 FL (ref 37–54)
EGFRCR SERPLBLD CKD-EPI 2021: 97.1 ML/MIN/1.73
ERYTHROCYTE [DISTWIDTH] IN BLOOD BY AUTOMATED COUNT: 13.8 % (ref 12.3–15.4)
GLUCOSE BLDC GLUCOMTR-MCNC: 110 MG/DL (ref 70–130)
GLUCOSE BLDC GLUCOMTR-MCNC: 123 MG/DL (ref 70–130)
GLUCOSE SERPL-MCNC: 123 MG/DL (ref 65–99)
HCT VFR BLD AUTO: 32.2 % (ref 37.5–51)
HGB BLD-MCNC: 11 G/DL (ref 13–17.7)
MCH RBC QN AUTO: 29.9 PG (ref 26.6–33)
MCHC RBC AUTO-ENTMCNC: 34.2 G/DL (ref 31.5–35.7)
MCV RBC AUTO: 87.5 FL (ref 79–97)
PLATELET # BLD AUTO: 194 10*3/MM3 (ref 140–450)
PMV BLD AUTO: 10 FL (ref 6–12)
POTASSIUM SERPL-SCNC: 4.2 MMOL/L (ref 3.5–5.2)
RBC # BLD AUTO: 3.68 10*6/MM3 (ref 4.14–5.8)
SODIUM SERPL-SCNC: 136 MMOL/L (ref 136–145)
T4 FREE SERPL-MCNC: 0.92 NG/DL (ref 0.93–1.7)
WBC NRBC COR # BLD: 6.7 10*3/MM3 (ref 3.4–10.8)

## 2022-12-25 PROCEDURE — A9270 NON-COVERED ITEM OR SERVICE: HCPCS | Performed by: UROLOGY

## 2022-12-25 PROCEDURE — 82570 ASSAY OF URINE CREATININE: CPT | Performed by: UROLOGY

## 2022-12-25 PROCEDURE — 85027 COMPLETE CBC AUTOMATED: CPT | Performed by: UROLOGY

## 2022-12-25 PROCEDURE — 63710000001 TAMSULOSIN 0.4 MG CAPSULE: Performed by: UROLOGY

## 2022-12-25 PROCEDURE — 63710000001 PREGABALIN 75 MG CAPSULE: Performed by: UROLOGY

## 2022-12-25 PROCEDURE — 84439 ASSAY OF FREE THYROXINE: CPT | Performed by: INTERNAL MEDICINE

## 2022-12-25 PROCEDURE — 99024 POSTOP FOLLOW-UP VISIT: CPT | Performed by: UROLOGY

## 2022-12-25 PROCEDURE — 80048 BASIC METABOLIC PNL TOTAL CA: CPT | Performed by: UROLOGY

## 2022-12-25 PROCEDURE — 82962 GLUCOSE BLOOD TEST: CPT

## 2022-12-25 PROCEDURE — 25010000002 HEPARIN (PORCINE) PER 1000 UNITS: Performed by: UROLOGY

## 2022-12-25 PROCEDURE — 99213 OFFICE O/P EST LOW 20 MIN: CPT | Performed by: INTERNAL MEDICINE

## 2022-12-25 PROCEDURE — 63710000001 HYDROCODONE-ACETAMINOPHEN 7.5-325 MG TABLET: Performed by: UROLOGY

## 2022-12-25 RX ORDER — CYCLOBENZAPRINE HCL 5 MG
5 TABLET ORAL 3 TIMES DAILY PRN
Qty: 20 TABLET | Refills: 0 | Status: SHIPPED | OUTPATIENT
Start: 2022-12-25 | End: 2023-03-13

## 2022-12-25 RX ORDER — OXYBUTYNIN CHLORIDE 5 MG/1
5 TABLET ORAL EVERY 8 HOURS PRN
Qty: 20 TABLET | Refills: 0 | Status: SHIPPED | OUTPATIENT
Start: 2022-12-25 | End: 2023-03-13

## 2022-12-25 RX ORDER — PHENAZOPYRIDINE HYDROCHLORIDE 200 MG/1
200 TABLET, FILM COATED ORAL 3 TIMES DAILY PRN
Qty: 20 TABLET | Refills: 0 | Status: SHIPPED | OUTPATIENT
Start: 2022-12-25 | End: 2023-01-13

## 2022-12-25 RX ORDER — DOCUSATE SODIUM 100 MG/1
100 CAPSULE, LIQUID FILLED ORAL 2 TIMES DAILY
Qty: 30 CAPSULE | Refills: 1 | Status: SHIPPED | OUTPATIENT
Start: 2022-12-25 | End: 2023-01-09 | Stop reason: HOSPADM

## 2022-12-25 RX ORDER — CEPHALEXIN 500 MG/1
500 CAPSULE ORAL 2 TIMES DAILY
Qty: 20 CAPSULE | Refills: 0 | Status: SHIPPED | OUTPATIENT
Start: 2022-12-25 | End: 2023-01-09 | Stop reason: HOSPADM

## 2022-12-25 RX ORDER — OXYCODONE HYDROCHLORIDE AND ACETAMINOPHEN 5; 325 MG/1; MG/1
1-2 TABLET ORAL EVERY 6 HOURS PRN
Qty: 20 TABLET | Refills: 0 | Status: ON HOLD | OUTPATIENT
Start: 2022-12-25 | End: 2023-01-09 | Stop reason: SDUPTHER

## 2022-12-25 RX ADMIN — HEPARIN SODIUM 5000 UNITS: 5000 INJECTION INTRAVENOUS; SUBCUTANEOUS at 05:14

## 2022-12-25 RX ADMIN — PREGABALIN 75 MG: 75 CAPSULE ORAL at 09:06

## 2022-12-25 RX ADMIN — SODIUM CHLORIDE 75 ML/HR: 9 INJECTION, SOLUTION INTRAVENOUS at 01:53

## 2022-12-25 RX ADMIN — Medication 10 ML: at 09:07

## 2022-12-25 RX ADMIN — HYDROCODONE BITARTRATE AND ACETAMINOPHEN 2 TABLET: 7.5; 325 TABLET ORAL at 13:41

## 2022-12-25 RX ADMIN — TAMSULOSIN HYDROCHLORIDE 0.4 MG: 0.4 CAPSULE ORAL at 09:06

## 2022-12-25 NOTE — PLAN OF CARE
Goal Outcome Evaluation:              Outcome Evaluation: patient alert and oriented, denies pain, IS 2000, walks with stand by assist waiting on lab results eager for discharge

## 2022-12-25 NOTE — PROGRESS NOTES
[facility] Medicine Services  PROGRESS NOTE    Patient Name: Cj Cifuentes  : 1950  MRN: [id number]    Date of Admission: 2022  Primary Care Physician: Gary Kaur MD    Subjective   Subjective     CC:  postop    HPI:  Doing well, has been up walking w a walker, is passing gas    ROS:  Gen- No fevers, chills  CV- No chest pain, palpitations  Resp- No cough, dyspnea  GI- No N/V/D, abd pain        Objective   Objective     Vital Signs:   Temp:  [98.1 °F (36.7 °C)-98.6 °F (37 °C)] 98.1 °F (36.7 °C)  Heart Rate:  [75-92] 75  Resp:  [18] 18  BP: (124-140)/(71-80) 130/80  Flow (L/min):  [2] 2     Physical Exam:  Constitutional: Awake, alert in bed  Eyes: PER  no conjunctival injection  HENT: NCAT, mucous membranes moist  Neck: Supple, n, trachea midline  Respiratory: Clear to auscultation bilaterally, nonlabored respirations  On nc oxygen   Cardiovascular: RRR, no murmurs  Gastrointestinal: Positive bowel sounds, soft, nontender, mildly distended, incision at umbilicus sl tender, ARSALAN drain noted RLQ  Musculoskeletal: No bilateral ankle edema, no clubbing or cyanosis to extremities  Psychiatric: Appropriate affect, cooperative  Neurologic: Oriented x 3, strength symmetric in all extremities, Cranial Nerves grossly intact to confrontation, speech clear  Skin: No rashes    Results Reviewed:  LAB RESULTS:      Lab 22  0437 22  1006 22  1227 22  1154   WBC 6.70 12.48* 11.29* 9.45   HEMOGLOBIN 11.0* 11.7* 12.0* 14.8   HEMATOCRIT 32.2* 35.0* 33.5* 43.5   PLATELETS 194 178 203 261   MCV 87.5 90.7 84.0 87.5   PROTIME  --   --   --  13.4         Lab 22  0437 22  1501 22  1227 22  1333 22  1154   SODIUM 136 137 135*  --  137   POTASSIUM 4.2 4.6 4.4  --  4.0   CHLORIDE 102 103 102  --  100   CO2 24.0 24.0 19.0*  --  27.0   ANION GAP 10.0 10.0 14.0  --  10.0   BUN 9 11 12  --  8   CREATININE 0.75* 0.77 0.89  --  0.76   EGFR 97.1  96.3 92.2  --  96.7   GLUCOSE 123* 129* 277*  --  219*   CALCIUM 8.7 8.6 8.5*  --  9.7   HEMOGLOBIN A1C  --   --   --  7.00*  --    TSH  --  6.840*  --   --   --              Lab 12/19/22  1154   PROTIME 13.4   INR 1.03                 Brief Urine Lab Results  (Last result in the past 365 days)      Color   Clarity   Blood   Leuk Est   Nitrite   Protein   CREAT   Urine HCG        12/19/22 1154 Yellow   Cloudy   Moderate (2+)   Moderate (2+)   Negative   100 mg/dL (2+)                 Microbiology Results Abnormal     None          Bilateral Rectus Sheath block    Result Date: 12/23/2022  Yamini Belle CRNA     12/23/2022 12:27 PM Bilateral Rectus Sheath block Patient reassessed immediately prior to procedure Patient location during procedure: post-op Reason for block: at surgeon's request and post-op pain management Performed by TAHMINA/CAA: Yamini Belle CRNA Assisted by: Fabián Suggs RN Preanesthetic Checklist Completed: patient identified, IV checked, site marked, risks and benefits discussed, surgical consent, monitors and equipment checked, pre-op evaluation and timeout performed Prep: Pt Position: supine Sterile barriers:cap, gloves, mask and washed/disinfected hands Prep: ChloraPrep Patient monitoring: blood pressure monitoring, continuous pulse oximetry and EKG Procedure Sedation: yes Performed under: local infiltration Guidance:ultrasound guided Images:still images obtained, printed/placed on chart Laterality:Bilateral Block Type:TAP Injection Technique:single-shot Needle Type:short-bevel and echogenic Needle Gauge:20 G Resistance on Injection: none Medications Used: bupivacaine PF (MARCAINE) 0.25 % injection - Injection  30 mL - 12/23/2022 12:25:00 PM Medications Comment:Block Injection:  LA dose divided between Right and Left block Post Assessment Injection Assessment: negative aspiration for heme, incremental injection and no paresthesia on injection Patient Tolerance:comfortable throughout  block Complications:no           I have reviewed the medications:  Scheduled Meds:atorvastatin, 20 mg, Oral, Nightly  heparin (porcine), 5,000 Units, Subcutaneous, Q8H  insulin lispro, 0-7 Units, Subcutaneous, TID AC  pregabalin, 75 mg, Oral, BID  sodium chloride, 10 mL, Intravenous, Q12H  tamsulosin, 0.4 mg, Oral, Daily      Continuous Infusions:sodium chloride, 75 mL/hr, Last Rate: 75 mL/hr (12/25/22 0609)      PRN Meds:.•  acetaminophen **OR** acetaminophen  •  dextrose  •  dextrose  •  docusate sodium  •  glucagon (human recombinant)  •  HYDROcodone-acetaminophen  •  HYDROmorphone **AND** naloxone  •  ketorolac  •  oxyCODONE-acetaminophen  •  sodium chloride  •  sodium chloride    Assessment & Plan   Assessment & Plan     Active Hospital Problems    Diagnosis  POA   • **Urinary retention due to benign prostatic hyperplasia [N40.1, R33.8]  Unknown      Resolved Hospital Problems   No resolved problems to display.        Brief Hospital Course to date:  Cj Cifuentes is a 72 y.o. male  with past medical history significant for BPH, urinary retention, T2DM, hypothyroidism, GERD, HLD, HTN, and LORIE who underwent laparoscopic prostatectomy with Davinci robot on 12/23/2022 by urologist Dr. Stinson. Hospital medicine was consulted to assist with post-operative medical management.      Urinary retention  BPH  -s/p laparoscopic prostatectomy with Davinci robot on 12/23/2022 by urologist Dr. Stinson  -management per primary   -Mayes cath - CBI now off.  ARSALAN drain RLQ   -continue Flomax  -Norco, Percocet, Dilaudid PRN for pain      Leukocytosis  -WBC 11.29 on presentation; resolved ; pt afebrile   -Recently followed by ID d/t prostatitis with group B streptococcus         T2DM  -Hgb A1C 7.0 on 12/19/2022.  Controlled on SSI.       Hypothyroidism  -TSH mildly elevated and pt not on Synthroid (stopped by PCP recently).   - PCP to recheck in 3 month.   Add Free t4      Severe DDD  -Followed by Dr. Brennan   - has a pain clinic  appointment     DVT prophylaxis:  Medical and mechanical DVT prophylaxis orders are present.     AM-PAC 6 Clicks Score (PT): 22 (12/24/22 2000)    CODE STATUS:   Code Status and Medical Interventions:   Ordered at: 12/23/22 1218     Code Status (Patient has no pulse and is not breathing):    CPR (Attempt to Resuscitate)     Medical Interventions (Patient has pulse or is breathing):    Full Support     Release to patient:    Routine Release       Keena Tamez MD  12/25/22

## 2022-12-25 NOTE — DISCHARGE SUMMARY
Inpatient Discharge Summary      Patient Name: Cj Cifuentes  : 1950   MRN: [id number]   Admission Date: 2022  5:47 AM   Date of Discharge: 2022     Discharge Diagnosis: Robot assisted laparoscopic simple prostatectomy    Care Team: Patient Care Team:  Gary Kaur MD as PCP - General (Family Medicine)  Enrique Madrigal MD as Consulting Physician (Pain Medicine)    History of Present Illness: Cj Cifuentes is a 72 y.o. male who is being discharged today s/p robot assisted simple prostatectomy    Hospital Course: Pt underwent robot assisted simple prostatectomy on 22.  He did well post-op.  However, overnight on POD 0 his carbone catheter became kinked and he had 600 cc of irrigation in the bladder.  He had approximately 180 cc out of his ARSALAN after that incident.  His carbone was draining clear urine.  His CBI was clamped POD 1.  He was kept to monitor ARSALAN output.  Unfortunately, his ARSALAN output was not recorded and it is unclear what the output was over the past 24 hrs.    ARSALAN was sent for Cr and pending results he will be discharged with or without his ARSALAN drain.  I will     Discharge Medications:      Discharge Medications      New Medications      Instructions Start Date   cephalexin 500 MG capsule  Commonly known as: Keflex   500 mg, Oral, 2 Times Daily      cyclobenzaprine 5 MG tablet  Commonly known as: FLEXERIL   5 mg, Oral, 3 Times Daily PRN      docusate sodium 100 MG capsule  Commonly known as: Colace   100 mg, Oral, 2 Times Daily      oxybutynin 5 MG tablet  Commonly known as: DITROPAN   5 mg, Oral, Every 8 Hours PRN      oxyCODONE-acetaminophen 5-325 MG per tablet  Commonly known as: PERCOCET   1-2 tablets, Oral, Every 6 Hours PRN      phenazopyridine 200 MG tablet  Commonly known as: Pyridium   200 mg, Oral, 3 Times Daily PRN         Changes to Medications      Instructions Start Date   levothyroxine 50 MCG tablet  Commonly known as: Synthroid  What changed:  additional instructions   50 mcg, Oral, Daily      pregabalin 75 MG capsule  Commonly known as: LYRICA  What changed: additional instructions   75 mg, Oral, 2 Times Daily, Take 1 tab PO daily x 1 week, BID thereafter Discontinue Gabapentin         Continue These Medications      Instructions Start Date   atorvastatin 20 MG tablet  Commonly known as: Lipitor   20 mg, Oral, Nightly      freestyle lancets   E11.69      FreeStyle Kera 2 Wagoner device   No dose, route, or frequency recorded.      FreeStyle Kera 2 Sensor misc   1 each, Does not apply, Every 14 Days      glucose blood test strip   Use as instructed      glucose monitoring kit monitoring kit   1 each, Does not apply, Daily, E11.69      metFORMIN 500 MG tablet  Commonly known as: Glucophage   500 mg, Oral, 2 Times Daily With Meals      naproxen sodium 220 MG tablet  Commonly known as: ALEVE   220 mg, Oral, 2 Times Daily PRN, OTC      tamsulosin 0.4 MG capsule 24 hr capsule  Commonly known as: FLOMAX   0.4 mg, Oral, Daily             Allergies:   No Known Allergies    Physical Exam:   Vital Signs:   Vitals:    12/25/22 0300 12/25/22 0405 12/25/22 0500 12/25/22 0703   BP:  124/77  130/80   BP Location:  Left arm  Left arm   Patient Position:  Lying  Lying   Pulse:  75     Resp:  18  18   Temp:  98.2 °F (36.8 °C)  98.1 °F (36.7 °C)   TempSrc:  Oral  Oral   SpO2: 93% 95% 95% 97%   Weight:       Height:         Body mass index is 36.77 kg/m².     Physical Exam  Vitals and nursing note reviewed.   Constitutional:       General: He is awake. He is not in acute distress.     Appearance: Normal appearance. He is well-developed. He is obese.   HENT:      Head: Normocephalic and atraumatic.      Right Ear: External ear normal.      Left Ear: External ear normal.      Nose: Nose normal.   Eyes:      Conjunctiva/sclera: Conjunctivae normal.   Pulmonary:      Effort: Pulmonary effort is normal.   Abdominal:      General: There is no distension.      Palpations: Abdomen  is soft. There is no mass.      Tenderness: There is no abdominal tenderness. There is no right CVA tenderness, left CVA tenderness, guarding or rebound.      Hernia: No hernia is present. There is no hernia in the left inguinal area or right inguinal area.      Comments: Incision c/d/i   Genitourinary:     Pubic Area: No rash.       Penis: Normal.       Testes: Normal.      Rectum: No mass or tenderness. Normal anal tone.      Comments: Mayes with clear urine   Musculoskeletal:      Cervical back: Normal range of motion.   Lymphadenopathy:      Lower Body: No right inguinal adenopathy. No left inguinal adenopathy.   Skin:     General: Skin is warm.   Neurological:      General: No focal deficit present.      Mental Status: He is alert and oriented to person, place, and time.   Psychiatric:         Behavior: Behavior normal. Behavior is cooperative.         Pertinent Test Results:   Lab Results (all)     Procedure Component Value Units Date/Time    T4, Free [[id number]]  (Abnormal) Collected: 12/25/22 0437    Specimen: Blood Updated: 12/25/22 0906     Free T4 0.92 ng/dL     Narrative:      Results may be falsely increased if patient taking Biotin.      POC Glucose Once [[id number]]  (Normal) Collected: 12/25/22 0708    Specimen: Blood Updated: 12/25/22 0719     Glucose 123 mg/dL      Comment: Meter: [id number] : [id number] Александр Mccurdy       Basic Metabolic Panel [[id number]]  (Abnormal) Collected: 12/25/22 0437    Specimen: Blood Updated: 12/25/22 0647     Glucose 123 mg/dL      BUN 9 mg/dL      Creatinine 0.75 mg/dL      Sodium 136 mmol/L      Potassium 4.2 mmol/L      Chloride 102 mmol/L      CO2 24.0 mmol/L      Calcium 8.7 mg/dL      BUN/Creatinine Ratio 12.0     Anion Gap 10.0 mmol/L      eGFR 97.1 mL/min/1.73      Comment: National Kidney Foundation and American Society of Nephrology (ASN) Task Force recommended calculation based on the Chronic Kidney Disease Epidemiology Collaboration (CKD-EPI) equation  refit without adjustment for race.       Narrative:      GFR Normal >60  Chronic Kidney Disease <60  Kidney Failure <15      CBC (No Diff) [[id number]]  (Abnormal) Collected: 12/25/22 0437    Specimen: Blood Updated: 12/25/22 0623     WBC 6.70 10*3/mm3      RBC 3.68 10*6/mm3      Hemoglobin 11.0 g/dL      Hematocrit 32.2 %      MCV 87.5 fL      MCH 29.9 pg      MCHC 34.2 g/dL      RDW 13.8 %      RDW-SD 44.4 fl      MPV 10.0 fL      Platelets 194 10*3/mm3     TSH [[id number]]  (Abnormal) Collected: 12/24/22 1501    Specimen: Blood Updated: 12/24/22 1617     TSH 6.840 uIU/mL     Narrative:      Due to abnormal TSH results, suggest ordering Free T4.    Basic Metabolic Panel [[id number]]  (Abnormal) Collected: 12/24/22 1501    Specimen: Blood Updated: 12/24/22 1610     Glucose 129 mg/dL      BUN 11 mg/dL      Creatinine 0.77 mg/dL      Sodium 137 mmol/L      Potassium 4.6 mmol/L      Chloride 103 mmol/L      CO2 24.0 mmol/L      Calcium 8.6 mg/dL      BUN/Creatinine Ratio 14.3     Anion Gap 10.0 mmol/L      eGFR 96.3 mL/min/1.73      Comment: National Kidney Foundation and American Society of Nephrology (ASN) Task Force recommended calculation based on the Chronic Kidney Disease Epidemiology Collaboration (CKD-EPI) equation refit without adjustment for race.       Narrative:      GFR Normal >60  Chronic Kidney Disease <60  Kidney Failure <15      POC Glucose Once [[id number]]  (Abnormal) Collected: 12/24/22 1513    Specimen: Blood Updated: 12/24/22 1523     Glucose 142 mg/dL      Comment: Meter: [id number] : [id number] Magdaleno Gonzalez       POC Glucose Once [[id number]]  (Abnormal) Collected: 12/24/22 1108    Specimen: Blood Updated: 12/24/22 1110     Glucose 181 mg/dL      Comment: Meter: [id number] : [id number] Ryann Hernnadez       CBC (No Diff) [[id number]]  (Abnormal) Collected: 12/24/22 1006    Specimen: Blood Updated: 12/24/22 1029     WBC 12.48 10*3/mm3      RBC 3.86 10*6/mm3      Hemoglobin 11.7 g/dL       Hematocrit 35.0 %      MCV 90.7 fL      MCH 30.3 pg      MCHC 33.4 g/dL      RDW 14.2 %      RDW-SD 47.5 fl      MPV 10.6 fL      Platelets 178 10*3/mm3     POC Glucose Once [[id number]]  (Abnormal) Collected: 12/24/22 0719    Specimen: Blood Updated: 12/24/22 0737     Glucose 143 mg/dL      Comment: Meter: [id number] : [id number] Dolan Lisa       POC Glucose Once [[id number]]  (Abnormal) Collected: 12/23/22 1608    Specimen: Blood Updated: 12/23/22 1615     Glucose 217 mg/dL      Comment: Meter: [id number] : [id number] Александр Calli       Basic Metabolic Panel [[id number]]  (Abnormal) Collected: 12/23/22 1227    Specimen: Blood Updated: 12/23/22 1308     Glucose 277 mg/dL      BUN 12 mg/dL      Creatinine 0.89 mg/dL      Sodium 135 mmol/L      Potassium 4.4 mmol/L      Chloride 102 mmol/L      CO2 19.0 mmol/L      Calcium 8.5 mg/dL      BUN/Creatinine Ratio 13.5     Anion Gap 14.0 mmol/L      eGFR 92.2 mL/min/1.73      Comment: National Kidney Foundation and American Society of Nephrology (ASN) Task Force recommended calculation based on the Chronic Kidney Disease Epidemiology Collaboration (CKD-EPI) equation refit without adjustment for race.       Narrative:      GFR Normal >60  Chronic Kidney Disease <60  Kidney Failure <15      CBC (No Diff) [[id number]]  (Abnormal) Collected: 12/23/22 1227    Specimen: Blood Updated: 12/23/22 1258     WBC 11.29 10*3/mm3      RBC 3.99 10*6/mm3      Hemoglobin 12.0 g/dL      Hematocrit 33.5 %      MCV 84.0 fL      MCH 30.1 pg      MCHC 35.8 g/dL      RDW 13.8 %      RDW-SD 42.9 fl      MPV 10.0 fL      Platelets 203 10*3/mm3     Tissue Pathology Exam [[id number]] Collected: 12/23/22 0834    Specimen: Tissue from Prostate Updated: 12/23/22 1237    POC Glucose Once [[id number]]  (Abnormal) Collected: 12/23/22 1223    Specimen: Blood Updated: 12/23/22 1225     Glucose 252 mg/dL      Comment: Meter: [id number] : [id number] Matt Jimenez       POC Glucose Once [[id number]]   (Abnormal) Collected: 12/23/22 0639    Specimen: Blood Updated: 12/23/22 0653     Glucose 182 mg/dL      Comment: Meter: [id number] : [id number] Oliver Monroy             Imaging Results (All)     None          Discharge Disposition:    Home or Self Care    Discharge Diet:     Diet Instructions     Advance Diet as Tolerated            Activity at Discharge:     Activity Instructions     Discharge Activity      No heavy lifting until seen by MD  No driving while taking narcotic or pain medication              Follow Up:   Future Appointments   Date Time Provider Department Center   1/10/2023  8:00 AM Enrique Madrigal MD [facility]   1/13/2023 10:45 AM Gary Kaur MD [facility]     Additional Instructions for the Follow-ups that You Need to Schedule     Discharge Follow-up with Specified Provider: Dr. Stinson   As directed      To: Dr. Stinson    Follow Up Details: 1/4/22         [place] Cystogram Minimum 3 View   Jan 08, 2023      Exam reason: Evaluate for bladder leak .    Release to patient: Immediate               Test Results Pending at Discharge:    Pending Labs     Order Current Status    Creatinine, Body Fluid - Body Fluid, Abdominal Cavity In process    Tissue Pathology Exam In process          Tuyet Stinson MD   [facility] [place]  12/25/22  09:31 EST

## 2022-12-25 NOTE — PLAN OF CARE
Problem: Adult Inpatient Plan of Care  Goal: Absence of Hospital-Acquired Illness or Injury  Intervention: Identify and Manage Fall Risk  Description: Perform standard risk assessment on admission using a validated tool or comprehensive approach appropriate to the patient; reassess fall risk frequently, with change in status or transfer to another level of care.  Communicate fall injury risk to interprofessional healthcare team.  Determine need for increased observation, equipment and environmental modification, such as low bed, signage and supportive, nonskid footwear.  Adjust safety measures to individual developmental age, stage and identified risk factors.  Reinforce the importance of safety and physical activity with patient and family.  Perform regular intentional rounding to assess need for position change, pain assessment and personal needs, including assistance with toileting.  Recent Flowsheet Documentation  Taken 12/25/2022 0000 by Fabián Xavier, RN  Safety Promotion/Fall Prevention:   activity supervised   assistive device/personal items within reach   clutter free environment maintained   elopement precautions   fall prevention program maintained   nonskid shoes/slippers when out of bed   safety round/check completed  Taken 12/24/2022 2200 by Fabián Xavier, RN  Safety Promotion/Fall Prevention:   activity supervised   assistive device/personal items within reach   clutter free environment maintained   fall prevention program maintained   elopement precautions   nonskid shoes/slippers when out of bed   safety round/check completed  Taken 12/24/2022 2000 by Fabián Xavier, RN  Safety Promotion/Fall Prevention:   activity supervised   assistive device/personal items within reach   clutter free environment maintained   elopement precautions   fall prevention program maintained   nonskid shoes/slippers when out of bed   safety round/check completed  Intervention: Prevent Skin Injury  Description: Perform a  screening for skin injury risk, such as pressure or moisture associated skin damage on admission and at regular intervals throughout hospital stay.  Keep all areas of skin (especially folds) clean and dry.  Maintain adequate skin hydration.  Relieve and redistribute pressure and protect bony prominences; implement measures based on patient-specific risk factors.  Match turning and repositioning schedule to clinical condition.  Encourage weight shift frequently; assist with reposition if unable to complete independently.  Float heels off bed; avoid pressure on the Achilles tendon.  Keep skin free from extended contact with medical devices.  Encourage functional activity and mobility, as early as tolerated.  Use aids (e.g., slide boards, mechanical lift) during transfer.  Recent Flowsheet Documentation  Taken 12/25/2022 0000 by Fabián Xavier RN  Body Position: position changed independently  Taken 12/24/2022 2200 by Fabián Xavier RN  Body Position: position changed independently  Taken 12/24/2022 2000 by Fabián Xavier RN  Body Position: position changed independently  Skin Protection:   adhesive use limited   incontinence pads utilized   protective footwear used   skin-to-skin areas padded   tubing/devices free from skin contact  Intervention: Prevent and Manage VTE (Venous Thromboembolism) Risk  Description: Assess for VTE (venous thromboembolism) risk.  Encourage and assist with early ambulation.  Initiate and maintain compression or other therapy, as indicated, based on identified risk in accordance with organizational protocol and provider order.  Encourage both active and passive leg exercises while in bed, if unable to ambulate.  Recent Flowsheet Documentation  Taken 12/25/2022 0000 by Fabián Xavier RN  Activity Management:   activity adjusted per tolerance   dorsiflexion/plantar flexion performed  VTE Prevention/Management:   bilateral   sequential compression devices on  Taken 12/24/2022 2200 by Fabián Xavier  RN  Activity Management:   activity adjusted per tolerance   dorsiflexion/plantar flexion performed  VTE Prevention/Management:   bilateral   sequential compression devices on  Taken 12/24/2022 2000 by Fabián Xavier RN  Activity Management:   activity adjusted per tolerance   dorsiflexion/plantar flexion performed  VTE Prevention/Management:   bilateral   sequential compression devices on  Range of Motion: ROM (range of motion) performed  Intervention: Prevent Infection  Description: Maintain skin and mucous membrane integrity; promote hand, oral and pulmonary hygiene.  Optimize fluid balance, nutrition, sleep and glycemic control to maximize infection resistance.  Identify potential sources of infection early to prevent or mitigate progression of infection (e.g., wound, lines, devices).  Evaluate ongoing need for invasive devices; remove promptly when no longer indicated.  Recent Flowsheet Documentation  Taken 12/24/2022 2000 by Fabián Xavier RN  Infection Prevention:   environmental surveillance performed   hand hygiene promoted   single patient room provided   visitors restricted/screened  Goal: Optimal Comfort and Wellbeing  Intervention: Monitor Pain and Promote Comfort  Description: Assess pain level, treatment efficacy and patient response at regular intervals using a consistent pain scale.  Consider the presence and impact of preexisting chronic pain.  Encourage patient and caregiver involvement in pain assessment, interventions and safety measures.  Recent Flowsheet Documentation  Taken 12/25/2022 0100 by Fabián Xavier RN  Pain Management Interventions:   relaxation techniques promoted   see MAR   quiet environment facilitated   pain management plan reviewed with patient/caregiver  Taken 12/24/2022 2238 by Fabián Xavier RN  Pain Management Interventions:   quiet environment facilitated   see MAR   relaxation techniques promoted  Intervention: Provide Person-Centered Care  Description: Use a family-focused  approach to care.  Develop trust and rapport by proactively providing information, encouraging questions, addressing concerns and offering reassurance.  Acknowledge emotional response to hospitalization.  Recognize and utilize personal coping strategies.  Honor spiritual and cultural preferences.  Recent Flowsheet Documentation  Taken 12/24/2022 2000 by Fabián Xavier RN  Trust Relationship/Rapport:   care explained   choices provided   emotional support provided   empathic listening provided   questions answered   questions encouraged   reassurance provided   thoughts/feelings acknowledged     Problem: Fall Injury Risk  Goal: Absence of Fall and Fall-Related Injury  Intervention: Identify and Manage Contributors  Description: Develop a fall prevention plan with the patient and caregiver/family.  Provide reorientation, appropriate sensory stimulation and routines with changes in mental status to decrease risk of fall.  Promote use of personal vision and auditory aids.  Assess assistance level required for safe and effective self-care; provide support as needed, such as toileting, mobilization. For age 65 and older, implement timed toileting with assistance.  Encourage physical activity, such as performance of mobility and self-care at highest level of patient ability, multicomponent exercise program and provision of appropriate assistive devices.  If fall occurs, assess the severity of injury; implement fall injury protocol. Determine the cause and revise fall injury prevention plan.  Regularly review medication contribution to fall risk; adjust medication administration times to minimize risk of falling.  Consider risk related to polypharmacy and age.  Balance adequate pain management with potential for oversedation.  Recent Flowsheet Documentation  Taken 12/24/2022 2000 by Fabián Xavier RN  Medication Review/Management:   medications reviewed   high-risk medications identified  Intervention: Promote Injury-Free  Environment  Description: Provide a safe, barrier-free environment that encourages independent activity.  Keep care area uncluttered and well-lighted.  Determine need for increased observation or monitoring.  Avoid use of devices that minimize mobility, such as restraints or indwelling urinary catheter.  Recent Flowsheet Documentation  Taken 12/25/2022 0000 by Fabián Xavier RN  Safety Promotion/Fall Prevention:   activity supervised   assistive device/personal items within reach   clutter free environment maintained   elopement precautions   fall prevention program maintained   nonskid shoes/slippers when out of bed   safety round/check completed  Taken 12/24/2022 2200 by Fabián Xavier, RN  Safety Promotion/Fall Prevention:   activity supervised   assistive device/personal items within reach   clutter free environment maintained   fall prevention program maintained   elopement precautions   nonskid shoes/slippers when out of bed   safety round/check completed  Taken 12/24/2022 2000 by Fabián Xavier RN  Safety Promotion/Fall Prevention:   activity supervised   assistive device/personal items within reach   clutter free environment maintained   elopement precautions   fall prevention program maintained   nonskid shoes/slippers when out of bed   safety round/check completed     Problem: Bleeding (Surgery Nonspecified)  Goal: Absence of Bleeding  Intervention: Monitor and Manage Bleeding  Description: Assess bleeding risk and presence of bleeding; review laboratory result trends, medical history and physical presentation.  Identify source of bleeding; consider potential for additional diagnostic testing. If able, apply direct pressure to visible bleeding site; notify healthcare provider.  Maintain body temperature within desired range to optimize clotting ability.  Maintain dressing integrity to avoid disrupting clotting process; reinforce as needed.  Monitor and measure drainage amount and characteristics from surgical  site, drain and dressing.  Consider need for fluid volume replacement (e.g., intravenous fluid, blood products) to maintain perfusion.  Evaluate for orthostatic hypotension prior to activity.  Consider need for pharmacologic treatment, such as topical hemostatic, coagulation factor concentrate or antifibrinolytic.  Anticipate need for surgical intervention with continued bleeding and circulatory instability.  Recent Flowsheet Documentation  Taken 12/25/2022 0000 by Fabián Xavier RN  Bleeding Management: dressing monitored  Taken 12/24/2022 2000 by Fabián Xavier RN  Bleeding Management: dressing monitored     Problem: Ongoing Anesthesia Effects (Surgery Nonspecified)  Goal: Anesthesia/Sedation Recovery  Intervention: Optimize Anesthesia Recovery  Description: Assess and monitor airway, breathing and circulation; maintain close surveillance for deterioration.  Implement continuous monitoring, such as cardiorespiratory, blood pressure, temperature, pulse oximetry and capnography.  Elevate head of bed, if able; facilitate regular position changes.  Assess neurocognitive function and for risks that may lead to postoperative delirium, such as decreased level of consciousness, pain and agitation; offer reassurance; answer questions.  Assess and monitor neurovascular and neuromuscular function, such as motor strength, tone, posture, peripheral pulses and extremity sensation; protect areas of decreased sensation from heat, cold, medical devices or objects.  Individualize frequency and intensity of monitoring based on sedation or anesthesia administered, identified risk factors, ongoing assessment and organizational protocol.  Prepare for administration of pharmacologic therapy, such as reversal agent, antiemetic or antipruritic medication, to manage sedation or anesthesia effects.  Adjust environment to maintain safety (e.g., fall precautions, safety equipment).  Recent Flowsheet Documentation  Taken 12/25/2022 0000 by Morenita  Fabián RN  Safety Promotion/Fall Prevention:   activity supervised   assistive device/personal items within reach   clutter free environment maintained   elopement precautions   fall prevention program maintained   nonskid shoes/slippers when out of bed   safety round/check completed  Taken 12/24/2022 2200 by Fabián Xavier RN  Safety Promotion/Fall Prevention:   activity supervised   assistive device/personal items within reach   clutter free environment maintained   fall prevention program maintained   elopement precautions   nonskid shoes/slippers when out of bed   safety round/check completed  Taken 12/24/2022 2000 by Fabián Xavier RN  Safety Promotion/Fall Prevention:   activity supervised   assistive device/personal items within reach   clutter free environment maintained   elopement precautions   fall prevention program maintained   nonskid shoes/slippers when out of bed   safety round/check completed  Reorientation Measures:   calendar in view   clock in view     Problem: Pain (Surgery Nonspecified)  Goal: Acceptable Pain Control  Outcome: Ongoing, Progressing  Intervention: Prevent or Manage Pain  Description: Develop mutual pain management plan with patient and caregiver; review regularly.  Consider the presence and impact of preexisting chronic pain.  Encourage patient and caregiver involvement in pain assessment, interventions and safety measures.  Individualize pharmacologic pain management plan; titrate medication to patient response.  Combine multimodal analgesia and nonpharmacologic strategies to help potentiate synergistic effects and enhance comfort (e.g., complementary therapy, diversional activity).  Offer around-the-clock dosing of pain medication to keep pain levels in control.  Manage medication-induced effects, such as constipation, nausea, vomiting.  Support and optimize psychosocial response to pain.  Flowsheets  Taken 12/25/2022 0100  Pain Management Interventions:   relaxation techniques  promoted   see MAR   quiet environment facilitated   pain management plan reviewed with patient/caregiver  Taken 12/24/2022 9821  Pain Management Interventions:   quiet environment facilitated   see MAR   relaxation techniques promoted  Taken 12/24/2022 2000  Diversional Activities:   television   smartphone     Problem: Respiratory Compromise (Surgery Nonspecified)  Goal: Effective Oxygenation and Ventilation  Outcome: Ongoing, Progressing  Intervention: Optimize Oxygenation and Ventilation  Description: Recognize risk for obstructive sleep apnea; anticipate the need for continuous pulse oximetry and noninvasive positive pressure ventilation.  Maintain patent airway with use of positioning, airway adjuncts and secretion clearance.  Encourage pulmonary hygiene, such as cough-enhancement and airway-clearance techniques, that may include use of incentive spirometry, deep breathing and cough.  Anticipate the need for splinting with cough to minimize discomfort; assist if needed.  Provide oxygen therapy judiciously, if hypoxemia present.  Consider pharmacologic therapy that may improve mucus clearance and reduce bronchospasm, laryngospasm, swelling or stridor.  Consider the need for intubation and invasive positive pressure ventilation for longer recovery needs; use lung protective measures.  Flowsheets  Taken 12/25/2022 0000 by Fabián Xavier RN  Head of Bed (HOB) Positioning: HOB elevated  Taken 12/24/2022 2200 by Fabián Xavier RN  Head of Bed (HOB) Positioning: HOB elevated  Taken 12/24/2022 2000 by Fabián Xavier RN  Head of Bed (HOB) Positioning: HOB elevated  Taken 12/23/2022 1156 by Fabián Suggs RN  Airway/Ventilation Management: airway patency maintained   Goal Outcome Evaluation:

## 2022-12-29 LAB
CYTO UR: NORMAL
LAB AP CASE REPORT: NORMAL
LAB AP CLINICAL INFORMATION: NORMAL
LAB AP DIAGNOSIS COMMENT: NORMAL
PATH REPORT.FINAL DX SPEC: NORMAL
PATH REPORT.GROSS SPEC: NORMAL

## 2023-01-01 ENCOUNTER — APPOINTMENT (OUTPATIENT)
Dept: ULTRASOUND IMAGING | Facility: HOSPITAL | Age: 73
DRG: 698 | End: 2023-01-01
Payer: MEDICARE

## 2023-01-01 ENCOUNTER — APPOINTMENT (OUTPATIENT)
Dept: CT IMAGING | Facility: HOSPITAL | Age: 73
DRG: 698 | End: 2023-01-01
Payer: MEDICARE

## 2023-01-01 ENCOUNTER — HOSPITAL ENCOUNTER (INPATIENT)
Facility: HOSPITAL | Age: 73
LOS: 4 days | Discharge: HOME | DRG: 698 | End: 2023-01-09
Attending: EMERGENCY MEDICINE | Admitting: FAMILY MEDICINE
Payer: MEDICARE

## 2023-01-01 DIAGNOSIS — R33.8 BENIGN PROSTATIC HYPERPLASIA WITH URINARY RETENTION: ICD-10-CM

## 2023-01-01 DIAGNOSIS — E11.65 TYPE 2 DIABETES MELLITUS WITH HYPERGLYCEMIA, WITHOUT LONG-TERM CURRENT USE OF INSULIN: ICD-10-CM

## 2023-01-01 DIAGNOSIS — N50.89 TESTICULAR SWELLING: ICD-10-CM

## 2023-01-01 DIAGNOSIS — T83.89XA: ICD-10-CM

## 2023-01-01 DIAGNOSIS — N40.1 BENIGN PROSTATIC HYPERPLASIA WITH URINARY RETENTION: ICD-10-CM

## 2023-01-01 DIAGNOSIS — N43.1 PYOCELE: ICD-10-CM

## 2023-01-01 DIAGNOSIS — Z90.79 H/O PROSTATECTOMY: ICD-10-CM

## 2023-01-01 DIAGNOSIS — N45.1 EPIDIDYMITIS: ICD-10-CM

## 2023-01-01 DIAGNOSIS — A41.9 SEPSIS, DUE TO UNSPECIFIED ORGANISM, UNSPECIFIED WHETHER ACUTE ORGAN DYSFUNCTION PRESENT: Primary | ICD-10-CM

## 2023-01-01 PROBLEM — N40.0 BPH (BENIGN PROSTATIC HYPERPLASIA): Status: RESOLVED | Noted: 2022-10-16 | Resolved: 2023-01-01

## 2023-01-01 PROBLEM — N13.9 OBSTRUCTIVE UROPATHY: Status: RESOLVED | Noted: 2022-10-16 | Resolved: 2023-01-01

## 2023-01-01 LAB
ALBUMIN SERPL-MCNC: 3.7 G/DL (ref 3.5–5.2)
ALBUMIN/GLOB SERPL: 1 G/DL
ALP SERPL-CCNC: 128 U/L (ref 39–117)
ALT SERPL W P-5'-P-CCNC: 39 U/L (ref 1–41)
ANION GAP SERPL CALCULATED.3IONS-SCNC: 18 MMOL/L (ref 5–15)
AST SERPL-CCNC: 24 U/L (ref 1–40)
BACTERIA UR QL AUTO: ABNORMAL /HPF
BASOPHILS # BLD AUTO: 0.04 10*3/MM3 (ref 0–0.2)
BASOPHILS NFR BLD AUTO: 0.1 % (ref 0–1.5)
BILIRUB SERPL-MCNC: 1.6 MG/DL (ref 0–1.2)
BILIRUB UR QL STRIP: NEGATIVE
BUN SERPL-MCNC: 13 MG/DL (ref 8–23)
BUN/CREAT SERPL: 11.9 (ref 7–25)
CALCIUM SPEC-SCNC: 9.2 MG/DL (ref 8.6–10.5)
CHLORIDE SERPL-SCNC: 94 MMOL/L (ref 98–107)
CLARITY UR: ABNORMAL
CO2 SERPL-SCNC: 21 MMOL/L (ref 22–29)
COLOR UR: YELLOW
CREAT SERPL-MCNC: 1.09 MG/DL (ref 0.76–1.27)
D-LACTATE SERPL-SCNC: 4.2 MMOL/L (ref 0.5–2)
D-LACTATE SERPL-SCNC: 4.3 MMOL/L (ref 0.5–2)
DEPRECATED RDW RBC AUTO: 42.7 FL (ref 37–54)
EGFRCR SERPLBLD CKD-EPI 2021: 73 ML/MIN/1.73
EOSINOPHIL # BLD AUTO: 0.01 10*3/MM3 (ref 0–0.4)
EOSINOPHIL NFR BLD AUTO: 0 % (ref 0.3–6.2)
ERYTHROCYTE [DISTWIDTH] IN BLOOD BY AUTOMATED COUNT: 13.3 % (ref 12.3–15.4)
FLUAV RNA RESP QL NAA+PROBE: NOT DETECTED
FLUBV RNA RESP QL NAA+PROBE: NOT DETECTED
GLOBULIN UR ELPH-MCNC: 3.7 GM/DL
GLUCOSE SERPL-MCNC: 285 MG/DL (ref 65–99)
GLUCOSE UR STRIP-MCNC: NEGATIVE MG/DL
GRAN CASTS URNS QL MICRO: ABNORMAL /LPF
HCT VFR BLD AUTO: 37.6 % (ref 37.5–51)
HGB BLD-MCNC: 12.9 G/DL (ref 13–17.7)
HGB UR QL STRIP.AUTO: ABNORMAL
HYALINE CASTS UR QL AUTO: ABNORMAL /LPF
IMM GRANULOCYTES # BLD AUTO: 1.74 10*3/MM3 (ref 0–0.05)
IMM GRANULOCYTES NFR BLD AUTO: 3.7 % (ref 0–0.5)
KETONES UR QL STRIP: NEGATIVE
LEUKOCYTE ESTERASE UR QL STRIP.AUTO: ABNORMAL
LYMPHOCYTES # BLD AUTO: 0.5 10*3/MM3 (ref 0.7–3.1)
LYMPHOCYTES NFR BLD AUTO: 1.1 % (ref 19.6–45.3)
MCH RBC QN AUTO: 29.9 PG (ref 26.6–33)
MCHC RBC AUTO-ENTMCNC: 34.3 G/DL (ref 31.5–35.7)
MCV RBC AUTO: 87 FL (ref 79–97)
MONOCYTES # BLD AUTO: 2.1 10*3/MM3 (ref 0.1–0.9)
MONOCYTES NFR BLD AUTO: 4.4 % (ref 5–12)
NEUTROPHILS NFR BLD AUTO: 43.18 10*3/MM3 (ref 1.7–7)
NEUTROPHILS NFR BLD AUTO: 90.7 % (ref 42.7–76)
NITRITE UR QL STRIP: NEGATIVE
NRBC BLD AUTO-RTO: 0 /100 WBC (ref 0–0.2)
PH UR STRIP.AUTO: 6 [PH] (ref 5–8)
PLAT MORPH BLD: NORMAL
PLATELET # BLD AUTO: 333 10*3/MM3 (ref 140–450)
PMV BLD AUTO: 9.1 FL (ref 6–12)
POTASSIUM SERPL-SCNC: 3.6 MMOL/L (ref 3.5–5.2)
PROCALCITONIN SERPL-MCNC: 6.79 NG/ML (ref 0–0.25)
PROT SERPL-MCNC: 7.4 G/DL (ref 6–8.5)
PROT UR QL STRIP: ABNORMAL
RBC # BLD AUTO: 4.32 10*6/MM3 (ref 4.14–5.8)
RBC # UR STRIP: ABNORMAL /HPF
RBC MORPH BLD: NORMAL
REF LAB TEST METHOD: ABNORMAL
SARS-COV-2 RNA RESP QL NAA+PROBE: NOT DETECTED
SODIUM SERPL-SCNC: 133 MMOL/L (ref 136–145)
SP GR UR STRIP: <=1.005 (ref 1–1.03)
SQUAMOUS #/AREA URNS HPF: ABNORMAL /HPF
UROBILINOGEN UR QL STRIP: ABNORMAL
WBC # UR STRIP: ABNORMAL /HPF
WBC MORPH BLD: NORMAL
WBC NRBC COR # BLD: 47.57 10*3/MM3 (ref 3.4–10.8)

## 2023-01-01 PROCEDURE — P9612 CATHETERIZE FOR URINE SPEC: HCPCS

## 2023-01-01 PROCEDURE — 74177 CT ABD & PELVIS W/CONTRAST: CPT

## 2023-01-01 PROCEDURE — 81001 URINALYSIS AUTO W/SCOPE: CPT | Performed by: INTERNAL MEDICINE

## 2023-01-01 PROCEDURE — 99223 1ST HOSP IP/OBS HIGH 75: CPT | Performed by: INTERNAL MEDICINE

## 2023-01-01 PROCEDURE — 83605 ASSAY OF LACTIC ACID: CPT | Performed by: INTERNAL MEDICINE

## 2023-01-01 PROCEDURE — 99222 1ST HOSP IP/OBS MODERATE 55: CPT | Performed by: STUDENT IN AN ORGANIZED HEALTH CARE EDUCATION/TRAINING PROGRAM

## 2023-01-01 PROCEDURE — 25010000002 PIPERACILLIN SOD-TAZOBACTAM PER 1 G: Performed by: EMERGENCY MEDICINE

## 2023-01-01 PROCEDURE — 87186 SC STD MICRODIL/AGAR DIL: CPT | Performed by: INTERNAL MEDICINE

## 2023-01-01 PROCEDURE — 85007 BL SMEAR W/DIFF WBC COUNT: CPT | Performed by: EMERGENCY MEDICINE

## 2023-01-01 PROCEDURE — 0 IOPAMIDOL PER 1 ML: Performed by: EMERGENCY MEDICINE

## 2023-01-01 PROCEDURE — 25010000002 HEPARIN (PORCINE) PER 1000 UNITS: Performed by: NURSE PRACTITIONER

## 2023-01-01 PROCEDURE — 87636 SARSCOV2 & INF A&B AMP PRB: CPT | Performed by: EMERGENCY MEDICINE

## 2023-01-01 PROCEDURE — 51702 INSERT TEMP BLADDER CATH: CPT | Performed by: STUDENT IN AN ORGANIZED HEALTH CARE EDUCATION/TRAINING PROGRAM

## 2023-01-01 PROCEDURE — 87040 BLOOD CULTURE FOR BACTERIA: CPT | Performed by: EMERGENCY MEDICINE

## 2023-01-01 PROCEDURE — 85025 COMPLETE CBC W/AUTO DIFF WBC: CPT | Performed by: EMERGENCY MEDICINE

## 2023-01-01 PROCEDURE — 84145 PROCALCITONIN (PCT): CPT | Performed by: EMERGENCY MEDICINE

## 2023-01-01 PROCEDURE — 25010000002 SODIUM CHLORIDE 0.9 % WITH KCL 20 MEQ 20-0.9 MEQ/L-% SOLUTION: Performed by: NURSE PRACTITIONER

## 2023-01-01 PROCEDURE — 87077 CULTURE AEROBIC IDENTIFY: CPT | Performed by: INTERNAL MEDICINE

## 2023-01-01 PROCEDURE — 87086 URINE CULTURE/COLONY COUNT: CPT | Performed by: INTERNAL MEDICINE

## 2023-01-01 PROCEDURE — 25010000002 VANCOMYCIN 10 G RECONSTITUTED SOLUTION: Performed by: EMERGENCY MEDICINE

## 2023-01-01 PROCEDURE — 76870 US EXAM SCROTUM: CPT

## 2023-01-01 PROCEDURE — 80053 COMPREHEN METABOLIC PANEL: CPT | Performed by: EMERGENCY MEDICINE

## 2023-01-01 PROCEDURE — 25010000002 MORPHINE PER 10 MG: Performed by: EMERGENCY MEDICINE

## 2023-01-01 PROCEDURE — 99285 EMERGENCY DEPT VISIT HI MDM: CPT

## 2023-01-01 PROCEDURE — 83605 ASSAY OF LACTIC ACID: CPT | Performed by: EMERGENCY MEDICINE

## 2023-01-01 PROCEDURE — 36415 COLL VENOUS BLD VENIPUNCTURE: CPT

## 2023-01-01 RX ORDER — ONDANSETRON 2 MG/ML
4 INJECTION INTRAMUSCULAR; INTRAVENOUS
Status: DISCONTINUED | OUTPATIENT
Start: 2023-01-01 | End: 2023-01-01 | Stop reason: SDUPTHER

## 2023-01-01 RX ORDER — OXYBUTYNIN CHLORIDE 5 MG/1
5 TABLET ORAL EVERY 8 HOURS PRN
Status: DISCONTINUED | OUTPATIENT
Start: 2023-01-01 | End: 2023-01-09 | Stop reason: HOSPADM

## 2023-01-01 RX ORDER — SODIUM CHLORIDE AND POTASSIUM CHLORIDE 150; 900 MG/100ML; MG/100ML
125 INJECTION, SOLUTION INTRAVENOUS CONTINUOUS
Status: ACTIVE | OUTPATIENT
Start: 2023-01-01 | End: 2023-01-02

## 2023-01-01 RX ORDER — CYCLOBENZAPRINE HCL 10 MG
5 TABLET ORAL 3 TIMES DAILY PRN
Status: DISCONTINUED | OUTPATIENT
Start: 2023-01-01 | End: 2023-01-09 | Stop reason: HOSPADM

## 2023-01-01 RX ORDER — DEXTROSE MONOHYDRATE 25 G/50ML
25 INJECTION, SOLUTION INTRAVENOUS
Status: DISCONTINUED | OUTPATIENT
Start: 2023-01-01 | End: 2023-01-09 | Stop reason: HOSPADM

## 2023-01-01 RX ORDER — AMOXICILLIN 250 MG
2 CAPSULE ORAL 2 TIMES DAILY
Status: DISCONTINUED | OUTPATIENT
Start: 2023-01-01 | End: 2023-01-09 | Stop reason: HOSPADM

## 2023-01-01 RX ORDER — INSULIN LISPRO 100 [IU]/ML
0-7 INJECTION, SOLUTION INTRAVENOUS; SUBCUTANEOUS
Status: DISCONTINUED | OUTPATIENT
Start: 2023-01-02 | End: 2023-01-09 | Stop reason: HOSPADM

## 2023-01-01 RX ORDER — DOCUSATE SODIUM 100 MG/1
100 CAPSULE, LIQUID FILLED ORAL 2 TIMES DAILY
Status: DISCONTINUED | OUTPATIENT
Start: 2023-01-01 | End: 2023-01-07

## 2023-01-01 RX ORDER — SODIUM CHLORIDE 0.9 % (FLUSH) 0.9 %
10 SYRINGE (ML) INJECTION EVERY 12 HOURS SCHEDULED
Status: DISCONTINUED | OUTPATIENT
Start: 2023-01-01 | End: 2023-01-09 | Stop reason: HOSPADM

## 2023-01-01 RX ORDER — ACETAMINOPHEN 500 MG
1000 TABLET ORAL EVERY 8 HOURS
Status: DISCONTINUED | OUTPATIENT
Start: 2023-01-01 | End: 2023-01-09 | Stop reason: HOSPADM

## 2023-01-01 RX ORDER — HYDROMORPHONE HYDROCHLORIDE 1 MG/ML
0.5 INJECTION, SOLUTION INTRAMUSCULAR; INTRAVENOUS; SUBCUTANEOUS
Status: DISCONTINUED | OUTPATIENT
Start: 2023-01-01 | End: 2023-01-07

## 2023-01-01 RX ORDER — OXYCODONE HYDROCHLORIDE 5 MG/1
5 TABLET ORAL EVERY 4 HOURS PRN
Status: DISPENSED | OUTPATIENT
Start: 2023-01-01 | End: 2023-01-08

## 2023-01-01 RX ORDER — BISACODYL 5 MG/1
5 TABLET, DELAYED RELEASE ORAL DAILY PRN
Status: DISCONTINUED | OUTPATIENT
Start: 2023-01-01 | End: 2023-01-09 | Stop reason: HOSPADM

## 2023-01-01 RX ORDER — NICOTINE POLACRILEX 4 MG
15 LOZENGE BUCCAL
Status: DISCONTINUED | OUTPATIENT
Start: 2023-01-01 | End: 2023-01-09 | Stop reason: HOSPADM

## 2023-01-01 RX ORDER — ATORVASTATIN CALCIUM 20 MG/1
20 TABLET, FILM COATED ORAL NIGHTLY
Status: DISCONTINUED | OUTPATIENT
Start: 2023-01-01 | End: 2023-01-09 | Stop reason: HOSPADM

## 2023-01-01 RX ORDER — TAMSULOSIN HYDROCHLORIDE 0.4 MG/1
0.4 CAPSULE ORAL NIGHTLY
Status: DISCONTINUED | OUTPATIENT
Start: 2023-01-01 | End: 2023-01-09 | Stop reason: HOSPADM

## 2023-01-01 RX ORDER — CHOLECALCIFEROL (VITAMIN D3) 125 MCG
5 CAPSULE ORAL NIGHTLY PRN
Status: DISCONTINUED | OUTPATIENT
Start: 2023-01-01 | End: 2023-01-09 | Stop reason: HOSPADM

## 2023-01-01 RX ORDER — PHENAZOPYRIDINE HYDROCHLORIDE 100 MG/1
200 TABLET, FILM COATED ORAL 3 TIMES DAILY PRN
Status: DISCONTINUED | OUTPATIENT
Start: 2023-01-01 | End: 2023-01-09 | Stop reason: HOSPADM

## 2023-01-01 RX ORDER — SODIUM CHLORIDE 0.9 % (FLUSH) 0.9 %
10 SYRINGE (ML) INJECTION AS NEEDED
Status: DISCONTINUED | OUTPATIENT
Start: 2023-01-01 | End: 2023-01-09 | Stop reason: HOSPADM

## 2023-01-01 RX ORDER — BISACODYL 10 MG
10 SUPPOSITORY, RECTAL RECTAL DAILY PRN
Status: DISCONTINUED | OUTPATIENT
Start: 2023-01-01 | End: 2023-01-09 | Stop reason: HOSPADM

## 2023-01-01 RX ORDER — HYOSCYAMINE SULFATE 0.125 MG
125 TABLET,DISINTEGRATING ORAL EVERY 4 HOURS PRN
Status: DISCONTINUED | OUTPATIENT
Start: 2023-01-01 | End: 2023-01-09 | Stop reason: HOSPADM

## 2023-01-01 RX ORDER — ONDANSETRON 2 MG/ML
4 INJECTION INTRAMUSCULAR; INTRAVENOUS EVERY 6 HOURS PRN
Status: DISCONTINUED | OUTPATIENT
Start: 2023-01-01 | End: 2023-01-09 | Stop reason: HOSPADM

## 2023-01-01 RX ORDER — MORPHINE SULFATE 4 MG/ML
4 INJECTION, SOLUTION INTRAMUSCULAR; INTRAVENOUS ONCE
Status: COMPLETED | OUTPATIENT
Start: 2023-01-01 | End: 2023-01-01

## 2023-01-01 RX ORDER — SODIUM CHLORIDE 0.9 % (FLUSH) 0.9 %
10 SYRINGE (ML) INJECTION AS NEEDED
Status: DISCONTINUED | OUTPATIENT
Start: 2023-01-01 | End: 2023-01-06

## 2023-01-01 RX ORDER — HEPARIN SODIUM 5000 [USP'U]/ML
5000 INJECTION, SOLUTION INTRAVENOUS; SUBCUTANEOUS EVERY 8 HOURS SCHEDULED
Status: DISCONTINUED | OUTPATIENT
Start: 2023-01-01 | End: 2023-01-09 | Stop reason: HOSPADM

## 2023-01-01 RX ORDER — SODIUM CHLORIDE 9 MG/ML
40 INJECTION, SOLUTION INTRAVENOUS AS NEEDED
Status: DISCONTINUED | OUTPATIENT
Start: 2023-01-01 | End: 2023-01-09 | Stop reason: HOSPADM

## 2023-01-01 RX ORDER — POLYETHYLENE GLYCOL 3350 17 G/17G
17 POWDER, FOR SOLUTION ORAL DAILY PRN
Status: DISCONTINUED | OUTPATIENT
Start: 2023-01-01 | End: 2023-01-09 | Stop reason: HOSPADM

## 2023-01-01 RX ORDER — LEVOTHYROXINE SODIUM 0.05 MG/1
50 TABLET ORAL
Status: DISCONTINUED | OUTPATIENT
Start: 2023-01-02 | End: 2023-01-09 | Stop reason: HOSPADM

## 2023-01-01 RX ADMIN — SODIUM CHLORIDE 1000 ML: 9 INJECTION, SOLUTION INTRAVENOUS at 20:30

## 2023-01-01 RX ADMIN — HEPARIN SODIUM 5000 UNITS: 5000 INJECTION INTRAVENOUS; SUBCUTANEOUS at 22:11

## 2023-01-01 RX ADMIN — SODIUM CHLORIDE 1000 ML: 9 INJECTION, SOLUTION INTRAVENOUS at 17:45

## 2023-01-01 RX ADMIN — SODIUM CHLORIDE 2121 ML: 9 INJECTION, SOLUTION INTRAVENOUS at 17:31

## 2023-01-01 RX ADMIN — SENNOSIDES AND DOCUSATE SODIUM 2 TABLET: 50; 8.6 TABLET ORAL at 20:30

## 2023-01-01 RX ADMIN — VANCOMYCIN HYDROCHLORIDE 2000 MG: 10 INJECTION, POWDER, LYOPHILIZED, FOR SOLUTION INTRAVENOUS at 17:38

## 2023-01-01 RX ADMIN — ACETAMINOPHEN 1000 MG: 500 TABLET, FILM COATED ORAL at 20:30

## 2023-01-01 RX ADMIN — IOPAMIDOL 95 ML: 755 INJECTION, SOLUTION INTRAVENOUS at 15:10

## 2023-01-01 RX ADMIN — OXYCODONE 5 MG: 5 TABLET ORAL at 16:57

## 2023-01-01 RX ADMIN — Medication 10 ML: at 20:30

## 2023-01-01 RX ADMIN — ATORVASTATIN CALCIUM 20 MG: 20 TABLET, FILM COATED ORAL at 20:30

## 2023-01-01 RX ADMIN — MORPHINE SULFATE 4 MG: 4 INJECTION, SOLUTION INTRAMUSCULAR; INTRAVENOUS at 13:58

## 2023-01-01 RX ADMIN — TAMSULOSIN HYDROCHLORIDE 0.4 MG: 0.4 CAPSULE ORAL at 20:30

## 2023-01-01 RX ADMIN — TAZOBACTAM SODIUM AND PIPERACILLIN SODIUM 3.38 G: 375; 3 INJECTION, SOLUTION INTRAVENOUS at 13:59

## 2023-01-01 RX ADMIN — DOCUSATE SODIUM 100 MG: 100 CAPSULE, LIQUID FILLED ORAL at 20:30

## 2023-01-01 RX ADMIN — POTASSIUM CHLORIDE AND SODIUM CHLORIDE 125 ML/HR: 900; 150 INJECTION, SOLUTION INTRAVENOUS at 21:41
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
## 2023-01-01 NOTE — Clinical Note
Level of Care: Telemetry [5]   Diagnosis: Sepsis, due to unspecified organism, unspecified whether acute organ dysfunction present (HCC) [[id number]]   Admitting Physician: VANI CALL [[id number]]   Attending Physician: VANI CALL [[id number]]   Bed Request Comments: urology postop

## 2023-01-02 LAB
ANION GAP SERPL CALCULATED.3IONS-SCNC: 14 MMOL/L (ref 5–15)
BASOPHILS # BLD AUTO: 0.14 10*3/MM3 (ref 0–0.2)
BASOPHILS NFR BLD AUTO: 0.3 % (ref 0–1.5)
BUN SERPL-MCNC: 10 MG/DL (ref 8–23)
BUN/CREAT SERPL: 13.9 (ref 7–25)
CALCIUM SPEC-SCNC: 8.6 MG/DL (ref 8.6–10.5)
CHLORIDE SERPL-SCNC: 100 MMOL/L (ref 98–107)
CO2 SERPL-SCNC: 20 MMOL/L (ref 22–29)
CREAT SERPL-MCNC: 0.72 MG/DL (ref 0.76–1.27)
DEPRECATED RDW RBC AUTO: 43 FL (ref 37–54)
EGFRCR SERPLBLD CKD-EPI 2021: 98.3 ML/MIN/1.73
EOSINOPHIL # BLD AUTO: 0.01 10*3/MM3 (ref 0–0.4)
EOSINOPHIL NFR BLD AUTO: 0 % (ref 0.3–6.2)
ERYTHROCYTE [DISTWIDTH] IN BLOOD BY AUTOMATED COUNT: 13.6 % (ref 12.3–15.4)
GLUCOSE BLDC GLUCOMTR-MCNC: 186 MG/DL (ref 70–130)
GLUCOSE BLDC GLUCOMTR-MCNC: 193 MG/DL (ref 70–130)
GLUCOSE BLDC GLUCOMTR-MCNC: 199 MG/DL (ref 70–130)
GLUCOSE SERPL-MCNC: 175 MG/DL (ref 65–99)
HCT VFR BLD AUTO: 32.2 % (ref 37.5–51)
HGB BLD-MCNC: 10.7 G/DL (ref 13–17.7)
IMM GRANULOCYTES # BLD AUTO: 0.91 10*3/MM3 (ref 0–0.05)
IMM GRANULOCYTES NFR BLD AUTO: 2.2 % (ref 0–0.5)
LYMPHOCYTES # BLD AUTO: 1.02 10*3/MM3 (ref 0.7–3.1)
LYMPHOCYTES NFR BLD AUTO: 2.4 % (ref 19.6–45.3)
MAGNESIUM SERPL-MCNC: 1.5 MG/DL (ref 1.6–2.4)
MCH RBC QN AUTO: 29 PG (ref 26.6–33)
MCHC RBC AUTO-ENTMCNC: 33.2 G/DL (ref 31.5–35.7)
MCV RBC AUTO: 87.3 FL (ref 79–97)
MONOCYTES # BLD AUTO: 2 10*3/MM3 (ref 0.1–0.9)
MONOCYTES NFR BLD AUTO: 4.7 % (ref 5–12)
MRSA DNA SPEC QL NAA+PROBE: NEGATIVE
NEUTROPHILS NFR BLD AUTO: 38.24 10*3/MM3 (ref 1.7–7)
NEUTROPHILS NFR BLD AUTO: 90.4 % (ref 42.7–76)
NRBC BLD AUTO-RTO: 0 /100 WBC (ref 0–0.2)
PLAT MORPH BLD: NORMAL
PLATELET # BLD AUTO: 301 10*3/MM3 (ref 140–450)
PMV BLD AUTO: 9.3 FL (ref 6–12)
POTASSIUM SERPL-SCNC: 3.6 MMOL/L (ref 3.5–5.2)
RBC # BLD AUTO: 3.69 10*6/MM3 (ref 4.14–5.8)
RBC MORPH BLD: NORMAL
SODIUM SERPL-SCNC: 134 MMOL/L (ref 136–145)
VANCOMYCIN SERPL-MCNC: 4.2 MCG/ML (ref 5–40)
WBC MORPH BLD: NORMAL
WBC NRBC COR # BLD: 42.32 10*3/MM3 (ref 3.4–10.8)

## 2023-01-02 PROCEDURE — 85025 COMPLETE CBC W/AUTO DIFF WBC: CPT | Performed by: NURSE PRACTITIONER

## 2023-01-02 PROCEDURE — 25010000002 HEPARIN (PORCINE) PER 1000 UNITS: Performed by: NURSE PRACTITIONER

## 2023-01-02 PROCEDURE — 83735 ASSAY OF MAGNESIUM: CPT | Performed by: NURSE PRACTITIONER

## 2023-01-02 PROCEDURE — 25010000002 SODIUM CHLORIDE 0.9 % WITH KCL 20 MEQ 20-0.9 MEQ/L-% SOLUTION: Performed by: NURSE PRACTITIONER

## 2023-01-02 PROCEDURE — 82962 GLUCOSE BLOOD TEST: CPT

## 2023-01-02 PROCEDURE — 80048 BASIC METABOLIC PNL TOTAL CA: CPT | Performed by: NURSE PRACTITIONER

## 2023-01-02 PROCEDURE — G0378 HOSPITAL OBSERVATION PER HR: HCPCS

## 2023-01-02 PROCEDURE — 80202 ASSAY OF VANCOMYCIN: CPT | Performed by: NURSE PRACTITIONER

## 2023-01-02 PROCEDURE — 99232 SBSQ HOSP IP/OBS MODERATE 35: CPT | Performed by: STUDENT IN AN ORGANIZED HEALTH CARE EDUCATION/TRAINING PROGRAM

## 2023-01-02 PROCEDURE — 87641 MR-STAPH DNA AMP PROBE: CPT

## 2023-01-02 PROCEDURE — 97530 THERAPEUTIC ACTIVITIES: CPT

## 2023-01-02 PROCEDURE — 85007 BL SMEAR W/DIFF WBC COUNT: CPT | Performed by: NURSE PRACTITIONER

## 2023-01-02 PROCEDURE — 25010000002 PIPERACILLIN SOD-TAZOBACTAM PER 1 G: Performed by: NURSE PRACTITIONER

## 2023-01-02 PROCEDURE — 25010000002 MAGNESIUM SULFATE 2 GM/50ML SOLUTION: Performed by: INTERNAL MEDICINE

## 2023-01-02 PROCEDURE — 63710000001 INSULIN LISPRO (HUMAN) PER 5 UNITS: Performed by: NURSE PRACTITIONER

## 2023-01-02 PROCEDURE — 97161 PT EVAL LOW COMPLEX 20 MIN: CPT

## 2023-01-02 PROCEDURE — 99232 SBSQ HOSP IP/OBS MODERATE 35: CPT | Performed by: INTERNAL MEDICINE

## 2023-01-02 PROCEDURE — 25010000002 VANCOMYCIN 10 G RECONSTITUTED SOLUTION

## 2023-01-02 RX ORDER — MAGNESIUM SULFATE HEPTAHYDRATE 40 MG/ML
4 INJECTION, SOLUTION INTRAVENOUS AS NEEDED
Status: DISCONTINUED | OUTPATIENT
Start: 2023-01-02 | End: 2023-01-09 | Stop reason: HOSPADM

## 2023-01-02 RX ORDER — POTASSIUM CHLORIDE 7.45 MG/ML
10 INJECTION INTRAVENOUS
Status: DISCONTINUED | OUTPATIENT
Start: 2023-01-02 | End: 2023-01-09 | Stop reason: HOSPADM

## 2023-01-02 RX ORDER — POTASSIUM CHLORIDE 750 MG/1
40 CAPSULE, EXTENDED RELEASE ORAL AS NEEDED
Status: DISCONTINUED | OUTPATIENT
Start: 2023-01-02 | End: 2023-01-09 | Stop reason: HOSPADM

## 2023-01-02 RX ORDER — MAGNESIUM SULFATE HEPTAHYDRATE 40 MG/ML
2 INJECTION, SOLUTION INTRAVENOUS AS NEEDED
Status: DISCONTINUED | OUTPATIENT
Start: 2023-01-02 | End: 2023-01-09 | Stop reason: HOSPADM

## 2023-01-02 RX ORDER — POTASSIUM CHLORIDE 1.5 G/1.77G
40 POWDER, FOR SOLUTION ORAL AS NEEDED
Status: DISCONTINUED | OUTPATIENT
Start: 2023-01-02 | End: 2023-01-09 | Stop reason: HOSPADM

## 2023-01-02 RX ADMIN — INSULIN LISPRO 2 UNITS: 100 INJECTION, SOLUTION INTRAVENOUS; SUBCUTANEOUS at 16:50

## 2023-01-02 RX ADMIN — ACETAMINOPHEN 1000 MG: 500 TABLET, FILM COATED ORAL at 21:08

## 2023-01-02 RX ADMIN — POTASSIUM CHLORIDE AND SODIUM CHLORIDE 125 ML/HR: 900; 150 INJECTION, SOLUTION INTRAVENOUS at 14:10

## 2023-01-02 RX ADMIN — INSULIN LISPRO 2 UNITS: 100 INJECTION, SOLUTION INTRAVENOUS; SUBCUTANEOUS at 08:41

## 2023-01-02 RX ADMIN — HEPARIN SODIUM 5000 UNITS: 5000 INJECTION INTRAVENOUS; SUBCUTANEOUS at 05:57

## 2023-01-02 RX ADMIN — SENNOSIDES AND DOCUSATE SODIUM 2 TABLET: 50; 8.6 TABLET ORAL at 08:42

## 2023-01-02 RX ADMIN — SENNOSIDES AND DOCUSATE SODIUM 2 TABLET: 50; 8.6 TABLET ORAL at 21:08

## 2023-01-02 RX ADMIN — Medication 10 ML: at 21:09

## 2023-01-02 RX ADMIN — HEPARIN SODIUM 5000 UNITS: 5000 INJECTION INTRAVENOUS; SUBCUTANEOUS at 13:50

## 2023-01-02 RX ADMIN — ATORVASTATIN CALCIUM 20 MG: 20 TABLET, FILM COATED ORAL at 21:08

## 2023-01-02 RX ADMIN — LEVOTHYROXINE SODIUM 50 MCG: 50 TABLET ORAL at 05:57

## 2023-01-02 RX ADMIN — ACETAMINOPHEN 1000 MG: 500 TABLET, FILM COATED ORAL at 13:50

## 2023-01-02 RX ADMIN — MAGNESIUM SULFATE HEPTAHYDRATE 2 G: 2 INJECTION, SOLUTION INTRAVENOUS at 13:49

## 2023-01-02 RX ADMIN — MAGNESIUM SULFATE HEPTAHYDRATE 2 G: 2 INJECTION, SOLUTION INTRAVENOUS at 16:50

## 2023-01-02 RX ADMIN — POTASSIUM CHLORIDE AND SODIUM CHLORIDE 125 ML/HR: 900; 150 INJECTION, SOLUTION INTRAVENOUS at 05:57

## 2023-01-02 RX ADMIN — TAMSULOSIN HYDROCHLORIDE 0.4 MG: 0.4 CAPSULE ORAL at 21:08

## 2023-01-02 RX ADMIN — DOCUSATE SODIUM 100 MG: 100 CAPSULE, LIQUID FILLED ORAL at 21:08

## 2023-01-02 RX ADMIN — HEPARIN SODIUM 5000 UNITS: 5000 INJECTION INTRAVENOUS; SUBCUTANEOUS at 22:06

## 2023-01-02 RX ADMIN — POTASSIUM CHLORIDE 40 MEQ: 750 CAPSULE, EXTENDED RELEASE ORAL at 11:48

## 2023-01-02 RX ADMIN — OXYCODONE 5 MG: 5 TABLET ORAL at 04:12

## 2023-01-02 RX ADMIN — TAZOBACTAM SODIUM AND PIPERACILLIN SODIUM 3.38 G: 375; 3 INJECTION, SOLUTION INTRAVENOUS at 21:08

## 2023-01-02 RX ADMIN — OXYCODONE 5 MG: 5 TABLET ORAL at 11:00

## 2023-01-02 RX ADMIN — MAGNESIUM SULFATE HEPTAHYDRATE 2 G: 2 INJECTION, SOLUTION INTRAVENOUS at 11:48

## 2023-01-02 RX ADMIN — Medication 10 ML: at 08:45

## 2023-01-02 RX ADMIN — OXYCODONE 5 MG: 5 TABLET ORAL at 18:41

## 2023-01-02 RX ADMIN — INSULIN LISPRO 2 UNITS: 100 INJECTION, SOLUTION INTRAVENOUS; SUBCUTANEOUS at 11:48

## 2023-01-02 RX ADMIN — VANCOMYCIN HYDROCHLORIDE 1500 MG: 10 INJECTION, POWDER, LYOPHILIZED, FOR SOLUTION INTRAVENOUS at 22:17

## 2023-01-02 RX ADMIN — DOCUSATE SODIUM 100 MG: 100 CAPSULE, LIQUID FILLED ORAL at 08:42

## 2023-01-02 RX ADMIN — VANCOMYCIN HYDROCHLORIDE 1500 MG: 10 INJECTION, POWDER, LYOPHILIZED, FOR SOLUTION INTRAVENOUS at 12:13

## 2023-01-02 RX ADMIN — ACETAMINOPHEN 1000 MG: 500 TABLET, FILM COATED ORAL at 04:12
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
## 2023-01-02 NOTE — CASE MANAGEMENT/SOCIAL WORK
Discharge Planning Assessment  [facility]     Patient Name: Cj Cifuentes  MRN: [id number]  Today's Date: 1/2/2023    Admit Date: 1/1/2023        Discharge Needs Assessment     Row Name 01/02/23 1152       Living Environment    People in Home spouse    Current Living Arrangements home    Primary Care Provided by self    Provides Primary Care For no one    Family Caregiver if Needed spouse    Quality of Family Relationships helpful;involved    Able to Return to Prior Arrangements yes       Resource/Environmental Concerns    Resource/Environmental Concerns none       Transition Planning    Patient/Family Anticipates Transition to home with family    Patient/Family Anticipated Services at Transition     Transportation Anticipated family or friend will provide       Discharge Needs Assessment    Readmission Within the Last 30 Days no previous admission in last 30 days    Equipment Currently Used at Home cane, straight;walker, standard;glucometer    Concerns to be Addressed denies needs/concerns at this time    Anticipated Changes Related to Illness none    Equipment Needed After Discharge none               Discharge Plan     Row Name 01/02/23 9785       Plan    Plan Comments Patient lives with his spouse in [facility]. He is independent of ADLs at baseline and has a cane, walker, and glucometer at home. He is status post a urological procedure for his prostate with Dr. Stinson, on December 23, discharged on the 25th with an indwelling urinary catheter in place, now admitted with testicular pain, swelling, intermittent fever, chills. IV antbx continue at this time. Will be sure PT is ordered and will follow for their recommendations. Goal is to return home with spouse upon discharge, CM will continue to follow - chase [phone]    Final Discharge Disposition Code 01 - home or self-care              Continued Care and Services - Admitted Since 1/1/2023    Coordination has not been started for this  encounter.     Selected Continued Care - Prior Encounters Includes continued care and service providers with selected services from prior encounters from 10/3/2022 to 1/2/2023    Discharged on 10/20/2022 Admission date: 10/16/2022 - Discharge disposition: Home or Self Care    Durable Medical Equipment     Service Provider Selected Services Address Phone Fax Patient Preferred    [facility] [address] [place] [phone] --                    Expected Discharge Date and Time     Expected Discharge Date Expected Discharge Time    Jan 4, 2023          Demographic Summary     Row Name 01/02/23 1151       General Information    Admission Type inpatient    Arrived From home    Referral Source admission list    Reason for Consult discharge planning    Preferred Language English       Contact Information    Permission Granted to Share Info With                Functional Status     Row Name 01/02/23 1151       Functional Status    Usual Activity Tolerance good    Current Activity Tolerance moderate       Functional Status, IADL    Medications independent    Meal Preparation independent    Housekeeping assistive person    Laundry assistive person    Shopping assistive person    IADL Comments cane, walker, and spouse as needed               Psychosocial    No documentation.                Abuse/Neglect    No documentation.                Legal    No documentation.                Substance Abuse    No documentation.                Patient Forms    No documentation.                   Colette Jarquin RN

## 2023-01-02 NOTE — PROGRESS NOTES
[facility]   Urology Progress Note    Patient Name: Cj Cifuentes  : 1950  MRN: [id number]  Primary Care Physician:  Gary Kaur MD  Date of admission: 2023    Subjective   Subjective     Chief Complaint: Urosepsis, L epididymoorchitis     HPI:  Patient Reports improvement in pain but still requiring narcotics. Catheter exchanged yesterday, draining clear yellow urine. Eating well. Has ambulated minimally.     WBC minimally improved 42 from 47.     Afebrile. He did fever to 103 F yesterday 7 PM.    UOP 2050 mL/24 hours.   Cr preserved 0.7.   Cultures pending    Review of Systems   All systems were reviewed and negative except for: None    Objective   Objective     Vitals:   Temp:  [98.4 °F (36.9 °C)-103.1 °F (39.5 °C)] 98.8 °F (37.1 °C)  Heart Rate:  [] 94  Resp:  [16-20] 16  BP: (107-141)/(63-91) 141/73  Flow (L/min):  [2] 2  Physical Exam    Constitutional: Awake in bed, alert   Eyes: PERRLA, sclerae anicteric, no conjunctival injection   HENT: Normocephalic, atraumatic, mucous membranes moist   Neck: Supple, trachea midline   Respiratory: Equal chest rise, non-labored respirations    Cardiovascular: RRR, palpable radial pulses bilaterally   Gastrointestinal: Soft, nontender, non-distended   Genitourinary: Buried circumcised phallus, orthotopic meatus, right testicle palpably normal, left hemiscrotum remains enlarged and indurated, scrotal skin with pitting edema, no skin breakdown or purulence, no crepitus or eschar noted    Musculoskeletal: No lower extremity edema bilaterally, no clubbing or cyanosis to extremities   Psychiatric: Appropriate affect, cooperative   Neurologic: Oriented x 3,  Cranial Nerves grossly intact, speech clear   Skin: No rashes     Result Review    Result Review:  I have personally reviewed the results from the time of this admission to 2023 10:19 EST and agree with these findings:  [x]  Laboratory  [x]  Microbiology  []  Radiology  []   EKG/Telemetry   []  Cardiology/Vascular   []  Pathology  []  Old records  []  Other:    Most notable findings include: WBC 42 from 47, afebrile, cultures pending    Assessment & Plan   Assessment / Plan     Brief Patient Summary:  Cj Cifuentes is a 72 y.o. male who underwent robotic simple prostatectomy for refractory BPH and urinary retention on 12/23/22, procedure was uncomplicated.  Post op catheter has remained in place, he developed a severe left epididymoorchitis with significant leukocytosis and lactic acidosis. Cultures are pending. WBC improving slowly on Vanc/Zosyn.     Catheter exchanged in ER. Discussed with patient if continued improvement, may remove his catheter while admitted. Defer to Dr Stinson, treating surgeon, tomorrow.       Active Hospital Problems:  Active Hospital Problems    Diagnosis    • **Sepsis, due to unspecified organism, unspecified whether acute organ dysfunction present ([facility])    • Essential hypertension    • Type 2 diabetes mellitus with hyperglycemia, without long-term current use of insulin ([facility])    • Obesity (BMI 30-39.9)    • Hernia, abdominal    • ÁNGEL (acute kidney injury) ([facility])        Plan:   - cont broad spectrum abx, f/u cultures  - catheter plan per Dr Stinson 1/3/23   - no obvious concern for Dawson's gangrene at this time, appears to be an isolated severe left epididymoorchitis         DVT prophylaxis:  Medical DVT prophylaxis orders are present.    CODE STATUS:   Code Status (Patient has no pulse and is not breathing): CPR (Attempt to Resuscitate)  Medical Interventions (Patient has pulse or is breathing): Full Support    Disposition:  I expect patient to remain admitted.    Electronically signed by James Bingham MD, 01/02/23, 10:19 AM EST.

## 2023-01-02 NOTE — PROGRESS NOTES
"Pharmacy Consult-Vancomycin Dosing  Cj Cifuentes is a  72 y.o. male receiving vancomycin therapy.     Indication: Sepsis (urinary source, recent prostatectomy)  Consulting Provider: Hospitalist   ID Consult: N    Goal AUC: 400 - 600 mg/L*hr    Current Antimicrobial Therapy  Anti-Infectives (From admission, onward)      Ordered     Dose/Rate Route Frequency Start Stop    01/01/23 2006  piperacillin-tazobactam (ZOSYN) 3.375 g in iso-osmotic dextrose 50 ml (premix)        Ordering Provider: Becky Gamble APRN    3.375 g  over 4 Hours Intravenous Every 8 Hours 01/02/23 2100 01/07/23 2059 01/01/23 2006  Pharmacy to dose vancomycin        Ordering Provider: Becky Gamble APRN     Does not apply Continuous PRN 01/01/23 2006 01/06/23 2005 01/01/23 1324  vancomycin 2000 mg/500 mL 0.9% NS IVPB ([facility])        Ordering Provider: Olayinka Arriaga DO    20 mg/kg × 105 kg Intravenous Once 01/01/23 1326 01/01/23 1944    01/01/23 1324  piperacillin-tazobactam (ZOSYN) 3.375 g in iso-osmotic dextrose 50 ml (premix)        Ordering Provider: Olayinka Arriaga DO    3.375 g  over 30 Minutes Intravenous Once 01/01/23 1326 01/01/23 1728          Allergies  Allergies as of 01/01/2023    (No Known Allergies)     Labs  Results from last 7 days   Lab Units 01/01/23  1322   BUN mg/dL 13   CREATININE mg/dL 1.09     Results from last 7 days   Lab Units 01/01/23  1322   WBC 10*3/mm3 47.57*     Evaluation of Dosing     Last Dose Received in the ED/Outside Facility: vancomycin 2000mg (~19mg/kg) IV on 1/1 @ 1738.   Is Patient on Dialysis or Renal Replacement: No, but pt w/ ÁNGEL on admission (SCr 1.09, baseline ~0.75)    Ht - 175.3 cm (69\")  Wt - 105 kg (232 lb)    Estimated Creatinine Clearance: 75.3 mL/min (by C-G formula based on SCr of 1.09 mg/dL).    Intake & Output (last 3 days)       None          Microbiology and Radiology  Microbiology Results (last 10 days)       Procedure Component Value - Date/Time    COVID " PRE-OP / PRE-PROCEDURE SCREENING ORDER (NO ISOLATION) - Swab, Nasopharynx [[id number]]  (Normal) Collected: 01/01/23 1325    Lab Status: Final result Specimen: Swab from Nasopharynx Updated: 01/01/23 6191    Narrative:      The following orders were created for panel order COVID PRE-OP / PRE-PROCEDURE SCREENING ORDER (NO ISOLATION) - Swab, Nasopharynx.  Procedure                               Abnormality         Status                     ---------                               -----------         ------                     COVID-19 and FLU A/B PCR...[[id number]]  Normal              Final result                 Please view results for these tests on the individual orders.    COVID-19 and FLU A/B PCR - Swab, Nasopharynx [[id number]]  (Normal) Collected: 01/01/23 1325    Lab Status: Final result Specimen: Swab from Nasopharynx Updated: 01/01/23 5047     COVID19 Not Detected     Influenza A PCR Not Detected     Influenza B PCR Not Detected    Narrative:      Fact sheet for providers: https://www.fda.gov/media/014976/download    Fact sheet for patients: https://www.fda.gov/media/886907/download    Test performed by PCR.          Reported Vancomycin Levels                 [facility] AUC Calculation:    Current AUC: -- mg/L*hr    Predicted Steady State AUC on Current Dose: -- mg/L*hr  _________________________________    Predicted Steady State AUC on New Dose:  -- mg/L*hr    Assessment/Plan:  Pharmacy to dose vancomycin for sepsis in setting of recent prostatectomy. Goal -600 mg/L*hr. Of note: pt with ÁNGEL on admission, so not a good fit for [facility] until renal function stabilizes.   Patient received loading dose of vancomycin 2000mg (~19mg/kg) IV on 1/1 @ 1738.   Due to ÁNGEL on admission, will assess clearance by random level on 1/2 @ 0600, prior to scheduling maintenance regimen.   Pharmacy will continue to monitor renal function, cultures and sensitivities, and clinical status to adjust regimen as  necessary.    Taylor Riedel, PharmD, [facility]  1/1/2023  20:12 EST

## 2023-01-02 NOTE — PROGRESS NOTES
Pharmacy Consult-Vancomycin Dosing  Cj Cifuentes is a  72 y.o. male receiving vancomycin therapy.     Indication: Sepsis (urinary source, recent prostatectomy)  Consulting Provider: Hospitalist   ID Consult: N    Goal AUC: 400 - 600 mg/L*hr    Current Antimicrobial Therapy  Anti-Infectives (From admission, onward)      Ordered     Dose/Rate Route Frequency Start Stop    01/01/23 2006  piperacillin-tazobactam (ZOSYN) 3.375 g in iso-osmotic dextrose 50 ml (premix)        Ordering Provider: Becky Gamble APRN    3.375 g  over 4 Hours Intravenous Every 8 Hours 01/02/23 2100 01/07/23 2059    01/02/23 1145  vancomycin 1500 mg/500 mL 0.9% NS IVPB ([facility])        Ordering Provider: Gary Jaquez RPH    1,500 mg  over 90 Minutes Intravenous Every 12 Hours Scheduled 01/02/23 1245 01/09/23 0959    01/01/23 2032  vancomycin (dosing per levels)        Ordering Provider: Becky Gamble APRN     Does not apply Daily 01/02/23 0900 01/06/23 0859    01/01/23 2006  Pharmacy to dose vancomycin        Ordering Provider: Becky Gamble APRN     Does not apply Continuous PRN 01/01/23 2006 01/06/23 2005 01/01/23 1324  vancomycin 2000 mg/500 mL 0.9% NS IVPB ([facility])        Ordering Provider: Olayinka Arriaga DO    20 mg/kg × 105 kg Intravenous Once 01/01/23 1326 01/01/23 1944    01/01/23 1324  piperacillin-tazobactam (ZOSYN) 3.375 g in iso-osmotic dextrose 50 ml (premix)        Ordering Provider: Olayinka Arriaga DO    3.375 g  over 30 Minutes Intravenous Once 01/01/23 1326 01/01/23 1728          Allergies  Allergies as of 01/01/2023    (No Known Allergies)     Labs  Results from last 7 days   Lab Units 01/02/23  0810 01/01/23  1322   BUN mg/dL 10 13   CREATININE mg/dL 0.72* 1.09     Results from last 7 days   Lab Units 01/02/23  0810 01/01/23  1322   WBC 10*3/mm3 42.32* 47.57*     Evaluation of Dosing     Last Dose Received in the ED/Outside Facility: vancomycin 2000mg (~19mg/kg) IV on 1/1 @ 1738.   Is  "Patient on Dialysis or Renal Replacement: No, but pt w/ ÁNGEL on admission (SCr 1.09, baseline ~0.75)    Ht - 175.3 cm (69\")  Wt - 112 kg (246 lb 4.8 oz)    Estimated Creatinine Clearance: 117.7 mL/min (A) (by C-G formula based on SCr of 0.72 mg/dL (L)).    Intake & Output (last 3 days)         12/30 0701 12/31 0700 12/31 0701 01/01 0700 01/01 0701 01/02 0700 01/02 0701 01/03 0700    P.O.    240    Total Intake(mL/kg)    240 (2.1)    Urine (mL/kg/hr)   2050     Total Output   2050     Net   -2050 +240                  Microbiology and Radiology  Microbiology Results (last 10 days)       Procedure Component Value - Date/Time    MRSA Screen, PCR (Inpatient) - Swab, Nares [[id number]]  (Normal) Collected: 01/02/23 0102    Lab Status: Final result Specimen: Swab from Nares Updated: 01/02/23 0910     MRSA PCR Negative    Narrative:      The negative predictive value of this diagnostic test is high and should only be used to consider de-escalating anti-MRSA therapy. A positive result may indicate colonization with MRSA and must be correlated clinically.  MRSA Negative    COVID PRE-OP / PRE-PROCEDURE SCREENING ORDER (NO ISOLATION) - Swab, Nasopharynx [[id number]]  (Normal) Collected: 01/01/23 1325    Lab Status: Final result Specimen: Swab from Nasopharynx Updated: 01/01/23 1359    Narrative:      The following orders were created for panel order COVID PRE-OP / PRE-PROCEDURE SCREENING ORDER (NO ISOLATION) - Swab, Nasopharynx.  Procedure                               Abnormality         Status                     ---------                               -----------         ------                     COVID-19 and FLU A/B PCR...[[id number]]  Normal              Final result                 Please view results for these tests on the individual orders.    COVID-19 and FLU A/B PCR - Swab, Nasopharynx [[id number]]  (Normal) Collected: 01/01/23 1325    Lab Status: Final result Specimen: Swab from Nasopharynx Updated: 01/01/23 2597    "  COVID19 Not Detected     Influenza A PCR Not Detected     Influenza B PCR Not Detected    Narrative:      Fact sheet for providers: https://www.fda.gov/media/592679/download    Fact sheet for patients: https://www.fda.gov/media/123948/download    Test performed by PCR.          Reported Vancomycin Levels  Results from last 7 days   Lab Units 01/02/23  0810   VANCOMYCIN RM mcg/mL 4.20*                [employer] AUC Calculation:    Current AUC: 198 mg/L*hr    Predicted Steady State AUC on Current Dose: 488 mg/L*hr  _________________________________    Predicted Steady State AUC on New Dose:  488 mg/L*hr    Assessment/Plan:  Pharmacy to dose vancomycin for sepsis in setting of recent prostatectomy. Goal -600 mg/L*hr.   Patient received loading dose of vancomycin 2000mg (~19mg/kg) IV on 1/1 @ 1738.   Renal function has returned to baseline based on labs from today.   MRSA PCR returned negative, but infection likely has a urinary source. Would wait until cultures finalize before de-escalating therapy. WBC still highly elevated this morning.   Will start patient on a maintenance regimen of vancomycin 1500 mg IV q12h (~13 mg/kg)   Plan to evaluate vancomycin random level 1/3 @ 0600 to confirm that renal function remains stable.   Pharmacy will continue to monitor renal function, cultures and sensitivities, and clinical status to adjust regimen as necessary.    Gary Jaquez,  [place]  01/02/23  11:47 EST

## 2023-01-02 NOTE — PLAN OF CARE
Goal Outcome Evaluation:  Plan of Care Reviewed With: patient        Progress: no change  Outcome Evaluation: PT eval complete. Pt performed bed mobility with SBA. Pt performed STS and amb 400' with FWW and CGA. No LOB noted. Activity limited by fatigue and elevated pain. Recommend d/c home with assist when medically appropriate. Plan to trial no AD next session to progress to PLOF.

## 2023-01-02 NOTE — PLAN OF CARE
Goal Outcome Evaluation:  Plan of Care Reviewed With: patient        Progress: no change  Outcome Evaluation: Patient arrived on the unit tonight with signs and symptoms related to sepsis and UTI. Patient has had a decent shift, sleeping on and off tonight. VSS, and patient has been alert and oriented times four. No complaints of pain since arriving on the unit. Nursing staff will continue to monitor and assess the patient.

## 2023-01-02 NOTE — PROGRESS NOTES
[facility] Medicine Services  PROGRESS NOTE    Patient Name: Cj Cifuentes  : 1950  MRN: [id number]    Date of Admission: 2023  Primary Care Physician: Gary Kaur MD    Subjective   Subjective     CC:  F/U sepsis due to UTI    HPI:  He is feeling better today so far.    ROS:  Gen: + fever  GI- No N/V/D, abd pain      Objective   Objective     Vital Signs:   Temp:  [98.4 °F (36.9 °C)-103.1 °F (39.5 °C)] 98.8 °F (37.1 °C)  Heart Rate:  [] 94  Resp:  [16-20] 16  BP: (107-141)/(63-91) 141/73  Flow (L/min):  [2] 2     Physical Exam:  Constitutional: No acute distress, awake, alert, laying in bed  HENT: NCAT, mucous membranes moist  Respiratory: Respiratory effort normal   Cardiovascular: RRR  Gastrointestinal: Soft, nontender, nondistended  Musculoskeletal: No bilateral ankle edema  Psychiatric: Appropriate affect, cooperative  Neurologic: Speech clear  Skin: No rashes on exposed surfaces    Results Reviewed:  LAB RESULTS:      Lab 23  0810 23  1828 23  1322   WBC 42.32*  --  47.57*   HEMOGLOBIN 10.7*  --  12.9*   HEMATOCRIT 32.2*  --  37.6   PLATELETS 301  --  333   NEUTROS ABS 38.24*  --  43.18*   IMMATURE GRANS (ABS) 0.91*  --  1.74*   LYMPHS ABS 1.02  --  0.50*   MONOS ABS 2.00*  --  2.10*   EOS ABS 0.01  --  0.01   MCV 87.3  --  87.0   PROCALCITONIN  --   --  6.79*   LACTATE  --  4.3* 4.2*         Lab 23  0810 23  1322   SODIUM 134* 133*   POTASSIUM 3.6 3.6   CHLORIDE 100 94*   CO2 20.0* 21.0*   ANION GAP 14.0 18.0*   BUN 10 13   CREATININE 0.72* 1.09   EGFR 98.3 73.0   GLUCOSE 175* 285*   CALCIUM 8.6 9.2   MAGNESIUM 1.5*  --          Lab 23  1322   TOTAL PROTEIN 7.4   ALBUMIN 3.7   GLOBULIN 3.7   ALT (SGPT) 39   AST (SGOT) 24   BILIRUBIN 1.6*   ALK PHOS 128*                     Brief Urine Lab Results  (Last result in the past 365 days)      Color   Clarity   Blood   Leuk Est   Nitrite   Protein   CREAT   Urine HCG         01/01/23 1735 Yellow   Cloudy   Large (3+)   Large (3+)   Negative   30 mg/dL (1+)                 Microbiology Results Abnormal     Procedure Component Value - Date/Time    MRSA Screen, PCR (Inpatient) - Swab, Nares [[id number]]  (Normal) Collected: 01/02/23 0102    Lab Status: Final result Specimen: Swab from Nares Updated: 01/02/23 0910     MRSA PCR Negative    Narrative:      The negative predictive value of this diagnostic test is high and should only be used to consider de-escalating anti-MRSA therapy. A positive result may indicate colonization with MRSA and must be correlated clinically.  MRSA Negative    COVID PRE-OP / PRE-PROCEDURE SCREENING ORDER (NO ISOLATION) - Swab, Nasopharynx [[id number]]  (Normal) Collected: 01/01/23 1325    Lab Status: Final result Specimen: Swab from Nasopharynx Updated: 01/01/23 1357    Narrative:      The following orders were created for panel order COVID PRE-OP / PRE-PROCEDURE SCREENING ORDER (NO ISOLATION) - Swab, Nasopharynx.  Procedure                               Abnormality         Status                     ---------                               -----------         ------                     COVID-19 and FLU A/B PCR...[[id number]]  Normal              Final result                 Please view results for these tests on the individual orders.    COVID-19 and FLU A/B PCR - Swab, Nasopharynx [[id number]]  (Normal) Collected: 01/01/23 1325    Lab Status: Final result Specimen: Swab from Nasopharynx Updated: 01/01/23 1357     COVID19 Not Detected     Influenza A PCR Not Detected     Influenza B PCR Not Detected    Narrative:      Fact sheet for providers: https://www.fda.gov/media/522318/download    Fact sheet for patients: https://www.fda.gov/media/116069/download    Test performed by PCR.          US Scrotum & Testicles    Result Date: 1/1/2023  DATE OF EXAM: 1/1/2023 2:00 PM  PROCEDURE: US SCROTUM AND TESTICLES-  INDICATIONS: Bilateral testicular swelling, left testicular  pain, redness, recent urological prostate surgery with indwelling catheter  COMPARISON: No comparisons available.  TECHNIQUE: Multiple sonographic images of the scrotum were obtained in transverse and longitudinal planes. Grayscale and color Doppler duplex techniques were utilized.  FINDINGS: The right testicle measures 4.1 x 3.1 x 4.2 cm. There are small surrounding hydrocele. Unremarkable 13 mm epididymis.  The left testicle measures 5.0 x 3.7 x 3.9 cm. There is surrounding complex, partially echogenic and septated hydrocele present, concerning for superimposed infection, pyocele. Hyperemic 18 mm epididymis.      Impression: Mildly enlarged and hyperemic left epididymis concerning for epididymitis, with concurrent complex, septated and debris filled hydrocele on the left, concerning for component of infectious pyocele.  This report was finalized on 1/1/2023 3:07 PM by Naldo Silveira.      CT Abdomen Pelvis With Contrast    Result Date: 1/1/2023  DATE OF EXAM: 1/1/2023 3:04 PM  PROCEDURE: CT ABDOMEN PELVIS W CONTRAST-  INDICATIONS: Fever, recent prostate surgery, groin and testicular pain and swelling.  Please scan through the groin/testicle region.  COMPARISON: 10/16/2022  TECHNIQUE: Routine transaxial slices were obtained through the abdomen and pelvis after the intravenous administration of 75 mL of Isovue 300. Reconstructed coronal and sagittal images were also obtained. Automated exposure control and iterative construction methods were used.  The radiation dose reduction device was turned on for each scan per the ALARA (As Low as Reasonably Achievable) protocol.  FINDINGS: The lung bases are grossly clear. Evaluation of the body wall soft tissues demonstrates a fat-containing ventral body wall hernia. The scrotal soft tissues are better evaluated on same-day ultrasound, however there are similar findings including a complex left-sided hydrocele, concerning for pyocele. There also appears to be a left-sided  varicocele. The liver, spleen, bilateral adrenal glands demonstrate homogeneous enhancement without evidence of suspicious focal lesion. There is a fluid attenuating finding along the distal pancreatic body measuring 2.2 x 1.3 cm. Unremarkable gallbladder. Nonobstructing renal calculi are noted on the right and there is a large exophytic left renal cyst. The kidneys are otherwise normal. Small and large bowel loops are nondilated. The appendix is normal. There is no free fluid or pneumoperitoneum. Edematous changes are noted in the pelvis, somewhat nonspecific in the reported recent postoperative setting, with evidence of prior prostatectomy. Grossly unremarkable urinary bladder.      Impression: Evaluation of the scrotum demonstrate similar findings to same-day ultrasound, with bilateral hydroceles present, appearing somewhat hyperdense on the left, better characterized on ultrasound and concerning for pyocele. The left epididymis also appears enlarged and there is a left-sided varicocele.  Some nonspecific edema is seen within the pelvis, likely relating to reported recent history of prostatectomy.  Incidentally noted 2.2 cm cystic finding of the pancreatic body. Consider nonemergent pancreatic protocol MRI when able.  This report was finalized on 1/1/2023 4:09 PM by Naldo Silveira.            I have reviewed the medications:  Scheduled Meds:acetaminophen, 1,000 mg, Oral, Q8H  atorvastatin, 20 mg, Oral, Nightly  docusate sodium, 100 mg, Oral, BID  heparin (porcine), 5,000 Units, Subcutaneous, Q8H  insulin lispro, 0-7 Units, Subcutaneous, TID AC  levothyroxine, 50 mcg, Oral, Q AM  piperacillin-tazobactam, 3.375 g, Intravenous, Q8H  senna-docusate sodium, 2 tablet, Oral, BID  sodium chloride, 10 mL, Intravenous, Q12H  tamsulosin, 0.4 mg, Oral, Nightly  vancomycin (dosing per levels), , Does not apply, Daily      Continuous Infusions:Pharmacy to dose vancomycin,   sodium chloride 0.9 % with KCl 20 mEq, 125 mL/hr,  Last Rate: 125 mL/hr (01/02/23 0557)      PRN Meds:.•  senna-docusate sodium **AND** polyethylene glycol **AND** bisacodyl **AND** bisacodyl  •  cyclobenzaprine  •  dextrose  •  dextrose  •  glucagon (human recombinant)  •  HYDROmorphone  •  hyoscyamine sulfate  •  melatonin  •  ondansetron  •  oxybutynin  •  oxyCODONE  •  Pharmacy to dose vancomycin  •  phenazopyridine  •  [COMPLETED] Insert Peripheral IV **AND** sodium chloride  •  sodium chloride  •  sodium chloride    Assessment & Plan   Assessment & Plan     Active Hospital Problems    Diagnosis  POA   • **Sepsis, due to unspecified organism, unspecified whether acute organ dysfunction present (HCC) [A41.9]  Yes   • Essential hypertension [I10]  Yes   • Type 2 diabetes mellitus with hyperglycemia, without long-term current use of insulin (HCC) [E11.65]  Yes   • Obesity (BMI 30-39.9) [E66.9]  Yes   • Hernia, abdominal [K46.9]  Yes   • ÁNGEL (acute kidney injury) (HCC) [N17.9]  Yes      Resolved Hospital Problems   No resolved problems to display.        Brief Hospital Course to date:  Cj Cifuentes is a 72 y.o. male with history of BPH, acute urinary retention, DM2, hypothyroidism, HTN, HLD GERD and sleep apnea who had a recent prostatectomy and presented to the ED due to fever and was found to have sepsis due to UTI.    This patient's problems and plans were partially entered by my partner and updated as appropriate by me 01/02/23.    Sepsis due to UTI  -Continue broad spectrum antibiotics pending cultures  -IV fluid resuscitation  -Urology following    DM2  -A1c 7.0%  -SSI    ÁNGEL, mild  -Resolved with IV fluids  -Baseline creatinine 0.75, was 1.09 on admission    Hypothyroidism  -Continue synthroid    Expected Discharge Location and Transportation: Home  Expected Discharge   Expected Discharge Date and Time     Expected Discharge Date Expected Discharge Time    Jan 4, 2023            DVT prophylaxis:  Medical DVT prophylaxis orders are present.     AM-PAC 6  Clicks Score (PT): 14 (01/01/23 2010)    CODE STATUS:   Code Status and Medical Interventions:   Ordered at: 01/01/23 1801     Code Status (Patient has no pulse and is not breathing):    CPR (Attempt to Resuscitate)     Medical Interventions (Patient has pulse or is breathing):    Full Support       Cherise Hopkins MD  01/02/23

## 2023-01-03 ENCOUNTER — APPOINTMENT (OUTPATIENT)
Dept: GENERAL RADIOLOGY | Facility: HOSPITAL | Age: 73
End: 2023-01-03
Payer: MEDICARE

## 2023-01-03 LAB
ANION GAP SERPL CALCULATED.3IONS-SCNC: 10 MMOL/L (ref 5–15)
BASOPHILS # BLD AUTO: 0.05 10*3/MM3 (ref 0–0.2)
BASOPHILS NFR BLD AUTO: 0.2 % (ref 0–1.5)
BUN SERPL-MCNC: 11 MG/DL (ref 8–23)
BUN/CREAT SERPL: 19.6 (ref 7–25)
CALCIUM SPEC-SCNC: 8.3 MG/DL (ref 8.6–10.5)
CHLORIDE SERPL-SCNC: 103 MMOL/L (ref 98–107)
CO2 SERPL-SCNC: 22 MMOL/L (ref 22–29)
CREAT SERPL-MCNC: 0.56 MG/DL (ref 0.76–1.27)
DEPRECATED RDW RBC AUTO: 42.4 FL (ref 37–54)
EGFRCR SERPLBLD CKD-EPI 2021: 106 ML/MIN/1.73
EOSINOPHIL # BLD AUTO: 0.09 10*3/MM3 (ref 0–0.4)
EOSINOPHIL NFR BLD AUTO: 0.3 % (ref 0.3–6.2)
ERYTHROCYTE [DISTWIDTH] IN BLOOD BY AUTOMATED COUNT: 13.4 % (ref 12.3–15.4)
GLUCOSE BLDC GLUCOMTR-MCNC: 160 MG/DL (ref 70–130)
GLUCOSE BLDC GLUCOMTR-MCNC: 190 MG/DL (ref 70–130)
GLUCOSE BLDC GLUCOMTR-MCNC: 198 MG/DL (ref 70–130)
GLUCOSE SERPL-MCNC: 171 MG/DL (ref 65–99)
HCT VFR BLD AUTO: 28.7 % (ref 37.5–51)
HGB BLD-MCNC: 9.8 G/DL (ref 13–17.7)
IMM GRANULOCYTES # BLD AUTO: 0.84 10*3/MM3 (ref 0–0.05)
IMM GRANULOCYTES NFR BLD AUTO: 2.8 % (ref 0–0.5)
LYMPHOCYTES # BLD AUTO: 1 10*3/MM3 (ref 0.7–3.1)
LYMPHOCYTES NFR BLD AUTO: 3.3 % (ref 19.6–45.3)
MCH RBC QN AUTO: 29.2 PG (ref 26.6–33)
MCHC RBC AUTO-ENTMCNC: 34.1 G/DL (ref 31.5–35.7)
MCV RBC AUTO: 85.4 FL (ref 79–97)
MONOCYTES # BLD AUTO: 0.91 10*3/MM3 (ref 0.1–0.9)
MONOCYTES NFR BLD AUTO: 3 % (ref 5–12)
NEUTROPHILS NFR BLD AUTO: 27.57 10*3/MM3 (ref 1.7–7)
NEUTROPHILS NFR BLD AUTO: 90.4 % (ref 42.7–76)
NRBC BLD AUTO-RTO: 0 /100 WBC (ref 0–0.2)
PLATELET # BLD AUTO: 291 10*3/MM3 (ref 140–450)
PMV BLD AUTO: 9.1 FL (ref 6–12)
POTASSIUM SERPL-SCNC: 3.8 MMOL/L (ref 3.5–5.2)
RBC # BLD AUTO: 3.36 10*6/MM3 (ref 4.14–5.8)
SODIUM SERPL-SCNC: 135 MMOL/L (ref 136–145)
VANCOMYCIN SERPL-MCNC: 10.5 MCG/ML (ref 5–40)
WBC NRBC COR # BLD: 30.46 10*3/MM3 (ref 3.4–10.8)

## 2023-01-03 PROCEDURE — 80202 ASSAY OF VANCOMYCIN: CPT

## 2023-01-03 PROCEDURE — 85025 COMPLETE CBC W/AUTO DIFF WBC: CPT | Performed by: INTERNAL MEDICINE

## 2023-01-03 PROCEDURE — 82962 GLUCOSE BLOOD TEST: CPT

## 2023-01-03 PROCEDURE — 99232 SBSQ HOSP IP/OBS MODERATE 35: CPT | Performed by: INTERNAL MEDICINE

## 2023-01-03 PROCEDURE — 25010000002 HEPARIN (PORCINE) PER 1000 UNITS: Performed by: NURSE PRACTITIONER

## 2023-01-03 PROCEDURE — G0378 HOSPITAL OBSERVATION PER HR: HCPCS

## 2023-01-03 PROCEDURE — 25010000002 PIPERACILLIN SOD-TAZOBACTAM PER 1 G: Performed by: NURSE PRACTITIONER

## 2023-01-03 PROCEDURE — 80048 BASIC METABOLIC PNL TOTAL CA: CPT | Performed by: INTERNAL MEDICINE

## 2023-01-03 PROCEDURE — 99232 SBSQ HOSP IP/OBS MODERATE 35: CPT | Performed by: UROLOGY

## 2023-01-03 PROCEDURE — 97530 THERAPEUTIC ACTIVITIES: CPT

## 2023-01-03 PROCEDURE — 93010 ELECTROCARDIOGRAM REPORT: CPT | Performed by: INTERNAL MEDICINE

## 2023-01-03 PROCEDURE — 63710000001 INSULIN LISPRO (HUMAN) PER 5 UNITS: Performed by: NURSE PRACTITIONER

## 2023-01-03 PROCEDURE — 93005 ELECTROCARDIOGRAM TRACING: CPT | Performed by: INTERNAL MEDICINE

## 2023-01-03 RX ADMIN — ACETAMINOPHEN 1000 MG: 500 TABLET, FILM COATED ORAL at 04:00

## 2023-01-03 RX ADMIN — Medication 10 ML: at 05:13

## 2023-01-03 RX ADMIN — ACETAMINOPHEN 1000 MG: 500 TABLET, FILM COATED ORAL at 16:08

## 2023-01-03 RX ADMIN — HEPARIN SODIUM 5000 UNITS: 5000 INJECTION INTRAVENOUS; SUBCUTANEOUS at 05:04

## 2023-01-03 RX ADMIN — OXYCODONE 5 MG: 5 TABLET ORAL at 13:32

## 2023-01-03 RX ADMIN — DOCUSATE SODIUM 100 MG: 100 CAPSULE, LIQUID FILLED ORAL at 08:43

## 2023-01-03 RX ADMIN — SENNOSIDES AND DOCUSATE SODIUM 2 TABLET: 50; 8.6 TABLET ORAL at 21:10

## 2023-01-03 RX ADMIN — ACETAMINOPHEN 1000 MG: 500 TABLET, FILM COATED ORAL at 21:09

## 2023-01-03 RX ADMIN — HEPARIN SODIUM 5000 UNITS: 5000 INJECTION INTRAVENOUS; SUBCUTANEOUS at 21:10

## 2023-01-03 RX ADMIN — INSULIN LISPRO 2 UNITS: 100 INJECTION, SOLUTION INTRAVENOUS; SUBCUTANEOUS at 08:53

## 2023-01-03 RX ADMIN — TAZOBACTAM SODIUM AND PIPERACILLIN SODIUM 3.38 G: 375; 3 INJECTION, SOLUTION INTRAVENOUS at 21:10

## 2023-01-03 RX ADMIN — Medication 10 ML: at 21:10

## 2023-01-03 RX ADMIN — SENNOSIDES AND DOCUSATE SODIUM 2 TABLET: 50; 8.6 TABLET ORAL at 08:43

## 2023-01-03 RX ADMIN — TAZOBACTAM SODIUM AND PIPERACILLIN SODIUM 3.38 G: 375; 3 INJECTION, SOLUTION INTRAVENOUS at 05:04

## 2023-01-03 RX ADMIN — INSULIN LISPRO 2 UNITS: 100 INJECTION, SOLUTION INTRAVENOUS; SUBCUTANEOUS at 12:30

## 2023-01-03 RX ADMIN — DOCUSATE SODIUM 100 MG: 100 CAPSULE, LIQUID FILLED ORAL at 21:09

## 2023-01-03 RX ADMIN — INSULIN LISPRO 2 UNITS: 100 INJECTION, SOLUTION INTRAVENOUS; SUBCUTANEOUS at 16:53

## 2023-01-03 RX ADMIN — TAMSULOSIN HYDROCHLORIDE 0.4 MG: 0.4 CAPSULE ORAL at 21:10

## 2023-01-03 RX ADMIN — LEVOTHYROXINE SODIUM 50 MCG: 50 TABLET ORAL at 05:04

## 2023-01-03 RX ADMIN — ATORVASTATIN CALCIUM 20 MG: 20 TABLET, FILM COATED ORAL at 21:10

## 2023-01-03 RX ADMIN — HEPARIN SODIUM 5000 UNITS: 5000 INJECTION INTRAVENOUS; SUBCUTANEOUS at 16:08

## 2023-01-03 RX ADMIN — Medication 5 MG: at 21:09

## 2023-01-03 RX ADMIN — TAZOBACTAM SODIUM AND PIPERACILLIN SODIUM 3.38 G: 375; 3 INJECTION, SOLUTION INTRAVENOUS at 16:08

## 2023-01-03 NOTE — PROGRESS NOTES
[facility] Medicine Services  PROGRESS NOTE    Patient Name: Cj Cifuentes  : 1950  MRN: [id number]    Date of Admission: 2023  Primary Care Physician: Gary Kaur MD    Subjective   Subjective     CC:  F/U sepsis due to UTI    HPI:  Catheter is out.  He is having some urine leaking since removal.    ROS:  Gen: No fever  : As above      Objective   Objective     Vital Signs:   Temp:  [97.7 °F (36.5 °C)-100.3 °F (37.9 °C)] 98 °F (36.7 °C)  Heart Rate:  [] 79  Resp:  [16-20] 18  BP: (127-154)/(74-96) 137/78  Flow (L/min):  [2] 2     Physical Exam:  Constitutional: No acute distress, awake, alert, sitting up in chair  HENT: NCAT, mucous membranes moist  Respiratory: Respiratory effort normal, CTAB   Cardiovascular: RRR, no murmurs  Gastrointestinal: Nondistended  Musculoskeletal: No bilateral ankle edema  Psychiatric: Appropriate affect, cooperative  Neurologic: Speech clear  Skin: No rashes on exposed surfaces    Results Reviewed:  LAB RESULTS:      Lab 23  0602 23  0810 23  1828 23  1322   WBC 30.46* 42.32*  --  47.57*   HEMOGLOBIN 9.8* 10.7*  --  12.9*   HEMATOCRIT 28.7* 32.2*  --  37.6   PLATELETS 291 301  --  333   NEUTROS ABS 27.57* 38.24*  --  43.18*   IMMATURE GRANS (ABS) 0.84* 0.91*  --  1.74*   LYMPHS ABS 1.00 1.02  --  0.50*   MONOS ABS 0.91* 2.00*  --  2.10*   EOS ABS 0.09 0.01  --  0.01   MCV 85.4 87.3  --  87.0   PROCALCITONIN  --   --   --  6.79*   LACTATE  --   --  4.3* 4.2*         Lab 23  0602 23  0810 23  1322   SODIUM 135* 134* 133*   POTASSIUM 3.8 3.6 3.6   CHLORIDE 103 100 94*   CO2 22.0 20.0* 21.0*   ANION GAP 10.0 14.0 18.0*   BUN 11 10 13   CREATININE 0.56* 0.72* 1.09   EGFR 106.0 98.3 73.0   GLUCOSE 171* 175* 285*   CALCIUM 8.3* 8.6 9.2   MAGNESIUM  --  1.5*  --          Lab 23  1322   TOTAL PROTEIN 7.4   ALBUMIN 3.7   GLOBULIN 3.7   ALT (SGPT) 39   AST (SGOT) 24   BILIRUBIN 1.6*   ALK  PHOS 128*                     Brief Urine Lab Results  (Last result in the past 365 days)      Color   Clarity   Blood   Leuk Est   Nitrite   Protein   CREAT   Urine HCG        01/01/23 1735 Yellow   Cloudy   Large (3+)   Large (3+)   Negative   30 mg/dL (1+)                 Microbiology Results Abnormal     Procedure Component Value - Date/Time    Blood Culture - Blood, Hand, Right [[id number]]  (Normal) Collected: 01/01/23 1322    Lab Status: Preliminary result Specimen: Blood from Hand, Right Updated: 01/02/23 1345     Blood Culture No growth at 24 hours    Blood Culture - Blood, Arm, Right [[id number]]  (Normal) Collected: 01/01/23 1322    Lab Status: Preliminary result Specimen: Blood from Arm, Right Updated: 01/02/23 1345     Blood Culture No growth at 24 hours    MRSA Screen, PCR (Inpatient) - Swab, Nares [[id number]]  (Normal) Collected: 01/02/23 0102    Lab Status: Final result Specimen: Swab from Nares Updated: 01/02/23 0910     MRSA PCR Negative    Narrative:      The negative predictive value of this diagnostic test is high and should only be used to consider de-escalating anti-MRSA therapy. A positive result may indicate colonization with MRSA and must be correlated clinically.  MRSA Negative    COVID PRE-OP / PRE-PROCEDURE SCREENING ORDER (NO ISOLATION) - Swab, Nasopharynx [[id number]]  (Normal) Collected: 01/01/23 1325    Lab Status: Final result Specimen: Swab from Nasopharynx Updated: 01/01/23 1353    Narrative:      The following orders were created for panel order COVID PRE-OP / PRE-PROCEDURE SCREENING ORDER (NO ISOLATION) - Swab, Nasopharynx.  Procedure                               Abnormality         Status                     ---------                               -----------         ------                     COVID-19 and FLU A/B PCR...[[id number]]  Normal              Final result                 Please view results for these tests on the individual orders.    COVID-19 and FLU A/B PCR - Swab,  Nasopharynx [[id number]]  (Normal) Collected: 01/01/23 1325    Lab Status: Final result Specimen: Swab from Nasopharynx Updated: 01/01/23 1357     COVID19 Not Detected     Influenza A PCR Not Detected     Influenza B PCR Not Detected    Narrative:      Fact sheet for providers: https://www.fda.gov/media/959902/download    Fact sheet for patients: https://www.fda.gov/media/271944/download    Test performed by PCR.          US Scrotum & Testicles    Result Date: 1/1/2023  DATE OF EXAM: 1/1/2023 2:00 PM  PROCEDURE: US SCROTUM AND TESTICLES-  INDICATIONS: Bilateral testicular swelling, left testicular pain, redness, recent urological prostate surgery with indwelling catheter  COMPARISON: No comparisons available.  TECHNIQUE: Multiple sonographic images of the scrotum were obtained in transverse and longitudinal planes. Grayscale and color Doppler duplex techniques were utilized.  FINDINGS: The right testicle measures 4.1 x 3.1 x 4.2 cm. There are small surrounding hydrocele. Unremarkable 13 mm epididymis.  The left testicle measures 5.0 x 3.7 x 3.9 cm. There is surrounding complex, partially echogenic and septated hydrocele present, concerning for superimposed infection, pyocele. Hyperemic 18 mm epididymis.      Impression: Mildly enlarged and hyperemic left epididymis concerning for epididymitis, with concurrent complex, septated and debris filled hydrocele on the left, concerning for component of infectious pyocele.  This report was finalized on 1/1/2023 3:07 PM by aNldo Silveira.      CT Abdomen Pelvis With Contrast    Result Date: 1/1/2023  DATE OF EXAM: 1/1/2023 3:04 PM  PROCEDURE: CT ABDOMEN PELVIS W CONTRAST-  INDICATIONS: Fever, recent prostate surgery, groin and testicular pain and swelling.  Please scan through the groin/testicle region.  COMPARISON: 10/16/2022  TECHNIQUE: Routine transaxial slices were obtained through the abdomen and pelvis after the intravenous administration of 75 mL of Isovue 300.  Reconstructed coronal and sagittal images were also obtained. Automated exposure control and iterative construction methods were used.  The radiation dose reduction device was turned on for each scan per the ALARA (As Low as Reasonably Achievable) protocol.  FINDINGS: The lung bases are grossly clear. Evaluation of the body wall soft tissues demonstrates a fat-containing ventral body wall hernia. The scrotal soft tissues are better evaluated on same-day ultrasound, however there are similar findings including a complex left-sided hydrocele, concerning for pyocele. There also appears to be a left-sided varicocele. The liver, spleen, bilateral adrenal glands demonstrate homogeneous enhancement without evidence of suspicious focal lesion. There is a fluid attenuating finding along the distal pancreatic body measuring 2.2 x 1.3 cm. Unremarkable gallbladder. Nonobstructing renal calculi are noted on the right and there is a large exophytic left renal cyst. The kidneys are otherwise normal. Small and large bowel loops are nondilated. The appendix is normal. There is no free fluid or pneumoperitoneum. Edematous changes are noted in the pelvis, somewhat nonspecific in the reported recent postoperative setting, with evidence of prior prostatectomy. Grossly unremarkable urinary bladder.      Impression: Evaluation of the scrotum demonstrate similar findings to same-day ultrasound, with bilateral hydroceles present, appearing somewhat hyperdense on the left, better characterized on ultrasound and concerning for pyocele. The left epididymis also appears enlarged and there is a left-sided varicocele.  Some nonspecific edema is seen within the pelvis, likely relating to reported recent history of prostatectomy.  Incidentally noted 2.2 cm cystic finding of the pancreatic body. Consider nonemergent pancreatic protocol MRI when able.  This report was finalized on 1/1/2023 4:09 PM by Naldo Silveira.            I have reviewed the  medications:  Scheduled Meds:acetaminophen, 1,000 mg, Oral, Q8H  atorvastatin, 20 mg, Oral, Nightly  docusate sodium, 100 mg, Oral, BID  heparin (porcine), 5,000 Units, Subcutaneous, Q8H  insulin lispro, 0-7 Units, Subcutaneous, TID AC  levothyroxine, 50 mcg, Oral, Q AM  piperacillin-tazobactam, 3.375 g, Intravenous, Q8H  senna-docusate sodium, 2 tablet, Oral, BID  sodium chloride, 10 mL, Intravenous, Q12H  tamsulosin, 0.4 mg, Oral, Nightly  vancomycin (dosing per levels), , Does not apply, Daily  vancomycin, 1,500 mg, Intravenous, Q12H      Continuous Infusions:Pharmacy to dose vancomycin,       PRN Meds:.•  senna-docusate sodium **AND** polyethylene glycol **AND** bisacodyl **AND** bisacodyl  •  cyclobenzaprine  •  dextrose  •  dextrose  •  glucagon (human recombinant)  •  HYDROmorphone  •  hyoscyamine sulfate  •  magnesium sulfate **OR** magnesium sulfate **OR** magnesium sulfate  •  melatonin  •  ondansetron  •  oxybutynin  •  oxyCODONE  •  Pharmacy to dose vancomycin  •  phenazopyridine  •  potassium chloride **OR** potassium chloride **OR** potassium chloride  •  [COMPLETED] Insert Peripheral IV **AND** sodium chloride  •  sodium chloride  •  sodium chloride    Assessment & Plan   Assessment & Plan     Active Hospital Problems    Diagnosis  POA   • **Sepsis, due to unspecified organism, unspecified whether acute organ dysfunction present (formerly [facility]) [A41.9]  Yes   • Essential hypertension [I10]  Yes   • Type 2 diabetes mellitus with hyperglycemia, without long-term current use of insulin (formerly [facility]) [E11.65]  Yes   • Obesity (BMI 30-39.9) [E66.9]  Yes   • Hernia, abdominal [K46.9]  Yes   • ÁNGEL (acute kidney injury) (formerly [facility]) [N17.9]  Yes      Resolved Hospital Problems   No resolved problems to display.        Brief Hospital Course to date:  Cj Cifuentes is a 72 y.o. male with history of BPH, acute urinary retention, DM2, hypothyroidism, HTN, HLD GERD and sleep apnea who had a recent prostatectomy and presented to the ED  due to fever and was found to have sepsis due to UTI.    This patient's problems and plans were partially entered by my partner and updated as appropriate by me 01/03/23.    Sepsis due to UTI  -Urine culture with pseudomonas.  Continue Zosyn.  D/C vancomycin.  May need ID consult depending on final culture results if not sensitive to fluoroquinolone.  -IV fluid resuscitation  -Urology following    DM2  -A1c 7.0%  -SSI    ÁNGEL, mild  -Resolved with IV fluids  -Baseline creatinine 0.75, was 1.09 on admission    Hypothyroidism  -Continue synthroid    Expected Discharge Location and Transportation: Home  Expected Discharge   Expected Discharge Date and Time     Expected Discharge Date Expected Discharge Time    Jan 4, 2023            DVT prophylaxis:  Medical DVT prophylaxis orders are present.     AM-PAC 6 Clicks Score (PT): 19 (01/02/23 0544)    CODE STATUS:   Code Status and Medical Interventions:   Ordered at: 01/01/23 1801     Code Status (Patient has no pulse and is not breathing):    CPR (Attempt to Resuscitate)     Medical Interventions (Patient has pulse or is breathing):    Full Support       Cherise Hopkins MD  01/03/23
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
## 2023-01-03 NOTE — PLAN OF CARE
Problem: Adult Inpatient Plan of Care  Goal: Plan of Care Review  Outcome: Ongoing, Progressing  Flowsheets (Taken 1/3/2023 0327)  Outcome Evaluation: Patient is A&Ox4, VSS.  Patient has complained of a headache this morning.  No other complaints at this time.  Goal: Patient-Specific Goal (Individualized)  Outcome: Ongoing, Progressing  Goal: Absence of Hospital-Acquired Illness or Injury  Outcome: Ongoing, Progressing  Intervention: Identify and Manage Fall Risk  Recent Flowsheet Documentation  Taken 1/3/2023 0200 by Roselyn Mccarthy, RN  Safety Promotion/Fall Prevention:   assistive device/personal items within reach   clutter free environment maintained   fall prevention program maintained   nonskid shoes/slippers when out of bed   room organization consistent   safety round/check completed  Taken 1/3/2023 0000 by Roselyn Mccarthy, RN  Safety Promotion/Fall Prevention:   assistive device/personal items within reach   clutter free environment maintained   fall prevention program maintained   nonskid shoes/slippers when out of bed   room organization consistent   safety round/check completed  Taken 1/2/2023 2200 by Roselyn Mccarthy, RN  Safety Promotion/Fall Prevention:   assistive device/personal items within reach   clutter free environment maintained   fall prevention program maintained   nonskid shoes/slippers when out of bed   safety round/check completed   room organization consistent  Taken 1/2/2023 2108 by Roselyn Mccarthy, RN  Safety Promotion/Fall Prevention:   assistive device/personal items within reach   clutter free environment maintained   fall prevention program maintained   nonskid shoes/slippers when out of bed   room organization consistent   safety round/check completed  Taken 1/2/2023 2015 by Roselyn Mccarthy, RN  Safety Promotion/Fall Prevention:   assistive device/personal items within reach   clutter free environment maintained   fall prevention program maintained   nonskid shoes/slippers when out of bed    room organization consistent   safety round/check completed  Intervention: Prevent Skin Injury  Recent Flowsheet Documentation  Taken 1/3/2023 0200 by Roselyn Mccarthy RN  Body Position: position changed independently  Skin Protection:   adhesive use limited   incontinence pads utilized   protective footwear used   tubing/devices free from skin contact  Taken 1/3/2023 0000 by Roselyn Mccarthy RN  Body Position: position changed independently  Taken 1/2/2023 2200 by Roselyn Mccarthy RN  Body Position: position changed independently  Skin Protection:   adhesive use limited   incontinence pads utilized   protective footwear used   transparent dressing maintained   tubing/devices free from skin contact  Taken 1/2/2023 2108 by Roselyn Mccarthy RN  Body Position: position changed independently  Skin Protection:   adhesive use limited   incontinence pads utilized   protective footwear used   transparent dressing maintained   skin-to-skin areas padded   tubing/devices free from skin contact  Taken 1/2/2023 2015 by Roselyn Mccarthy RN  Body Position: position changed independently  Skin Protection:   adhesive use limited   incontinence pads utilized   protective footwear used   transparent dressing maintained   tubing/devices free from skin contact  Intervention: Prevent and Manage VTE (Venous Thromboembolism) Risk  Recent Flowsheet Documentation  Taken 1/3/2023 0200 by Roselyn Mccarthy RN  Activity Management:   activity adjusted per tolerance   activity encouraged  Taken 1/3/2023 0000 by Roselyn Mccarthy RN  Activity Management:   activity adjusted per tolerance   activity encouraged  Taken 1/2/2023 2200 by Roselyn Mccarthy RN  Activity Management:   activity adjusted per tolerance   activity encouraged  Taken 1/2/2023 2108 by Roselyn Mccarthy RN  Activity Management:   activity adjusted per tolerance   activity encouraged  VTE Prevention/Management:   bilateral   sequential compression devices off  Range of Motion: active ROM (range of motion)  encouraged  Taken 1/2/2023 2015 by Roselyn Mccarthy, RN  Activity Management:   activity adjusted per tolerance   activity encouraged  Intervention: Prevent Infection  Recent Flowsheet Documentation  Taken 1/3/2023 0200 by Roselyn Mccarthy RN  Infection Prevention:   environmental surveillance performed   hand hygiene promoted   personal protective equipment utilized   rest/sleep promoted   single patient room provided  Taken 1/3/2023 0000 by Roselyn Mccarthy RN  Infection Prevention:   environmental surveillance performed   hand hygiene promoted   personal protective equipment utilized   rest/sleep promoted   single patient room provided  Taken 1/2/2023 2200 by Roselyn Mccarthy RN  Infection Prevention:   environmental surveillance performed   hand hygiene promoted   personal protective equipment utilized   rest/sleep promoted   single patient room provided  Taken 1/2/2023 2108 by Roselyn Mccarthy RN  Infection Prevention:   environmental surveillance performed   hand hygiene promoted   personal protective equipment utilized   rest/sleep promoted   single patient room provided  Taken 1/2/2023 2015 by Roselyn Mccarthy RN  Infection Prevention:   environmental surveillance performed   hand hygiene promoted   personal protective equipment utilized   rest/sleep promoted   single patient room provided  Goal: Optimal Comfort and Wellbeing  Outcome: Ongoing, Progressing  Intervention: Monitor Pain and Promote Comfort  Recent Flowsheet Documentation  Taken 1/2/2023 2200 by Roselyn Mccarthy RN  Pain Management Interventions: (patient given scheduled tylenol at 2108) see MAR  Taken 1/2/2023 2108 by Roselyn Mccarthy RN  Pain Management Interventions: see MAR  Taken 1/2/2023 2015 by Roselyn Mccarthy RN  Pain Management Interventions: medication offered but refused  Intervention: Provide Person-Centered Care  Recent Flowsheet Documentation  Taken 1/2/2023 2108 by Roselyn Mccarthy RN  Trust Relationship/Rapport:   care explained   emotional support provided    empathic listening provided   questions answered   questions encouraged   thoughts/feelings acknowledged  Goal: Readiness for Transition of Care  Outcome: Ongoing, Progressing     Problem: Fall Injury Risk  Goal: Absence of Fall and Fall-Related Injury  Outcome: Ongoing, Progressing  Intervention: Identify and Manage Contributors  Recent Flowsheet Documentation  Taken 1/2/2023 2108 by Roselyn Mccarthy, RN  Medication Review/Management: medications reviewed  Intervention: Promote Injury-Free Environment  Recent Flowsheet Documentation  Taken 1/3/2023 0200 by Roselyn Mccarthy, RN  Safety Promotion/Fall Prevention:   assistive device/personal items within reach   clutter free environment maintained   fall prevention program maintained   nonskid shoes/slippers when out of bed   room organization consistent   safety round/check completed  Taken 1/3/2023 0000 by Roselyn Mccarthy, RN  Safety Promotion/Fall Prevention:   assistive device/personal items within reach   clutter free environment maintained   fall prevention program maintained   nonskid shoes/slippers when out of bed   room organization consistent   safety round/check completed  Taken 1/2/2023 2200 by Roselyn Mccarthy, RN  Safety Promotion/Fall Prevention:   assistive device/personal items within reach   clutter free environment maintained   fall prevention program maintained   nonskid shoes/slippers when out of bed   safety round/check completed   room organization consistent  Taken 1/2/2023 2108 by Roselyn Mccarthy, RN  Safety Promotion/Fall Prevention:   assistive device/personal items within reach   clutter free environment maintained   fall prevention program maintained   nonskid shoes/slippers when out of bed   room organization consistent   safety round/check completed  Taken 1/2/2023 2015 by Roselyn Mccarthy, RN  Safety Promotion/Fall Prevention:   assistive device/personal items within reach   clutter free environment maintained   fall prevention program maintained   nonskid  shoes/slippers when out of bed   room organization consistent   safety round/check completed     Problem: Adjustment to Illness (Sepsis/Septic Shock)  Goal: Optimal Coping  Outcome: Ongoing, Progressing  Intervention: Optimize Psychosocial Adjustment to Illness  Recent Flowsheet Documentation  Taken 1/2/2023 2108 by Roselyn Mccarthy, RN  Family/Support System Care:   self-care encouraged   support provided     Problem: Bleeding (Sepsis/Septic Shock)  Goal: Absence of Bleeding  Outcome: Ongoing, Progressing     Problem: Glycemic Control Impaired (Sepsis/Septic Shock)  Goal: Blood Glucose Level Within Desired Range  Outcome: Ongoing, Progressing     Problem: Infection Progression (Sepsis/Septic Shock)  Goal: Absence of Infection Signs and Symptoms  Outcome: Ongoing, Progressing  Intervention: Initiate Sepsis Management  Recent Flowsheet Documentation  Taken 1/3/2023 0200 by Roselyn Mccarthy RN  Infection Prevention:   environmental surveillance performed   hand hygiene promoted   personal protective equipment utilized   rest/sleep promoted   single patient room provided  Taken 1/3/2023 0000 by Roselyn Mccarthy, RN  Infection Prevention:   environmental surveillance performed   hand hygiene promoted   personal protective equipment utilized   rest/sleep promoted   single patient room provided  Taken 1/2/2023 2200 by Roselyn Mccarthy, RN  Infection Prevention:   environmental surveillance performed   hand hygiene promoted   personal protective equipment utilized   rest/sleep promoted   single patient room provided  Taken 1/2/2023 2108 by Roselyn Mccarthy, RN  Infection Prevention:   environmental surveillance performed   hand hygiene promoted   personal protective equipment utilized   rest/sleep promoted   single patient room provided  Taken 1/2/2023 2015 by Roselyn Mccarthy, RN  Infection Prevention:   environmental surveillance performed   hand hygiene promoted   personal protective equipment utilized   rest/sleep promoted   single patient  room provided  Intervention: Promote Recovery  Recent Flowsheet Documentation  Taken 1/3/2023 0200 by Roselyn Mccarthy, RN  Activity Management:   activity adjusted per tolerance   activity encouraged  Taken 1/3/2023 0000 by Roselyn Mccarthy, RN  Activity Management:   activity adjusted per tolerance   activity encouraged  Taken 1/2/2023 2200 by Roselyn Mccarthy, RN  Activity Management:   activity adjusted per tolerance   activity encouraged  Taken 1/2/2023 2108 by Roselyn Mccarthy, RN  Activity Management:   activity adjusted per tolerance   activity encouraged  Taken 1/2/2023 2015 by Roselyn Mccarthy, RN  Activity Management:   activity adjusted per tolerance   activity encouraged     Problem: Nutrition Impaired (Sepsis/Septic Shock)  Goal: Optimal Nutrition Intake  Outcome: Ongoing, Progressing     Problem: UTI (Urinary Tract Infection)  Goal: Improved Infection Symptoms  Outcome: Ongoing, Progressing   Goal Outcome Evaluation:              Outcome Evaluation: Patient is A&Ox4, VSS.  Patient has complained of a headache this morning.  No other complaints at this time.

## 2023-01-03 NOTE — PROGRESS NOTES
[facility]   Urology Progress Note    Patient Name: Cj Cifuentes  : 1950  MRN: [id number]  Primary Care Physician:  Gary Kaur MD  Date of admission: 2023    Subjective   Subjective     Chief Complaint: Fever/left sided scrotal swelling    HPI:  Patient resting in bed. Ambulated with PT earlier today. Had large incontinent void at that time, bladder scan PVR was 0. Pain controlled.    He currently does not feel the urge to void, bladder scan 362cc. Febrile; 100.0.    Review of Systems   All systems were reviewed and negative except for: none    Objective   Objective     Vitals:   Temp:  [97.7 °F (36.5 °C)-100 °F (37.8 °C)] 100 °F (37.8 °C)  Heart Rate:  [] 97  Resp:  [16-18] 18  BP: (127-149)/(75-96) 149/75  Flow (L/min):  [2] 2  Physical Exam    Constitutional: Awake in bed, alert   Eyes: PERRLA, sclerae anicteric, no conjunctival injection   HENT: Normocephalic, atraumatic, mucous membranes moist   Neck: Supple, trachea midline   Respiratory: Equal chest rise, non-labored respirations    Cardiovascular: RRR, palpable radial pulses bilaterally   Gastrointestinal: Soft, nontender, non-distended   Genitourinary: scrotal swelling and erythema. Buried penis. No lesions/crepitus.   Musculoskeletal: No lower extremity edema bilaterally, no clubbing or cyanosis to extremities   Psychiatric: Appropriate affect, cooperative   Neurologic: Oriented x 3,  Cranial Nerves grossly intact, speech clear   Skin: No rashes     Result Review    Result Review:  I have personally reviewed the results from the time of this admission to 1/3/2023 16:34 EST and agree with these findings:  [x]  Laboratory  [x]  Microbiology  []  Radiology  []  EKG/Telemetry   []  Cardiology/Vascular   []  Pathology  []  Old records  []  Other:    Assessment & Plan   Assessment / Plan     Brief Patient Summary:  Cj Cifuentes is a 72 y.o. male who is s/p Robotic assisted laparoscopic simple prostatectomy with   Milad who was readmitted for epididymoorchitis. Mayes catheter removed this morning.     Active Hospital Problems:  Active Hospital Problems    Diagnosis    • **Sepsis, due to unspecified organism, unspecified whether acute organ dysfunction present (HCC)    • Essential hypertension    • Type 2 diabetes mellitus with hyperglycemia, without long-term current use of insulin (HCC)    • Obesity (BMI 30-39.9)    • Hernia, abdominal    • ÁNGEL (acute kidney injury) ([facility])        Plan:   -Bladder scan 362 cc currently; d/w Dr. Metz. Ok to continue to monitor with bladder scan Q8 hour PVR. Notify Urology if >400cc.   -Keep scrotum elevated  -Continue abx   will follow, please call if any questions       DVT prophylaxis:  Medical DVT prophylaxis orders are present.    CODE STATUS:   Code Status (Patient has no pulse and is not breathing): CPR (Attempt to Resuscitate)  Medical Interventions (Patient has pulse or is breathing): Full Support    Disposition:  I expect patient to remain inpatient.     Electronically signed by ROCIO Fortune, 01/03/23, 4:34 PM EST.

## 2023-01-03 NOTE — PLAN OF CARE
Goal Outcome Evaluation:  Plan of Care Reviewed With: patient        Progress: improving  Outcome Evaluation: Pt ambulated 450 feet with FWx and CGA with cueing for upright posture and maintaining within close proximity to walker for safety. Pt demonstrated decreased functional endurance at this date and rec ambulating with FWx d/t unsteadiness with gait. d/c rec home with assist. Rec initiate stair training next PT session to ensure safety.

## 2023-01-03 NOTE — CASE MANAGEMENT/SOCIAL WORK
Continued Stay Note  [facility]     Patient Name: Cj Cifuentes  MRN: [id number]  Today's Date: 1/3/2023    Admit Date: 1/1/2023    Plan: CM update   Discharge Plan     Row Name 01/03/23 1221       Plan    Plan CM update    Plan Comments Patient's carbone cath has been removed and monitoring continues along with IV antibiotics. Patient ambulated 450 ft with PT today who recommends home with assist. Goal is to return home with his spouse upon discharge. No dc needs anticipated at this time, CM will continue to follow- chase [phone]    Final Discharge Disposition Code 01 - home or self-care               Discharge Codes    No documentation.               Expected Discharge Date and Time     Expected Discharge Date Expected Discharge Time    Jan 4, 2023             Chase Jarquin RN

## 2023-01-03 NOTE — PROGRESS NOTES
[facility]   Urology Progress Note    Patient Name: Cj Cifuentes  : 1950  MRN: [id number]  Date of admission: 2023  Surgical Procedures Since Admission:    Subjective   Subjective     Chief Complaint: Epididymoorchitis    History of Present Illness   Reports he did well overnight.  States that he is still sore but improving.  Catheter with clear urine.  Prelim urine culture with Pseudomonas.        Objective   Objective     Vitals:   Temp:  [97.7 °F (36.5 °C)-100.3 °F (37.9 °C)] 98 °F (36.7 °C)  Heart Rate:  [] 79  Resp:  [16-20] 18  BP: (127-154)/(74-96) 137/78  Flow (L/min):  [2] 2  Output by Drain (mL) 23 0701 - 23 1900 23 1901 - 23 0700 23 0701 - 23 0924 Range Total   Urethral Catheter Coude 18 Fr. 700 [id number]       Physical Exam  Vitals and nursing note reviewed.   Constitutional:       General: He is awake. He is not in acute distress.     Appearance: Normal appearance. He is well-developed. He is obese.   HENT:      Head: Normocephalic and atraumatic.      Right Ear: External ear normal.      Left Ear: External ear normal.      Nose: Nose normal.   Eyes:      Conjunctiva/sclera: Conjunctivae normal.   Pulmonary:      Effort: Pulmonary effort is normal.   Abdominal:      General: There is no distension.      Palpations: Abdomen is soft. There is no mass.      Tenderness: There is no abdominal tenderness. There is no right CVA tenderness, left CVA tenderness, guarding or rebound.      Hernia: No hernia is present. There is no hernia in the left inguinal area or right inguinal area.   Genitourinary:     Pubic Area: No rash.       Rectum: No mass or tenderness. Normal anal tone.      Comments: Penile edema.    Scrotal edema with left testicle indurated.  No purulence.  No skin breakdown.  NO crepitus  Musculoskeletal:      Cervical back: Normal range of motion.   Lymphadenopathy:      Lower Body: No right inguinal adenopathy. No left inguinal  adenopathy.   Skin:     General: Skin is warm.   Neurological:      General: No focal deficit present.      Mental Status: He is alert and oriented to person, place, and time.   Psychiatric:         Behavior: Behavior normal. Behavior is cooperative.                Result Review    Result Review:  I have personally reviewed the results from the time of this admission to 1/3/2023 09:24 EST and agree with these findings:  [x]  Laboratory  [x]  Microbiology  []  Radiology  []  EKG/Telemetry   []  Cardiology/Vascular   []  Pathology  []  Old records  []  Other:  Most notable findings include:     Assessment & Plan   Assessment / Plan     Brief Patient Summary:  Cj Cifuentes is a 72 y.o. male who is s/p robot assisted lap simple prostatectomy.  Readmitted with epididymoorchitis. His WBC is trending down now at 30k.  Subjectively feels better.      Active Hospital Problems:  Active Hospital Problems    Diagnosis    • **Sepsis, due to unspecified organism, unspecified whether acute organ dysfunction present ([facility])    • Essential hypertension    • Type 2 diabetes mellitus with hyperglycemia, without long-term current use of insulin ([facility])    • Obesity (BMI 30-39.9)    • Hernia, abdominal    • ÁNGEL (acute kidney injury) ([facility])      Plan:   1.  Mayes catheter removed today for TOV.  Will need bladder scan after his void  2.  Keep scrotum elevated  3.  Alterate ice packs and warm compresses  4.  Continue broad spectrum antibiotic coverage for now  5.  Will follow up his TOV today.

## 2023-01-04 LAB
ANION GAP SERPL CALCULATED.3IONS-SCNC: 11 MMOL/L (ref 5–15)
BACTERIA SPEC AEROBE CULT: ABNORMAL
BASOPHILS # BLD AUTO: 0.05 10*3/MM3 (ref 0–0.2)
BASOPHILS NFR BLD AUTO: 0.2 % (ref 0–1.5)
BUN SERPL-MCNC: 12 MG/DL (ref 8–23)
BUN/CREAT SERPL: 21.8 (ref 7–25)
CALCIUM SPEC-SCNC: 8.2 MG/DL (ref 8.6–10.5)
CHLORIDE SERPL-SCNC: 101 MMOL/L (ref 98–107)
CO2 SERPL-SCNC: 23 MMOL/L (ref 22–29)
CREAT SERPL-MCNC: 0.55 MG/DL (ref 0.76–1.27)
DEPRECATED RDW RBC AUTO: 41.9 FL (ref 37–54)
EGFRCR SERPLBLD CKD-EPI 2021: 106.6 ML/MIN/1.73
EOSINOPHIL # BLD AUTO: 0.27 10*3/MM3 (ref 0–0.4)
EOSINOPHIL NFR BLD AUTO: 1.3 % (ref 0.3–6.2)
ERYTHROCYTE [DISTWIDTH] IN BLOOD BY AUTOMATED COUNT: 13.4 % (ref 12.3–15.4)
GLUCOSE BLDC GLUCOMTR-MCNC: 164 MG/DL (ref 70–130)
GLUCOSE BLDC GLUCOMTR-MCNC: 175 MG/DL (ref 70–130)
GLUCOSE BLDC GLUCOMTR-MCNC: 185 MG/DL (ref 70–130)
GLUCOSE SERPL-MCNC: 188 MG/DL (ref 65–99)
HCT VFR BLD AUTO: 28.8 % (ref 37.5–51)
HGB BLD-MCNC: 9.9 G/DL (ref 13–17.7)
IMM GRANULOCYTES # BLD AUTO: 0.58 10*3/MM3 (ref 0–0.05)
IMM GRANULOCYTES NFR BLD AUTO: 2.7 % (ref 0–0.5)
LYMPHOCYTES # BLD AUTO: 1.11 10*3/MM3 (ref 0.7–3.1)
LYMPHOCYTES NFR BLD AUTO: 5.2 % (ref 19.6–45.3)
MCH RBC QN AUTO: 29.2 PG (ref 26.6–33)
MCHC RBC AUTO-ENTMCNC: 34.4 G/DL (ref 31.5–35.7)
MCV RBC AUTO: 85 FL (ref 79–97)
MONOCYTES # BLD AUTO: 1.07 10*3/MM3 (ref 0.1–0.9)
MONOCYTES NFR BLD AUTO: 5.1 % (ref 5–12)
NEUTROPHILS NFR BLD AUTO: 18.07 10*3/MM3 (ref 1.7–7)
NEUTROPHILS NFR BLD AUTO: 85.5 % (ref 42.7–76)
NRBC BLD AUTO-RTO: 0 /100 WBC (ref 0–0.2)
PLATELET # BLD AUTO: 297 10*3/MM3 (ref 140–450)
PMV BLD AUTO: 9.2 FL (ref 6–12)
POTASSIUM SERPL-SCNC: 3.4 MMOL/L (ref 3.5–5.2)
QT INTERVAL: 354 MS
QTC INTERVAL: 437 MS
RBC # BLD AUTO: 3.39 10*6/MM3 (ref 4.14–5.8)
SODIUM SERPL-SCNC: 135 MMOL/L (ref 136–145)
WBC NRBC COR # BLD: 21.15 10*3/MM3 (ref 3.4–10.8)

## 2023-01-04 PROCEDURE — 99233 SBSQ HOSP IP/OBS HIGH 50: CPT | Performed by: NURSE PRACTITIONER

## 2023-01-04 PROCEDURE — 80048 BASIC METABOLIC PNL TOTAL CA: CPT | Performed by: INTERNAL MEDICINE

## 2023-01-04 PROCEDURE — 63710000001 INSULIN LISPRO (HUMAN) PER 5 UNITS: Performed by: NURSE PRACTITIONER

## 2023-01-04 PROCEDURE — 99233 SBSQ HOSP IP/OBS HIGH 50: CPT | Performed by: FAMILY MEDICINE

## 2023-01-04 PROCEDURE — 25010000002 PIPERACILLIN SOD-TAZOBACTAM PER 1 G: Performed by: NURSE PRACTITIONER

## 2023-01-04 PROCEDURE — 25010000002 HEPARIN (PORCINE) PER 1000 UNITS: Performed by: NURSE PRACTITIONER

## 2023-01-04 PROCEDURE — 97116 GAIT TRAINING THERAPY: CPT

## 2023-01-04 PROCEDURE — 85025 COMPLETE CBC W/AUTO DIFF WBC: CPT | Performed by: INTERNAL MEDICINE

## 2023-01-04 PROCEDURE — 82962 GLUCOSE BLOOD TEST: CPT

## 2023-01-04 PROCEDURE — G0378 HOSPITAL OBSERVATION PER HR: HCPCS

## 2023-01-04 RX ADMIN — Medication 10 ML: at 09:18

## 2023-01-04 RX ADMIN — LEVOTHYROXINE SODIUM 50 MCG: 50 TABLET ORAL at 06:26

## 2023-01-04 RX ADMIN — POTASSIUM CHLORIDE 40 MEQ: 750 CAPSULE, EXTENDED RELEASE ORAL at 21:57

## 2023-01-04 RX ADMIN — TAZOBACTAM SODIUM AND PIPERACILLIN SODIUM 3.38 G: 375; 3 INJECTION, SOLUTION INTRAVENOUS at 12:17

## 2023-01-04 RX ADMIN — OXYCODONE 5 MG: 5 TABLET ORAL at 09:18

## 2023-01-04 RX ADMIN — SENNOSIDES AND DOCUSATE SODIUM 2 TABLET: 50; 8.6 TABLET ORAL at 08:59

## 2023-01-04 RX ADMIN — HEPARIN SODIUM 5000 UNITS: 5000 INJECTION INTRAVENOUS; SUBCUTANEOUS at 14:02

## 2023-01-04 RX ADMIN — ATORVASTATIN CALCIUM 20 MG: 20 TABLET, FILM COATED ORAL at 20:53

## 2023-01-04 RX ADMIN — OXYCODONE 5 MG: 5 TABLET ORAL at 15:58

## 2023-01-04 RX ADMIN — OXYCODONE 5 MG: 5 TABLET ORAL at 04:17

## 2023-01-04 RX ADMIN — ACETAMINOPHEN 1000 MG: 500 TABLET, FILM COATED ORAL at 12:17

## 2023-01-04 RX ADMIN — ACETAMINOPHEN 1000 MG: 500 TABLET, FILM COATED ORAL at 20:53

## 2023-01-04 RX ADMIN — INSULIN LISPRO 2 UNITS: 100 INJECTION, SOLUTION INTRAVENOUS; SUBCUTANEOUS at 08:59

## 2023-01-04 RX ADMIN — DOCUSATE SODIUM 100 MG: 100 CAPSULE, LIQUID FILLED ORAL at 08:59

## 2023-01-04 RX ADMIN — ACETAMINOPHEN 1000 MG: 500 TABLET, FILM COATED ORAL at 04:17

## 2023-01-04 RX ADMIN — HEPARIN SODIUM 5000 UNITS: 5000 INJECTION INTRAVENOUS; SUBCUTANEOUS at 06:26

## 2023-01-04 RX ADMIN — TAZOBACTAM SODIUM AND PIPERACILLIN SODIUM 3.38 G: 375; 3 INJECTION, SOLUTION INTRAVENOUS at 20:52

## 2023-01-04 RX ADMIN — TAZOBACTAM SODIUM AND PIPERACILLIN SODIUM 3.38 G: 375; 3 INJECTION, SOLUTION INTRAVENOUS at 04:17

## 2023-01-04 RX ADMIN — HEPARIN SODIUM 5000 UNITS: 5000 INJECTION INTRAVENOUS; SUBCUTANEOUS at 21:06

## 2023-01-04 RX ADMIN — INSULIN LISPRO 2 UNITS: 100 INJECTION, SOLUTION INTRAVENOUS; SUBCUTANEOUS at 12:17

## 2023-01-04 RX ADMIN — INSULIN LISPRO 2 UNITS: 100 INJECTION, SOLUTION INTRAVENOUS; SUBCUTANEOUS at 17:27

## 2023-01-04 RX ADMIN — TAMSULOSIN HYDROCHLORIDE 0.4 MG: 0.4 CAPSULE ORAL at 20:53

## 2023-01-04 RX ADMIN — Medication 10 ML: at 20:55

## 2023-01-04 RX ADMIN — POTASSIUM CHLORIDE 40 MEQ: 750 CAPSULE, EXTENDED RELEASE ORAL at 12:19
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
## 2023-01-04 NOTE — PLAN OF CARE
Problem: Adult Inpatient Plan of Care  Goal: Plan of Care Review  Outcome: Ongoing, Progressing  Flowsheets (Taken 1/3/2023 1902)  Plan of Care Reviewed With: patient  Outcome Evaluation: Pt continues to have pain with redness & edema of penis and scrotum. Temperature did get up to 100F today but is now trending down. Mayes was removed by urology. Pt is having frequent urinary incontinence. Maria Eugenia CHAN with Urology orderd for q8 hr bladder scans following voids and to notify if > 400ml. Last scanned at 16:30. Ambulated in hallway with PT.  Goal: Patient-Specific Goal (Individualized)  Outcome: Ongoing, Progressing  Goal: Absence of Hospital-Acquired Illness or Injury  Outcome: Ongoing, Progressing  Intervention: Identify and Manage Fall Risk  Recent Flowsheet Documentation  Taken 1/3/2023 1800 by Melissa Aranda RN  Safety Promotion/Fall Prevention:   activity supervised   fall prevention program maintained   nonskid shoes/slippers when out of bed   safety round/check completed  Taken 1/3/2023 1600 by Melissa Aranda RN  Safety Promotion/Fall Prevention:   activity supervised   fall prevention program maintained   nonskid shoes/slippers when out of bed   safety round/check completed  Taken 1/3/2023 1400 by Melissa Aranda RN  Safety Promotion/Fall Prevention:   activity supervised   fall prevention program maintained   nonskid shoes/slippers when out of bed   safety round/check completed  Taken 1/3/2023 1200 by Melissa Aranda RN  Safety Promotion/Fall Prevention:   activity supervised   fall prevention program maintained   nonskid shoes/slippers when out of bed   safety round/check completed  Taken 1/3/2023 1000 by Melissa Aranda RN  Safety Promotion/Fall Prevention:   activity supervised   fall prevention program maintained   nonskid shoes/slippers when out of bed   safety round/check completed  Taken 1/3/2023 0800 by Melissa Aranda RN  Safety Promotion/Fall Prevention:   activity supervised   fall prevention  "program maintained   nonskid shoes/slippers when out of bed   safety round/check completed  Intervention: Prevent Skin Injury  Recent Flowsheet Documentation  Taken 1/3/2023 1800 by Melissa Aranda RN  Body Position: position changed independently  Taken 1/3/2023 1600 by Melissa Aranda RN  Body Position: position changed independently  Taken 1/3/2023 1400 by Melissa Aranda RN  Body Position: position changed independently  Taken 1/3/2023 1200 by Melissa Aranda RN  Body Position: position changed independently  Taken 1/3/2023 1000 by Melissa Aranda RN  Body Position: position changed independently  Taken 1/3/2023 0800 by Melissa Aranda RN  Body Position: position changed independently  Intervention: Prevent and Manage VTE (Venous Thromboembolism) Risk  Recent Flowsheet Documentation  Taken 1/3/2023 1800 by Melissa Aranda RN  Activity Management: activity adjusted per tolerance  Taken 1/3/2023 1600 by Melissa Aranda RN  Activity Management: activity adjusted per tolerance  Taken 1/3/2023 1400 by Melissa Aranda RN  Activity Management: activity adjusted per tolerance  Taken 1/3/2023 1200 by Melissa Aranda RN  Activity Management: activity adjusted per tolerance  Taken 1/3/2023 1000 by Melissa Aranda RN  Activity Management: activity adjusted per tolerance  Taken 1/3/2023 0800 by Melissa Aranda RN  Activity Management: activity adjusted per tolerance  Goal: Optimal Comfort and Wellbeing  Outcome: Ongoing, Progressing  Intervention: Monitor Pain and Promote Comfort  Recent Flowsheet Documentation  Taken 1/3/2023 1800 by Melissa Aranda RN  Pain Management Interventions: (heat or cold offered. pt refused) other (see comments)  Taken 1/3/2023 1600 by Melissa Aranda RN  Pain Management Interventions: (heat or ice pack offered. pt refused)   other (see comments)   medication offered but refused  Taken 1/3/2023 1400 by Melissa Aranda RN  Pain Management Interventions: (\"pain is slowly getting better\") other (see " comments)  Taken 1/3/2023 1000 by Melissa Aranda RN  Pain Management Interventions: medication offered but refused  Taken 1/3/2023 0800 by Melissa Aranda RN  Pain Management Interventions: medication offered but refused  Intervention: Provide Person-Centered Care  Recent Flowsheet Documentation  Taken 1/3/2023 0800 by Melissa Aranda RN  Trust Relationship/Rapport:   care explained   thoughts/feelings acknowledged  Goal: Readiness for Transition of Care  Outcome: Ongoing, Progressing   Goal Outcome Evaluation:  Plan of Care Reviewed With: patient           Outcome Evaluation: Pt continues to have pain with redness & edema of penis and scrotum. Temperature did get up to 100F today but is now trending down. Mayes was removed by urology. Pt is having frequent urinary incontinence. Maria Eugenia CHAN with Urology orderd for q8 hr bladder scans following voids and to notify if > 400ml. Last scanned at 16:30. Ambulated in hallway with PT.

## 2023-01-04 NOTE — PLAN OF CARE
Goal Outcome Evaluation:  Plan of Care Reviewed With: patient        Progress: improving  Outcome Evaluation: Pt increased ambulation distance to 900ft with RW and CGA. VC's for upright posture and to keep RW close with feet inside middle. Three brief standing rest breaks to correct gait mechanics. Pt with adequate balance and stability. Denied any c/o pain. Continue to progress as appropriate.

## 2023-01-04 NOTE — PROGRESS NOTES
[facility] Medicine Services  PROGRESS NOTE    Patient Name: Cj Cifuentes  : 1950  MRN: [id number]    Date of Admission: 2023  Primary Care Physician: Gary Kaur MD    Subjective   Subjective     CC:  F/U sepsis due to UTI    HPI:  Patient seen and examined. He has been able to void. Also had a BM. He states when he coughs, he has some urine leakage. No fevers or chills.     ROS:  Gen- No fevers, chills  CV- No chest pain, palpitations  Resp- No cough, dyspnea  GI- No N/V/D, abd pain    Objective   Objective     Vital Signs:   Temp:  [97.9 °F (36.6 °C)-100 °F (37.8 °C)] 97.9 °F (36.6 °C)  Heart Rate:  [] 79  Resp:  [16-18] 18  BP: (126-149)/(75-84) 126/75     Physical Exam:  Constitutional: No acute distress, awake, alert, sitting up in bed   HENT: NCAT, mucous membranes moist  Respiratory: Respiratory effort normal, Clear to Auscultation B/L   Cardiovascular: RRR, no murmurs  Gastrointestinal: BSx4, non-tender, non-distended   : Mod scrotal scwelling  Musculoskeletal: No bilateral ankle edema  Psychiatric: Appropriate affect, cooperative  Neurologic: Speech clear  Skin: No rashes on exposed surfaces    Results Reviewed:  LAB RESULTS:      Lab 23  0722 23  0602 23  0810 23  1828 23  1322   WBC 21.15* 30.46* 42.32*  --  47.57*   HEMOGLOBIN 9.9* 9.8* 10.7*  --  12.9*   HEMATOCRIT 28.8* 28.7* 32.2*  --  37.6   PLATELETS 297 291 301  --  333   NEUTROS ABS 18.07* 27.57* 38.24*  --  43.18*   IMMATURE GRANS (ABS) 0.58* 0.84* 0.91*  --  1.74*   LYMPHS ABS 1.11 1.00 1.02  --  0.50*   MONOS ABS 1.07* 0.91* 2.00*  --  2.10*   EOS ABS 0.27 0.09 0.01  --  0.01   MCV 85.0 85.4 87.3  --  87.0   PROCALCITONIN  --   --   --   --  6.79*   LACTATE  --   --   --  4.3* 4.2*         Lab 23  0722 23  0602 23  0810 23  1322   SODIUM 135* 135* 134* 133*   POTASSIUM 3.4* 3.8 3.6 3.6   CHLORIDE 101 103 100 94*   CO2 23.0 22.0  20.0* 21.0*   ANION GAP 11.0 10.0 14.0 18.0*   BUN 12 11 10 13   CREATININE 0.55* 0.56* 0.72* 1.09   EGFR 106.6 106.0 98.3 73.0   GLUCOSE 188* 171* 175* 285*   CALCIUM 8.2* 8.3* 8.6 9.2   MAGNESIUM  --   --  1.5*  --          Lab 01/01/23  1322   TOTAL PROTEIN 7.4   ALBUMIN 3.7   GLOBULIN 3.7   ALT (SGPT) 39   AST (SGOT) 24   BILIRUBIN 1.6*   ALK PHOS 128*                     Brief Urine Lab Results  (Last result in the past 365 days)      Color   Clarity   Blood   Leuk Est   Nitrite   Protein   CREAT   Urine HCG        01/01/23 1735 Yellow   Cloudy   Large (3+)   Large (3+)   Negative   30 mg/dL (1+)                 Microbiology Results Abnormal     Procedure Component Value - Date/Time    Blood Culture - Blood, Hand, Right [[id number]]  (Normal) Collected: 01/01/23 1322    Lab Status: Preliminary result Specimen: Blood from Hand, Right Updated: 01/03/23 1346     Blood Culture No growth at 2 days    Blood Culture - Blood, Arm, Right [[id number]]  (Normal) Collected: 01/01/23 1322    Lab Status: Preliminary result Specimen: Blood from Arm, Right Updated: 01/03/23 1346     Blood Culture No growth at 2 days    MRSA Screen, PCR (Inpatient) - Swab, Nares [[id number]]  (Normal) Collected: 01/02/23 0102    Lab Status: Final result Specimen: Swab from Nares Updated: 01/02/23 0910     MRSA PCR Negative    Narrative:      The negative predictive value of this diagnostic test is high and should only be used to consider de-escalating anti-MRSA therapy. A positive result may indicate colonization with MRSA and must be correlated clinically.  MRSA Negative    COVID PRE-OP / PRE-PROCEDURE SCREENING ORDER (NO ISOLATION) - Swab, Nasopharynx [[id number]]  (Normal) Collected: 01/01/23 1325    Lab Status: Final result Specimen: Swab from Nasopharynx Updated: 01/01/23 1357    Narrative:      The following orders were created for panel order COVID PRE-OP / PRE-PROCEDURE SCREENING ORDER (NO ISOLATION) - Swab, Nasopharynx.  Procedure                                Abnormality         Status                     ---------                               -----------         ------                     COVID-19 and FLU A/B PCR...[[id number]]  Normal              Final result                 Please view results for these tests on the individual orders.    COVID-19 and FLU A/B PCR - Swab, Nasopharynx [[id number]]  (Normal) Collected: 01/01/23 1325    Lab Status: Final result Specimen: Swab from Nasopharynx Updated: 01/01/23 135     COVID19 Not Detected     Influenza A PCR Not Detected     Influenza B PCR Not Detected    Narrative:      Fact sheet for providers: https://www.fda.gov/media/482119/download    Fact sheet for patients: https://www.fda.gov/media/774079/download    Test performed by PCR.          No radiology results from the last 24 hrs        I have reviewed the medications:  Scheduled Meds:acetaminophen, 1,000 mg, Oral, Q8H  atorvastatin, 20 mg, Oral, Nightly  docusate sodium, 100 mg, Oral, BID  heparin (porcine), 5,000 Units, Subcutaneous, Q8H  insulin lispro, 0-7 Units, Subcutaneous, TID AC  levothyroxine, 50 mcg, Oral, Q AM  piperacillin-tazobactam, 3.375 g, Intravenous, Q8H  senna-docusate sodium, 2 tablet, Oral, BID  sodium chloride, 10 mL, Intravenous, Q12H  tamsulosin, 0.4 mg, Oral, Nightly      Continuous Infusions:   PRN Meds:.•  senna-docusate sodium **AND** polyethylene glycol **AND** bisacodyl **AND** bisacodyl  •  cyclobenzaprine  •  dextrose  •  dextrose  •  glucagon (human recombinant)  •  HYDROmorphone  •  hyoscyamine sulfate  •  magnesium sulfate **OR** magnesium sulfate **OR** magnesium sulfate  •  melatonin  •  ondansetron  •  oxybutynin  •  oxyCODONE  •  phenazopyridine  •  potassium chloride **OR** potassium chloride **OR** potassium chloride  •  [COMPLETED] Insert Peripheral IV **AND** sodium chloride  •  sodium chloride  •  sodium chloride    Assessment & Plan   Assessment & Plan     Active Hospital Problems    Diagnosis  POA   •  **Sepsis, due to unspecified organism, unspecified whether acute organ dysfunction present (HCC) [A41.9]  Yes   • Essential hypertension [I10]  Yes   • Type 2 diabetes mellitus with hyperglycemia, without long-term current use of insulin (HCC) [E11.65]  Yes   • Obesity (BMI 30-39.9) [E66.9]  Yes   • Hernia, abdominal [K46.9]  Yes   • ÁNGEL (acute kidney injury) (HCC) [N17.9]  Yes      Resolved Hospital Problems   No resolved problems to display.        Brief Hospital Course to date:  Cj Cifuentes is a 72 y.o. male with history of BPH, acute urinary retention, DM2, hypothyroidism, HTN, HLD GERD and sleep apnea who had a recent prostatectomy and presented to the ED due to fever and was found to have sepsis due to UTI.    This patient's problems and plans were partially entered by my partner and updated as appropriate by me 01/04/23.  All problems are new to me today    Sepsis due to UTI  -Urine culture with pseudomonas.  Continue Zosyn while inpatient, WBC much improved, trending down  -Plan to DC on oral Levaquin   -Urology following, DC home when ok with their service     DM2  -A1c 7.0%  -SSI    ÁNGEL, mild  -Resolved with IV fluids    Hypothyroidism  -Continue synthroid    Hypokalemia, mild  -Replace per protocol     Expected Discharge Location and Transportation: Home  Expected Discharge 1/5/23  Expected Discharge Date and Time     Expected Discharge Date Expected Discharge Time    Jan 4, 2023            DVT prophylaxis:  Medical DVT prophylaxis orders are present.     AM-PAC 6 Clicks Score (PT): 19 (01/03/23 2000)    CODE STATUS:   Code Status and Medical Interventions:   Ordered at: 01/01/23 1801     Code Status (Patient has no pulse and is not breathing):    CPR (Attempt to Resuscitate)     Medical Interventions (Patient has pulse or is breathing):    Full Support       Carina Cuba,   01/04/23

## 2023-01-04 NOTE — CASE MANAGEMENT/SOCIAL WORK
Continued Stay Note  [facility]     Patient Name: Cj Cifuentes  MRN: [id number]  Today's Date: 1/4/2023    Admit Date: 1/1/2023    Plan: CM update   Discharge Plan     Row Name 01/04/23 1300       Plan    Plan CM update    Plan Comments Goal remains to return home upon discharge. Mayes has been removed and bladder scanning for PVR continues. IV antibiotics contiue as well. Patient able to ambulate with standby assist - CM will continue to follow for dc planning-chase [phone]    Final Discharge Disposition Code 01 - home or self-care               Discharge Codes    No documentation.               Expected Discharge Date and Time     Expected Discharge Date Expected Discharge Time    Jan 5, 2023             Chase Jarquin RN

## 2023-01-04 NOTE — PLAN OF CARE
Goal Outcome Evaluation:  Plan of Care Reviewed With: patient        Progress: improving  Outcome Evaluation: Patient has had a decent shift, sleeping on and off tonight. VSS, and patient has been alert and oriented times four. No complaints of pain thus far tonight. Nursing staff will continue to monitor and assess the patient.

## 2023-01-04 NOTE — PLAN OF CARE
Goal Outcome Evaluation:  Plan of Care Reviewed With: patient        Progress: improving  Outcome Evaluation: VSS. RA. Pt up in chair. Up with assist x1. C/o of scrotum pain which is improved when sling is in place. BS q8H for PVR. This AM PVR 214ml. Incontinece continuously through out shift. Pad and male incontience wrap in place changing. performing skin care frequenly through out shift. Pt utlizing IS up to 2000. IV antibiotics infused. K+ being replaced per protocol

## 2023-01-04 NOTE — PROGRESS NOTES
[facility]   Urology Progress Note    Patient Name: Cj Cifuentes  : 1950  MRN: [id number]  Primary Care Physician:  Gary Kaur MD  Date of admission: 2023    Subjective   Subjective     Chief Complaint: Scrotal Pain    HPI:  Patient resting in bed. Scrotal pain controlled. He reports low back pain. Had a good night. Afebrile overnight.     Review of Systems   All systems were reviewed and negative except for: scrotal pain    Objective   Objective     Vitals:   Temp:  [97.9 °F (36.6 °C)-100 °F (37.8 °C)] 97.9 °F (36.6 °C)  Heart Rate:  [] 79  Resp:  [16-18] 18  BP: (126-149)/(75-84) 126/75  Physical Exam    Constitutional: Awake in bed, alert   Eyes: PERRLA, sclerae anicteric, no conjunctival injection   HENT: Normocephalic, atraumatic, mucous membranes moist   Neck: Supple, trachea midline   Respiratory: Equal chest rise, non-labored respirations    Cardiovascular: RRR.   Gastrointestinal: Soft. Incision sites clean,dry ,intact.    Genitourinary: moderate scrotal swelling, left hemiscrotum tenderness with induration. No open wounds or lesion. Buried penis.   Musculoskeletal: No lower extremity edema bilaterally, no clubbing or cyanosis to extremities   Psychiatric: Appropriate affect, cooperative   Neurologic: Oriented x 3,  Cranial Nerves grossly intact, speech clear   Skin: No rashes     Result Review    Result Review:  I have personally reviewed the results from the time of this admission to 2023 09:29 EST and agree with these findings:  [x]  Laboratory  []  Microbiology  []  Radiology  []  EKG/Telemetry   []  Cardiology/Vascular   []  Pathology  []  Old records  []  Other:      Assessment & Plan   Assessment / Plan     Brief Patient Summary:  Cj Cifuentes is a 72 y.o. male who is s/p Robotic-assisted laparoscopic simple prostatectomy with Dr. Stinson who was readmitted for epididymoorchitis.     Active Hospital Problems:  Active Hospital Problems    Diagnosis    •  **Sepsis, due to unspecified organism, unspecified whether acute organ dysfunction present (HCC)    • Essential hypertension    • Type 2 diabetes mellitus with hyperglycemia, without long-term current use of insulin (HCC)    • Obesity (BMI 30-39.9)    • Hernia, abdominal    • ÁNGEL (acute kidney injury) ([facility])      He was able to void upon my assessment today. Bladder scan PVR at bedside, 214cc.   He is now up to chair. Placed sheet under scrotum for scrotal support.       WBC 21.15 from 30.46  Afebrile  Urine cx >100,000 pseudomonas aeruginosa.  Appreciate Hospitalist.   D/w Dr. Stinson.       DVT prophylaxis:  Medical DVT prophylaxis orders are present.    CODE STATUS:   Code Status (Patient has no pulse and is not breathing): CPR (Attempt to Resuscitate)  Medical Interventions (Patient has pulse or is breathing): Full Support    Disposition:  I expect patient to remain inpatient.     Electronically signed by ROCIO Fortune, 01/04/23, 9:29 AM EST.

## 2023-01-05 LAB
ANION GAP SERPL CALCULATED.3IONS-SCNC: 10 MMOL/L (ref 5–15)
BASOPHILS # BLD AUTO: 0.07 10*3/MM3 (ref 0–0.2)
BASOPHILS NFR BLD AUTO: 0.3 % (ref 0–1.5)
BUN SERPL-MCNC: 9 MG/DL (ref 8–23)
BUN/CREAT SERPL: 14.5 (ref 7–25)
CALCIUM SPEC-SCNC: 8.6 MG/DL (ref 8.6–10.5)
CHLORIDE SERPL-SCNC: 99 MMOL/L (ref 98–107)
CO2 SERPL-SCNC: 25 MMOL/L (ref 22–29)
CREAT SERPL-MCNC: 0.62 MG/DL (ref 0.76–1.27)
DEPRECATED RDW RBC AUTO: 42.6 FL (ref 37–54)
EGFRCR SERPLBLD CKD-EPI 2021: 102.8 ML/MIN/1.73
EOSINOPHIL # BLD AUTO: 0.22 10*3/MM3 (ref 0–0.4)
EOSINOPHIL NFR BLD AUTO: 1 % (ref 0.3–6.2)
ERYTHROCYTE [DISTWIDTH] IN BLOOD BY AUTOMATED COUNT: 13.6 % (ref 12.3–15.4)
GLUCOSE BLDC GLUCOMTR-MCNC: 167 MG/DL (ref 70–130)
GLUCOSE BLDC GLUCOMTR-MCNC: 172 MG/DL (ref 70–130)
GLUCOSE BLDC GLUCOMTR-MCNC: 212 MG/DL (ref 70–130)
GLUCOSE SERPL-MCNC: 222 MG/DL (ref 65–99)
HCT VFR BLD AUTO: 33.4 % (ref 37.5–51)
HGB BLD-MCNC: 11.3 G/DL (ref 13–17.7)
IMM GRANULOCYTES # BLD AUTO: 0.17 10*3/MM3 (ref 0–0.05)
IMM GRANULOCYTES NFR BLD AUTO: 0.8 % (ref 0–0.5)
LYMPHOCYTES # BLD AUTO: 1.14 10*3/MM3 (ref 0.7–3.1)
LYMPHOCYTES NFR BLD AUTO: 5.1 % (ref 19.6–45.3)
MAGNESIUM SERPL-MCNC: 1.6 MG/DL (ref 1.6–2.4)
MCH RBC QN AUTO: 29.5 PG (ref 26.6–33)
MCHC RBC AUTO-ENTMCNC: 33.8 G/DL (ref 31.5–35.7)
MCV RBC AUTO: 87.2 FL (ref 79–97)
MONOCYTES # BLD AUTO: 1.37 10*3/MM3 (ref 0.1–0.9)
MONOCYTES NFR BLD AUTO: 6.1 % (ref 5–12)
NEUTROPHILS NFR BLD AUTO: 19.31 10*3/MM3 (ref 1.7–7)
NEUTROPHILS NFR BLD AUTO: 86.7 % (ref 42.7–76)
NRBC BLD AUTO-RTO: 0 /100 WBC (ref 0–0.2)
PLATELET # BLD AUTO: 354 10*3/MM3 (ref 140–450)
PMV BLD AUTO: 9 FL (ref 6–12)
POTASSIUM SERPL-SCNC: 4.2 MMOL/L (ref 3.5–5.2)
RBC # BLD AUTO: 3.83 10*6/MM3 (ref 4.14–5.8)
SODIUM SERPL-SCNC: 134 MMOL/L (ref 136–145)
WBC NRBC COR # BLD: 22.28 10*3/MM3 (ref 3.4–10.8)

## 2023-01-05 PROCEDURE — 82962 GLUCOSE BLOOD TEST: CPT

## 2023-01-05 PROCEDURE — 97530 THERAPEUTIC ACTIVITIES: CPT

## 2023-01-05 PROCEDURE — 63710000001 INSULIN LISPRO (HUMAN) PER 5 UNITS: Performed by: NURSE PRACTITIONER

## 2023-01-05 PROCEDURE — 80048 BASIC METABOLIC PNL TOTAL CA: CPT | Performed by: FAMILY MEDICINE

## 2023-01-05 PROCEDURE — 25010000002 PIPERACILLIN SOD-TAZOBACTAM PER 1 G: Performed by: NURSE PRACTITIONER

## 2023-01-05 PROCEDURE — 25010000002 HEPARIN (PORCINE) PER 1000 UNITS: Performed by: NURSE PRACTITIONER

## 2023-01-05 PROCEDURE — 85025 COMPLETE CBC W/AUTO DIFF WBC: CPT | Performed by: FAMILY MEDICINE

## 2023-01-05 PROCEDURE — 99232 SBSQ HOSP IP/OBS MODERATE 35: CPT | Performed by: FAMILY MEDICINE

## 2023-01-05 PROCEDURE — 97116 GAIT TRAINING THERAPY: CPT

## 2023-01-05 PROCEDURE — G0378 HOSPITAL OBSERVATION PER HR: HCPCS

## 2023-01-05 PROCEDURE — 63710000001 INSULIN DETEMIR PER 5 UNITS: Performed by: FAMILY MEDICINE

## 2023-01-05 PROCEDURE — 99024 POSTOP FOLLOW-UP VISIT: CPT | Performed by: NURSE PRACTITIONER

## 2023-01-05 PROCEDURE — 83735 ASSAY OF MAGNESIUM: CPT | Performed by: FAMILY MEDICINE

## 2023-01-05 RX ADMIN — LEVOTHYROXINE SODIUM 50 MCG: 50 TABLET ORAL at 05:02

## 2023-01-05 RX ADMIN — TAZOBACTAM SODIUM AND PIPERACILLIN SODIUM 3.38 G: 375; 3 INJECTION, SOLUTION INTRAVENOUS at 12:23

## 2023-01-05 RX ADMIN — ACETAMINOPHEN 1000 MG: 500 TABLET, FILM COATED ORAL at 22:19

## 2023-01-05 RX ADMIN — OXYCODONE 5 MG: 5 TABLET ORAL at 09:34

## 2023-01-05 RX ADMIN — OXYCODONE 5 MG: 5 TABLET ORAL at 14:23

## 2023-01-05 RX ADMIN — OXYCODONE 5 MG: 5 TABLET ORAL at 02:42

## 2023-01-05 RX ADMIN — TAZOBACTAM SODIUM AND PIPERACILLIN SODIUM 3.38 G: 375; 3 INJECTION, SOLUTION INTRAVENOUS at 05:02

## 2023-01-05 RX ADMIN — HEPARIN SODIUM 5000 UNITS: 5000 INJECTION INTRAVENOUS; SUBCUTANEOUS at 05:02

## 2023-01-05 RX ADMIN — INSULIN DETEMIR 5 UNITS: 100 INJECTION, SOLUTION SUBCUTANEOUS at 12:22

## 2023-01-05 RX ADMIN — ATORVASTATIN CALCIUM 20 MG: 20 TABLET, FILM COATED ORAL at 22:19

## 2023-01-05 RX ADMIN — TAZOBACTAM SODIUM AND PIPERACILLIN SODIUM 3.38 G: 375; 3 INJECTION, SOLUTION INTRAVENOUS at 22:20

## 2023-01-05 RX ADMIN — ACETAMINOPHEN 1000 MG: 500 TABLET, FILM COATED ORAL at 05:02

## 2023-01-05 RX ADMIN — TAMSULOSIN HYDROCHLORIDE 0.4 MG: 0.4 CAPSULE ORAL at 22:19

## 2023-01-05 RX ADMIN — HEPARIN SODIUM 5000 UNITS: 5000 INJECTION INTRAVENOUS; SUBCUTANEOUS at 22:19

## 2023-01-05 RX ADMIN — INSULIN LISPRO 3 UNITS: 100 INJECTION, SOLUTION INTRAVENOUS; SUBCUTANEOUS at 09:01

## 2023-01-05 RX ADMIN — INSULIN LISPRO 2 UNITS: 100 INJECTION, SOLUTION INTRAVENOUS; SUBCUTANEOUS at 16:37

## 2023-01-05 RX ADMIN — HEPARIN SODIUM 5000 UNITS: 5000 INJECTION INTRAVENOUS; SUBCUTANEOUS at 14:23

## 2023-01-05 RX ADMIN — Medication 10 ML: at 09:02

## 2023-01-05 RX ADMIN — ACETAMINOPHEN 1000 MG: 500 TABLET, FILM COATED ORAL at 12:22

## 2023-01-05 RX ADMIN — INSULIN LISPRO 2 UNITS: 100 INJECTION, SOLUTION INTRAVENOUS; SUBCUTANEOUS at 12:22

## 2023-01-05 NOTE — PROGRESS NOTES
[facility]   Urology Progress Note    Patient Name: Cj Cifuentes  : 1950  MRN: [id number]  Primary Care Physician:  Gary Kaur MD  Date of admission: 2023    Subjective   Subjective     Chief Complaint: Scrotal pain    HPI:  Patient sitting up in chair. Pain mildly improved. Low grade fever overnight; 99.2. AM labs pending.    Bladder scan PVR this AM; 60cc.     Review of Systems   All systems were reviewed and negative except for: scrotal pain, scrotal swelling    Objective   Objective     Vitals:   Temp:  [98.3 °F (36.8 °C)-99.2 °F (37.3 °C)] 98.7 °F (37.1 °C)  Heart Rate:  [76-91] 78  Resp:  [16-18] 16  BP: (128-147)/(71-85) 144/83  Physical Exam    Constitutional: Awake in bed, alert   Eyes: PERRLA, sclerae anicteric, no conjunctival injection   HENT: Normocephalic, atraumatic, mucous membranes moist   Neck: Supple, trachea midline   Respiratory: Equal chest rise, non-labored respirations    Cardiovascular: RRR   Gastrointestinal: Soft, incision sites clean dry intact.   Genitourinary: Significant scrotal swelling, right hemiscrotum with edema/soft to palpation, left hemiscrotum mildly tender to palpation with induration. No abscess/wounds. No urethral discharge. Buried penis. Scrotum elevated.    Musculoskeletal: No lower extremity edema bilaterally, no clubbing or cyanosis to extremities   Psychiatric: Appropriate affect, cooperative   Neurologic: Oriented x 3,  Cranial Nerves grossly intact, speech clear   Skin: No rashes     Result Review    Result Review:  I have personally reviewed the results from the time of this admission to 2023 08:35 EST and agree with these findings:  [x]  Laboratory  []  Microbiology  []  Radiology  []  EKG/Telemetry   []  Cardiology/Vascular   []  Pathology  []  Old records  []  Other: Vitals.    Assessment & Plan   Assessment / Plan     Brief Patient Summary:  Cj Cifuentes is a 72 y.o. male who is s/p Robotic-assisted laparoscopic simple  prostatectomy with Dr. Stinson who was readmitted for epididymoorchitis.     Active Hospital Problems:  Active Hospital Problems    Diagnosis    • **Sepsis, due to unspecified organism, unspecified whether acute organ dysfunction present ([facility])    • Essential hypertension    • Type 2 diabetes mellitus with hyperglycemia, without long-term current use of insulin ([facility])    • Obesity (BMI 30-39.9)    • Hernia, abdominal    • ÁNGEL (acute kidney injury) ([facility])      Plan:  -Continue to elevate scrotum  -Bladder scan PVR Q8 hours, notify Urology if >400 cc  -OOB/Ambulate  -Tentative plan to discharge home tomorrow pending labs and if clinically stable.   -Appreciate Hospitalist    D/w Dr. Stinson       DVT prophylaxis:  Medical DVT prophylaxis orders are present.    CODE STATUS:   Code Status (Patient has no pulse and is not breathing): CPR (Attempt to Resuscitate)  Medical Interventions (Patient has pulse or is breathing): Full Support    Disposition:  I expect patient to remain inpatient    Electronically signed by ROCIO Fortune, 01/05/23, 8:35 AM EST.

## 2023-01-05 NOTE — PLAN OF CARE
Goal Outcome Evaluation:  Plan of Care Reviewed With: patient        Progress: improving  Outcome Evaluation: VSS. RA. Pt states he feels like swelling has gone down some, ambulating better, incontinent. Warm compresses applied throught out shift.Sling in place. BS this AM 0. This afternoon 70cc. Pt states he feels like he is voiding better.

## 2023-01-05 NOTE — PLAN OF CARE
Goal Outcome Evaluation:  Plan of Care Reviewed With: patient        Progress: improving  Outcome Evaluation: Pt able to get to EOB independently with use of bedrails and HOB elev. Pt utilized scrotal sling to aid in mobility. Pt t/f on/off bathroom toilet with supervision, then brief and underwear donned in prep for gait. Pt amb in hallway x900ft with RW and CGA, dem wide JAKUB and forward flexed posture. Distance limited by back pain. VSS on RA. Pt needs extra time and effort for all mobility due to scrotal edema and pain as well as incontinence. Pt pleasant and cooperative. Continue POC.
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
## 2023-01-05 NOTE — PROGRESS NOTES
[facility] Medicine Services  PROGRESS NOTE    Patient Name: Cj Cifuentes  : 1950  MRN: [id number]    Date of Admission: 2023  Primary Care Physician: Gary Kaur MD    Subjective   Subjective     CC:  F/U sepsis due to UTI    HPI:  Patient seen and examined. Voiding on his own. PVR's have been ok. RN bladder scanned while I was present in room and nothing in bladder. No fever or chills. Poss DC home tomorrow per Urology.     ROS:  Gen- No fevers, chills  CV- No chest pain, palpitations  Resp- No cough, dyspnea  GI- No N/V/D, abd pain    Objective   Objective     Vital Signs:   Temp:  [98.3 °F (36.8 °C)-99.2 °F (37.3 °C)] 98.7 °F (37.1 °C)  Heart Rate:  [76-91] 82  Resp:  [16-18] 16  BP: (128-147)/(71-85) 144/83     Physical Exam:  Constitutional: No acute distress, awake, alert, sitting up in bed   HENT: NCAT, mucous membranes moist  Respiratory: Respiratory effort normal, Clear to Auscultation B/L   Cardiovascular: RRR, no murmurs  Gastrointestinal: BSx4, non-tender, non-distended   : +scrotal scwelling  Musculoskeletal: No bilateral ankle edema  Psychiatric: Appropriate affect, cooperative  Neurologic: Speech clear  Skin: No rashes on exposed surfaces    Results Reviewed:  LAB RESULTS:      Lab 23  0820 23  0722 23  0602 23  0810 23  1828 23  1322   WBC 22.28* 21.15* 30.46* 42.32*  --  47.57*   HEMOGLOBIN 11.3* 9.9* 9.8* 10.7*  --  12.9*   HEMATOCRIT 33.4* 28.8* 28.7* 32.2*  --  37.6   PLATELETS 354 297 291 301  --  333   NEUTROS ABS 19.31* 18.07* 27.57* 38.24*  --  43.18*   IMMATURE GRANS (ABS) 0.17* 0.58* 0.84* 0.91*  --  1.74*   LYMPHS ABS 1.14 1.11 1.00 1.02  --  0.50*   MONOS ABS 1.37* 1.07* 0.91* 2.00*  --  2.10*   EOS ABS 0.22 0.27 0.09 0.01  --  0.01   MCV 87.2 85.0 85.4 87.3  --  87.0   PROCALCITONIN  --   --   --   --   --  6.79*   LACTATE  --   --   --   --  4.3* 4.2*         Lab 23  0820 23  0722  01/03/23  0602 01/02/23  0810 01/01/23  1322   SODIUM 134* 135* 135* 134* 133*   POTASSIUM 4.2 3.4* 3.8 3.6 3.6   CHLORIDE 99 101 103 100 94*   CO2 25.0 23.0 22.0 20.0* 21.0*   ANION GAP 10.0 11.0 10.0 14.0 18.0*   BUN 9 12 11 10 13   CREATININE 0.62* 0.55* 0.56* 0.72* 1.09   EGFR 102.8 106.6 106.0 98.3 73.0   GLUCOSE 222* 188* 171* 175* 285*   CALCIUM 8.6 8.2* 8.3* 8.6 9.2   MAGNESIUM 1.6  --   --  1.5*  --          Lab 01/01/23  1322   TOTAL PROTEIN 7.4   ALBUMIN 3.7   GLOBULIN 3.7   ALT (SGPT) 39   AST (SGOT) 24   BILIRUBIN 1.6*   ALK PHOS 128*                     Brief Urine Lab Results  (Last result in the past 365 days)      Color   Clarity   Blood   Leuk Est   Nitrite   Protein   CREAT   Urine HCG        01/01/23 1735 Yellow   Cloudy   Large (3+)   Large (3+)   Negative   30 mg/dL (1+)                 Microbiology Results Abnormal     Procedure Component Value - Date/Time    Blood Culture - Blood, Hand, Right [[id number]]  (Normal) Collected: 01/01/23 1322    Lab Status: Preliminary result Specimen: Blood from Hand, Right Updated: 01/04/23 1346     Blood Culture No growth at 3 days    Blood Culture - Blood, Arm, Right [[id number]]  (Normal) Collected: 01/01/23 1322    Lab Status: Preliminary result Specimen: Blood from Arm, Right Updated: 01/04/23 1346     Blood Culture No growth at 3 days    MRSA Screen, PCR (Inpatient) - Swab, Nares [[id number]]  (Normal) Collected: 01/02/23 0102    Lab Status: Final result Specimen: Swab from Nares Updated: 01/02/23 0910     MRSA PCR Negative    Narrative:      The negative predictive value of this diagnostic test is high and should only be used to consider de-escalating anti-MRSA therapy. A positive result may indicate colonization with MRSA and must be correlated clinically.  MRSA Negative    COVID PRE-OP / PRE-PROCEDURE SCREENING ORDER (NO ISOLATION) - Swab, Nasopharynx [[id number]]  (Normal) Collected: 01/01/23 1323    Lab Status: Final result Specimen: Swab from  Nasopharynx Updated: 01/01/23 3835    Narrative:      The following orders were created for panel order COVID PRE-OP / PRE-PROCEDURE SCREENING ORDER (NO ISOLATION) - Swab, Nasopharynx.  Procedure                               Abnormality         Status                     ---------                               -----------         ------                     COVID-19 and FLU A/B PCR...[[id number]]  Normal              Final result                 Please view results for these tests on the individual orders.    COVID-19 and FLU A/B PCR - Swab, Nasopharynx [[id number]]  (Normal) Collected: 01/01/23 1325    Lab Status: Final result Specimen: Swab from Nasopharynx Updated: 01/01/23 2896     COVID19 Not Detected     Influenza A PCR Not Detected     Influenza B PCR Not Detected    Narrative:      Fact sheet for providers: https://www.fda.gov/media/491105/download    Fact sheet for patients: https://www.fda.gov/media/751942/download    Test performed by PCR.          No radiology results from the last 24 hrs        I have reviewed the medications:  Scheduled Meds:acetaminophen, 1,000 mg, Oral, Q8H  atorvastatin, 20 mg, Oral, Nightly  docusate sodium, 100 mg, Oral, BID  heparin (porcine), 5,000 Units, Subcutaneous, Q8H  insulin lispro, 0-7 Units, Subcutaneous, TID AC  levothyroxine, 50 mcg, Oral, Q AM  piperacillin-tazobactam, 3.375 g, Intravenous, Q8H  senna-docusate sodium, 2 tablet, Oral, BID  sodium chloride, 10 mL, Intravenous, Q12H  tamsulosin, 0.4 mg, Oral, Nightly      Continuous Infusions:   PRN Meds:.•  senna-docusate sodium **AND** polyethylene glycol **AND** bisacodyl **AND** bisacodyl  •  cyclobenzaprine  •  dextrose  •  dextrose  •  glucagon (human recombinant)  •  HYDROmorphone  •  hyoscyamine sulfate  •  magnesium sulfate **OR** magnesium sulfate **OR** magnesium sulfate  •  melatonin  •  ondansetron  •  oxybutynin  •  oxyCODONE  •  phenazopyridine  •  potassium chloride **OR** potassium chloride **OR**  potassium chloride  •  [COMPLETED] Insert Peripheral IV **AND** sodium chloride  •  sodium chloride  •  sodium chloride    Assessment & Plan   Assessment & Plan     Active Hospital Problems    Diagnosis  POA   • **Sepsis, due to unspecified organism, unspecified whether acute organ dysfunction present (HCC) [A41.9]  Yes   • Essential hypertension [I10]  Yes   • Type 2 diabetes mellitus with hyperglycemia, without long-term current use of insulin (HCC) [E11.65]  Yes   • Obesity (BMI 30-39.9) [E66.9]  Yes   • Hernia, abdominal [K46.9]  Yes   • ÁNGEL (acute kidney injury) (HCC) [N17.9]  Yes      Resolved Hospital Problems   No resolved problems to display.        Brief Hospital Course to date:  Cj Cifuentes is a 72 y.o. male with history of BPH, acute urinary retention, DM2, hypothyroidism, HTN, HLD GERD and sleep apnea who had a recent prostatectomy and presented to the ED due to fever and was found to have sepsis due to UTI.    This patient's problems and plans were partially entered by my partner and updated as appropriate by me 01/05/23.    Sepsis due to UTI  -Urine culture with pseudomonas.  Continue Zosyn while inpatient. WBC count stable  -Plan to DC on oral Levaquin   -Urology following, poss DC home tomorrow     DM2  -A1c 7.0%  -SSI  -Add 5 units Levemir daily     ÁNGEL, mild  -Resolved with IV fluids    Hypothyroidism  -Continue synthroid    Hypokalemia, mild  -Replace per protocol     Expected Discharge Location and Transportation: Home  Expected Discharge 1/6/23  Expected Discharge Date and Time     Expected Discharge Date Expected Discharge Time    Jan 5, 2023            DVT prophylaxis:  Medical DVT prophylaxis orders are present.     AM-PAC 6 Clicks Score (PT): 18 (01/04/23 2000)    CODE STATUS:   Code Status and Medical Interventions:   Ordered at: 01/01/23 1801     Code Status (Patient has no pulse and is not breathing):    CPR (Attempt to Resuscitate)     Medical Interventions (Patient has pulse or is  breathing):    Full Support       Carina Cuba, DO  01/05/23

## 2023-01-05 NOTE — PLAN OF CARE
Problem: Adult Inpatient Plan of Care  Goal: Plan of Care Review  Outcome: Ongoing, Progressing  Flowsheets (Taken 1/5/2023 0632)  Outcome Evaluation: VSS.  A&Ox4.  Oxycodone given due to low back pain which was effective.  Bladder scan this am showed 420 ml and after voiding, PVR was 60 ml.  No other complaints at this time.  Goal: Patient-Specific Goal (Individualized)  Outcome: Ongoing, Progressing  Goal: Absence of Hospital-Acquired Illness or Injury  Outcome: Ongoing, Progressing  Intervention: Identify and Manage Fall Risk  Recent Flowsheet Documentation  Taken 1/5/2023 0600 by Roselyn Mccarthy, RN  Safety Promotion/Fall Prevention:   assistive device/personal items within reach   clutter free environment maintained   fall prevention program maintained   nonskid shoes/slippers when out of bed   room organization consistent   safety round/check completed  Taken 1/5/2023 0400 by Roselyn Mccarthy, RN  Safety Promotion/Fall Prevention:   assistive device/personal items within reach   clutter free environment maintained   fall prevention program maintained   nonskid shoes/slippers when out of bed   room organization consistent   safety round/check completed  Taken 1/5/2023 0200 by Roselyn Mccarthy, RN  Safety Promotion/Fall Prevention:   assistive device/personal items within reach   clutter free environment maintained   fall prevention program maintained   nonskid shoes/slippers when out of bed   room organization consistent   safety round/check completed  Taken 1/5/2023 0000 by Roselyn Mccarthy, RN  Safety Promotion/Fall Prevention:   assistive device/personal items within reach   clutter free environment maintained   fall prevention program maintained   nonskid shoes/slippers when out of bed   room organization consistent   safety round/check completed  Taken 1/4/2023 2200 by Roselyn Mccarthy, RN  Safety Promotion/Fall Prevention:   assistive device/personal items within reach   clutter free environment maintained   fall  prevention program maintained   nonskid shoes/slippers when out of bed   room organization consistent   safety round/check completed  Taken 1/4/2023 2000 by Roselyn Mccarthy RN  Safety Promotion/Fall Prevention:   assistive device/personal items within reach   clutter free environment maintained   fall prevention program maintained   nonskid shoes/slippers when out of bed   safety round/check completed   room organization consistent  Intervention: Prevent Skin Injury  Recent Flowsheet Documentation  Taken 1/5/2023 0600 by Roselyn Mccarthy RN  Body Position: position changed independently  Skin Protection:   adhesive use limited   incontinence pads utilized   protective footwear used   tubing/devices free from skin contact  Taken 1/5/2023 0400 by Roselyn Mccarthy RN  Body Position: position changed independently  Skin Protection:   adhesive use limited   incontinence pads utilized   protective footwear used   tubing/devices free from skin contact  Taken 1/5/2023 0200 by Roselyn Mccarthy RN  Body Position: position changed independently  Skin Protection:   adhesive use limited   incontinence pads utilized   protective footwear used   tubing/devices free from skin contact  Taken 1/5/2023 0000 by Roselyn Mccarthy RN  Body Position: position changed independently  Skin Protection:   adhesive use limited   incontinence pads utilized   protective footwear used   tubing/devices free from skin contact  Taken 1/4/2023 2200 by Roselyn Mccarthy RN  Body Position: position changed independently  Skin Protection:   adhesive use limited   incontinence pads utilized   protective footwear used   tubing/devices free from skin contact  Taken 1/4/2023 2000 by Roselyn Mccarthy RN  Body Position: position changed independently  Skin Protection:   adhesive use limited   incontinence pads utilized   protective footwear used   tubing/devices free from skin contact  Intervention: Prevent and Manage VTE (Venous Thromboembolism) Risk  Recent Flowsheet  Documentation  Taken 1/5/2023 0600 by Roselyn Mccarthy RN  Activity Management:   activity adjusted per tolerance   activity encouraged  Taken 1/5/2023 0400 by Roselyn Mccarthy RN  Activity Management:   activity adjusted per tolerance   activity encouraged  Taken 1/5/2023 0200 by Roselyn Mccarthy RN  Activity Management:   activity adjusted per tolerance   activity encouraged  Taken 1/5/2023 0000 by Roselyn Mccarthy RN  Activity Management:   activity adjusted per tolerance   activity encouraged  Taken 1/4/2023 2200 by Roselyn Mccarthy RN  Activity Management: activity encouraged  Taken 1/4/2023 2000 by Roselyn Mccarthy RN  Activity Management:   activity adjusted per tolerance   activity encouraged  VTE Prevention/Management:   bilateral   sequential compression devices off  Range of Motion: active ROM (range of motion) encouraged  Intervention: Prevent Infection  Recent Flowsheet Documentation  Taken 1/5/2023 0600 by Roselyn Mccarthy RN  Infection Prevention:   environmental surveillance performed   hand hygiene promoted   personal protective equipment utilized   rest/sleep promoted   single patient room provided  Taken 1/5/2023 0400 by Roselyn Mccarthy RN  Infection Prevention:   environmental surveillance performed   hand hygiene promoted   personal protective equipment utilized   rest/sleep promoted   single patient room provided  Taken 1/5/2023 0200 by Roselyn Mccarthy RN  Infection Prevention:   environmental surveillance performed   hand hygiene promoted   personal protective equipment utilized   rest/sleep promoted   single patient room provided  Taken 1/5/2023 0000 by Roselyn Mccarthy RN  Infection Prevention:   environmental surveillance performed   hand hygiene promoted   personal protective equipment utilized   rest/sleep promoted   single patient room provided  Taken 1/4/2023 2200 by Roselyn Mccarthy RN  Infection Prevention:   environmental surveillance performed   hand hygiene promoted   personal protective equipment utilized    rest/sleep promoted   single patient room provided  Taken 1/4/2023 2000 by Roselyn Mccarthy RN  Infection Prevention:   environmental surveillance performed   hand hygiene promoted   personal protective equipment utilized   rest/sleep promoted   single patient room provided  Goal: Optimal Comfort and Wellbeing  Outcome: Ongoing, Progressing  Intervention: Monitor Pain and Promote Comfort  Recent Flowsheet Documentation  Taken 1/5/2023 0242 by Roselyn Mccarthy, RN  Pain Management Interventions: see MAR  Intervention: Provide Person-Centered Care  Recent Flowsheet Documentation  Taken 1/4/2023 2000 by Roselyn Mccarthy RN  Trust Relationship/Rapport:   care explained   emotional support provided   empathic listening provided   questions answered   questions encouraged   thoughts/feelings acknowledged  Goal: Readiness for Transition of Care  Outcome: Ongoing, Progressing     Problem: Fall Injury Risk  Goal: Absence of Fall and Fall-Related Injury  Outcome: Ongoing, Progressing  Intervention: Identify and Manage Contributors  Recent Flowsheet Documentation  Taken 1/4/2023 2000 by Roselyn Mccarthy RN  Medication Review/Management: medications reviewed  Intervention: Promote Injury-Free Environment  Recent Flowsheet Documentation  Taken 1/5/2023 0600 by Roselyn Mccarthy, RN  Safety Promotion/Fall Prevention:   assistive device/personal items within reach   clutter free environment maintained   fall prevention program maintained   nonskid shoes/slippers when out of bed   room organization consistent   safety round/check completed  Taken 1/5/2023 0400 by Roselyn Mccarthy RN  Safety Promotion/Fall Prevention:   assistive device/personal items within reach   clutter free environment maintained   fall prevention program maintained   nonskid shoes/slippers when out of bed   room organization consistent   safety round/check completed  Taken 1/5/2023 0200 by Roselyn Mccarthy, RN  Safety Promotion/Fall Prevention:   assistive device/personal items within  reach   clutter free environment maintained   fall prevention program maintained   nonskid shoes/slippers when out of bed   room organization consistent   safety round/check completed  Taken 1/5/2023 0000 by Roselyn Mccarthy, RN  Safety Promotion/Fall Prevention:   assistive device/personal items within reach   clutter free environment maintained   fall prevention program maintained   nonskid shoes/slippers when out of bed   room organization consistent   safety round/check completed  Taken 1/4/2023 2200 by Roselyn Mccarthy, RN  Safety Promotion/Fall Prevention:   assistive device/personal items within reach   clutter free environment maintained   fall prevention program maintained   nonskid shoes/slippers when out of bed   room organization consistent   safety round/check completed  Taken 1/4/2023 2000 by Roselyn Mccarthy, RN  Safety Promotion/Fall Prevention:   assistive device/personal items within reach   clutter free environment maintained   fall prevention program maintained   nonskid shoes/slippers when out of bed   safety round/check completed   room organization consistent     Problem: Adjustment to Illness (Sepsis/Septic Shock)  Goal: Optimal Coping  Outcome: Ongoing, Progressing  Intervention: Optimize Psychosocial Adjustment to Illness  Recent Flowsheet Documentation  Taken 1/4/2023 2000 by Roselyn Mccarthy, RN  Family/Support System Care:   self-care encouraged   support provided     Problem: Bleeding (Sepsis/Septic Shock)  Goal: Absence of Bleeding  Outcome: Ongoing, Progressing     Problem: Glycemic Control Impaired (Sepsis/Septic Shock)  Goal: Blood Glucose Level Within Desired Range  Outcome: Ongoing, Progressing     Problem: Infection Progression (Sepsis/Septic Shock)  Goal: Absence of Infection Signs and Symptoms  Outcome: Ongoing, Progressing  Intervention: Initiate Sepsis Management  Recent Flowsheet Documentation  Taken 1/5/2023 0600 by Roselyn Mccarthy, RN  Infection Prevention:   environmental surveillance  performed   hand hygiene promoted   personal protective equipment utilized   rest/sleep promoted   single patient room provided  Taken 1/5/2023 0400 by Roselyn Mccarthy RN  Infection Prevention:   environmental surveillance performed   hand hygiene promoted   personal protective equipment utilized   rest/sleep promoted   single patient room provided  Taken 1/5/2023 0200 by Roselyn Mccarthy RN  Infection Prevention:   environmental surveillance performed   hand hygiene promoted   personal protective equipment utilized   rest/sleep promoted   single patient room provided  Taken 1/5/2023 0000 by Roselyn Mccarthy RN  Infection Prevention:   environmental surveillance performed   hand hygiene promoted   personal protective equipment utilized   rest/sleep promoted   single patient room provided  Taken 1/4/2023 2200 by Roselyn Mccarthy RN  Infection Prevention:   environmental surveillance performed   hand hygiene promoted   personal protective equipment utilized   rest/sleep promoted   single patient room provided  Taken 1/4/2023 2000 by Roselyn Mccarthy RN  Infection Prevention:   environmental surveillance performed   hand hygiene promoted   personal protective equipment utilized   rest/sleep promoted   single patient room provided  Intervention: Promote Recovery  Recent Flowsheet Documentation  Taken 1/5/2023 0600 by Roselyn Mccarthy RN  Activity Management:   activity adjusted per tolerance   activity encouraged  Taken 1/5/2023 0400 by Roselyn Mccarthy RN  Activity Management:   activity adjusted per tolerance   activity encouraged  Taken 1/5/2023 0200 by Roselyn Mccarthy RN  Activity Management:   activity adjusted per tolerance   activity encouraged  Taken 1/5/2023 0000 by Roselyn Mccarthy RN  Activity Management:   activity adjusted per tolerance   activity encouraged  Taken 1/4/2023 2200 by Roselyn Mccarthy, RN  Activity Management: activity encouraged  Taken 1/4/2023 2000 by Roselyn Mccarthy, RN  Activity Management:   activity adjusted per  tolerance   activity encouraged     Problem: Nutrition Impaired (Sepsis/Septic Shock)  Goal: Optimal Nutrition Intake  Outcome: Ongoing, Progressing     Problem: UTI (Urinary Tract Infection)  Goal: Improved Infection Symptoms  Outcome: Ongoing, Progressing   Goal Outcome Evaluation:              Outcome Evaluation: VSS.  A&Ox4.  Oxycodone given due to low back pain which was effective.  Bladder scan this am showed 420 ml and after voiding, PVR was 60 ml.  No other complaints at this time.

## 2023-01-06 ENCOUNTER — APPOINTMENT (OUTPATIENT)
Dept: ULTRASOUND IMAGING | Facility: HOSPITAL | Age: 73
DRG: 698 | End: 2023-01-06
Payer: MEDICARE

## 2023-01-06 LAB
ANION GAP SERPL CALCULATED.3IONS-SCNC: 13 MMOL/L (ref 5–15)
BACTERIA SPEC AEROBE CULT: NORMAL
BACTERIA SPEC AEROBE CULT: NORMAL
BASOPHILS # BLD AUTO: 0.09 10*3/MM3 (ref 0–0.2)
BASOPHILS NFR BLD AUTO: 0.4 % (ref 0–1.5)
BUN SERPL-MCNC: 10 MG/DL (ref 8–23)
BUN/CREAT SERPL: 16.7 (ref 7–25)
CALCIUM SPEC-SCNC: 8.6 MG/DL (ref 8.6–10.5)
CHLORIDE SERPL-SCNC: 98 MMOL/L (ref 98–107)
CO2 SERPL-SCNC: 23 MMOL/L (ref 22–29)
CREAT SERPL-MCNC: 0.6 MG/DL (ref 0.76–1.27)
DEPRECATED RDW RBC AUTO: 42.5 FL (ref 37–54)
EGFRCR SERPLBLD CKD-EPI 2021: 103.8 ML/MIN/1.73
EOSINOPHIL # BLD AUTO: 0.31 10*3/MM3 (ref 0–0.4)
EOSINOPHIL NFR BLD AUTO: 1.3 % (ref 0.3–6.2)
ERYTHROCYTE [DISTWIDTH] IN BLOOD BY AUTOMATED COUNT: 13.6 % (ref 12.3–15.4)
GLUCOSE BLDC GLUCOMTR-MCNC: 139 MG/DL (ref 70–130)
GLUCOSE BLDC GLUCOMTR-MCNC: 145 MG/DL (ref 70–130)
GLUCOSE BLDC GLUCOMTR-MCNC: 167 MG/DL (ref 70–130)
GLUCOSE SERPL-MCNC: 137 MG/DL (ref 65–99)
HCT VFR BLD AUTO: 32.2 % (ref 37.5–51)
HGB BLD-MCNC: 11.1 G/DL (ref 13–17.7)
IMM GRANULOCYTES # BLD AUTO: 0.33 10*3/MM3 (ref 0–0.05)
IMM GRANULOCYTES NFR BLD AUTO: 1.4 % (ref 0–0.5)
LYMPHOCYTES # BLD AUTO: 1.46 10*3/MM3 (ref 0.7–3.1)
LYMPHOCYTES NFR BLD AUTO: 6.2 % (ref 19.6–45.3)
MCH RBC QN AUTO: 29.5 PG (ref 26.6–33)
MCHC RBC AUTO-ENTMCNC: 34.5 G/DL (ref 31.5–35.7)
MCV RBC AUTO: 85.6 FL (ref 79–97)
MONOCYTES # BLD AUTO: 1.34 10*3/MM3 (ref 0.1–0.9)
MONOCYTES NFR BLD AUTO: 5.7 % (ref 5–12)
NEUTROPHILS NFR BLD AUTO: 19.98 10*3/MM3 (ref 1.7–7)
NEUTROPHILS NFR BLD AUTO: 85 % (ref 42.7–76)
NRBC BLD AUTO-RTO: 0 /100 WBC (ref 0–0.2)
PLATELET # BLD AUTO: 375 10*3/MM3 (ref 140–450)
PMV BLD AUTO: 9 FL (ref 6–12)
POTASSIUM SERPL-SCNC: 3.9 MMOL/L (ref 3.5–5.2)
RBC # BLD AUTO: 3.76 10*6/MM3 (ref 4.14–5.8)
SODIUM SERPL-SCNC: 134 MMOL/L (ref 136–145)
WBC NRBC COR # BLD: 23.51 10*3/MM3 (ref 3.4–10.8)

## 2023-01-06 PROCEDURE — 25010000002 PIPERACILLIN SOD-TAZOBACTAM PER 1 G: Performed by: NURSE PRACTITIONER

## 2023-01-06 PROCEDURE — 99231 SBSQ HOSP IP/OBS SF/LOW 25: CPT | Performed by: FAMILY MEDICINE

## 2023-01-06 PROCEDURE — 99024 POSTOP FOLLOW-UP VISIT: CPT | Performed by: NURSE PRACTITIONER

## 2023-01-06 PROCEDURE — 82962 GLUCOSE BLOOD TEST: CPT

## 2023-01-06 PROCEDURE — 76870 US EXAM SCROTUM: CPT

## 2023-01-06 PROCEDURE — 80048 BASIC METABOLIC PNL TOTAL CA: CPT | Performed by: FAMILY MEDICINE

## 2023-01-06 PROCEDURE — 25010000002 HEPARIN (PORCINE) PER 1000 UNITS: Performed by: NURSE PRACTITIONER

## 2023-01-06 PROCEDURE — 63710000001 INSULIN LISPRO (HUMAN) PER 5 UNITS: Performed by: NURSE PRACTITIONER

## 2023-01-06 PROCEDURE — 85025 COMPLETE CBC W/AUTO DIFF WBC: CPT | Performed by: FAMILY MEDICINE

## 2023-01-06 PROCEDURE — 63710000001 INSULIN DETEMIR PER 5 UNITS: Performed by: FAMILY MEDICINE

## 2023-01-06 RX ORDER — LEVOFLOXACIN 750 MG/1
750 TABLET ORAL DAILY
Status: DISCONTINUED | OUTPATIENT
Start: 2023-01-06 | End: 2023-01-09 | Stop reason: HOSPADM

## 2023-01-06 RX ADMIN — TAZOBACTAM SODIUM AND PIPERACILLIN SODIUM 3.38 G: 375; 3 INJECTION, SOLUTION INTRAVENOUS at 05:50

## 2023-01-06 RX ADMIN — INSULIN LISPRO 2 UNITS: 100 INJECTION, SOLUTION INTRAVENOUS; SUBCUTANEOUS at 08:43

## 2023-01-06 RX ADMIN — INSULIN DETEMIR 5 UNITS: 100 INJECTION, SOLUTION SUBCUTANEOUS at 08:44

## 2023-01-06 RX ADMIN — ATORVASTATIN CALCIUM 20 MG: 20 TABLET, FILM COATED ORAL at 20:45

## 2023-01-06 RX ADMIN — ACETAMINOPHEN 1000 MG: 500 TABLET, FILM COATED ORAL at 05:50

## 2023-01-06 RX ADMIN — HEPARIN SODIUM 5000 UNITS: 5000 INJECTION INTRAVENOUS; SUBCUTANEOUS at 14:48

## 2023-01-06 RX ADMIN — ACETAMINOPHEN 1000 MG: 500 TABLET, FILM COATED ORAL at 14:48

## 2023-01-06 RX ADMIN — HEPARIN SODIUM 5000 UNITS: 5000 INJECTION INTRAVENOUS; SUBCUTANEOUS at 05:56

## 2023-01-06 RX ADMIN — TAZOBACTAM SODIUM AND PIPERACILLIN SODIUM 3.38 G: 375; 3 INJECTION, SOLUTION INTRAVENOUS at 14:48

## 2023-01-06 RX ADMIN — HEPARIN SODIUM 5000 UNITS: 5000 INJECTION INTRAVENOUS; SUBCUTANEOUS at 20:45

## 2023-01-06 RX ADMIN — Medication 10 ML: at 20:46

## 2023-01-06 RX ADMIN — LEVOTHYROXINE SODIUM 50 MCG: 50 TABLET ORAL at 05:50

## 2023-01-06 RX ADMIN — TAMSULOSIN HYDROCHLORIDE 0.4 MG: 0.4 CAPSULE ORAL at 20:45

## 2023-01-06 RX ADMIN — ACETAMINOPHEN 1000 MG: 500 TABLET, FILM COATED ORAL at 20:45

## 2023-01-06 RX ADMIN — LEVOFLOXACIN 750 MG: 750 TABLET, FILM COATED ORAL at 16:20

## 2023-01-06 NOTE — PAYOR COMM NOTE
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 due to UTI    HPI:  Patient seen and examined. Voiding on his own. PVR's have been ok. RN bladder scanned while I was present in room and nothing in bladder. No fever or chills. Poss DC home tomorrow per Urology.     ROS:  Gen- No fevers, chills  CV- No chest pain, palpitations  Resp- No cough, dyspnea  GI- No N/V/D, abd pain    Objective   Objective     Vital Signs:   Temp:  [98.3 °F (36.8 °C)-99.2 °F (37.3 °C)] 98.7 °F (37.1 °C)  Heart Rate:  [76-91] 82  Resp:  [16-18] 16  BP: (128-147)/(71-85) 144/83     Physical Exam:  Constitutional: No acute distress, awake, alert, sitting up in bed   HENT: NCAT, mucous membranes moist  Respiratory: Respiratory effort normal, Clear to Auscultation B/L   Cardiovascular: RRR, no murmurs  Gastrointestinal: BSx4, non-tender, non-distended   : +scrotal scwelling  Musculoskeletal: No bilateral ankle  edema  Psychiatric: Appropriate affect, cooperative  Neurologic: Speech clear  Skin: No rashes on exposed surfaces    Results Reviewed:  LAB RESULTS:      Lab 01/05/23  0820 01/04/23  0722 01/03/23  0602 01/02/23  0810 01/01/23  1828 01/01/23  1322   WBC 22.28* 21.15* 30.46* 42.32*  --  47.57*   HEMOGLOBIN 11.3* 9.9* 9.8* 10.7*  --  12.9*   HEMATOCRIT 33.4* 28.8* 28.7* 32.2*  --  37.6   PLATELETS 354 297 291 301  --  333   NEUTROS ABS 19.31* 18.07* 27.57* 38.24*  --  43.18*   IMMATURE GRANS (ABS) 0.17* 0.58* 0.84* 0.91*  --  1.74*   LYMPHS ABS 1.14 1.11 1.00 1.02  --  0.50*   MONOS ABS 1.37* 1.07* 0.91* 2.00*  --  2.10*   EOS ABS 0.22 0.27 0.09 0.01  --  0.01   MCV 87.2 85.0 85.4 87.3  --  87.0   PROCALCITONIN  --   --   --   --   --  6.79*   LACTATE  --   --   --   --  4.3* 4.2*         Lab 01/05/23  0820 01/04/23  0722 01/03/23  0602 01/02/23  0810 01/01/23  1322   SODIUM 134* 135* 135* 134* 133*   POTASSIUM 4.2 3.4* 3.8 3.6 3.6   CHLORIDE 99 101 103 100 94*   CO2 25.0 23.0 22.0 20.0* 21.0*   ANION GAP 10.0 11.0 10.0 14.0 18.0*   BUN 9 12 11 10 13   CREATININE 0.62* 0.55* 0.56* 0.72* 1.09   EGFR 102.8 106.6 106.0 98.3 73.0   GLUCOSE 222* 188* 171* 175* 285*   CALCIUM 8.6 8.2* 8.3* 8.6 9.2   MAGNESIUM 1.6  --   --  1.5*  --          Lab 01/01/23  1322   TOTAL PROTEIN 7.4   ALBUMIN 3.7   GLOBULIN 3.7   ALT (SGPT) 39   AST (SGOT) 24   BILIRUBIN 1.6*   ALK PHOS 128*                     Brief Urine Lab Results  (Last result in the past 365 days)      Color   Clarity   Blood   Leuk Est   Nitrite   Protein   CREAT   Urine HCG        01/01/23 1735 Yellow   Cloudy   Large (3+)   Large (3+)   Negative   30 mg/dL (1+)                 Microbiology Results Abnormal     Procedure Component Value - Date/Time    Blood Culture - Blood, Hand, Right [[id number]]  (Normal) Collected: 01/01/23 1322    Lab Status: Preliminary result Specimen: Blood from Hand, Right Updated: 01/04/23 1346     Blood Culture No growth at 3 days    Blood  Culture - Blood, Arm, Right [[id number]]  (Normal) Collected: 01/01/23 1322    Lab Status: Preliminary result Specimen: Blood from Arm, Right Updated: 01/04/23 1346     Blood Culture No growth at 3 days    MRSA Screen, PCR (Inpatient) - Swab, Nares [[id number]]  (Normal) Collected: 01/02/23 0102    Lab Status: Final result Specimen: Swab from Nares Updated: 01/02/23 0910     MRSA PCR Negative    Narrative:      The negative predictive value of this diagnostic test is high and should only be used to consider de-escalating anti-MRSA therapy. A positive result may indicate colonization with MRSA and must be correlated clinically.  MRSA Negative    COVID PRE-OP / PRE-PROCEDURE SCREENING ORDER (NO ISOLATION) - Swab, Nasopharynx [[id number]]  (Normal) Collected: 01/01/23 1325    Lab Status: Final result Specimen: Swab from Nasopharynx Updated: 01/01/23 1357    Narrative:      The following orders were created for panel order COVID PRE-OP / PRE-PROCEDURE SCREENING ORDER (NO ISOLATION) - Swab, Nasopharynx.  Procedure                               Abnormality         Status                     ---------                               -----------         ------                     COVID-19 and FLU A/B PCR...[[id number]]  Normal              Final result                 Please view results for these tests on the individual orders.    COVID-19 and FLU A/B PCR - Swab, Nasopharynx [[id number]]  (Normal) Collected: 01/01/23 1325    Lab Status: Final result Specimen: Swab from Nasopharynx Updated: 01/01/23 1357     COVID19 Not Detected     Influenza A PCR Not Detected     Influenza B PCR Not Detected    Narrative:      Fact sheet for providers: https://www.fda.gov/media/395782/download    Fact sheet for patients: https://www.fda.gov/media/959108/download    Test performed by PCR.          No radiology results from the last 24 hrs        I have reviewed the medications:  Scheduled Meds:acetaminophen, 1,000 mg, Oral, Q8H  atorvastatin, 20  mg, Oral, Nightly  docusate sodium, 100 mg, Oral, BID  heparin (porcine), 5,000 Units, Subcutaneous, Q8H  insulin lispro, 0-7 Units, Subcutaneous, TID AC  levothyroxine, 50 mcg, Oral, Q AM  piperacillin-tazobactam, 3.375 g, Intravenous, Q8H  senna-docusate sodium, 2 tablet, Oral, BID  sodium chloride, 10 mL, Intravenous, Q12H  tamsulosin, 0.4 mg, Oral, Nightly      Continuous Infusions:   PRN Meds:.•  senna-docusate sodium **AND** polyethylene glycol **AND** bisacodyl **AND** bisacodyl  •  cyclobenzaprine  •  dextrose  •  dextrose  •  glucagon (human recombinant)  •  HYDROmorphone  •  hyoscyamine sulfate  •  magnesium sulfate **OR** magnesium sulfate **OR** magnesium sulfate  •  melatonin  •  ondansetron  •  oxybutynin  •  oxyCODONE  •  phenazopyridine  •  potassium chloride **OR** potassium chloride **OR** potassium chloride  •  [COMPLETED] Insert Peripheral IV **AND** sodium chloride  •  sodium chloride  •  sodium chloride    Assessment & Plan   Assessment & Plan     Active Hospital Problems    Diagnosis  POA   • **Sepsis, due to unspecified organism, unspecified whether acute organ dysfunction present (HCC) [A41.9]  Yes   • Essential hypertension [I10]  Yes   • Type 2 diabetes mellitus with hyperglycemia, without long-term current use of insulin (HCC) [E11.65]  Yes   • Obesity (BMI 30-39.9) [E66.9]  Yes   • Hernia, abdominal [K46.9]  Yes   • ÁNGEL (acute kidney injury) (HCC) [N17.9]  Yes      Resolved Hospital Problems   No resolved problems to display.        Brief Hospital Course to date:  Cj Cifuentes is a 72 y.o. male with history of BPH, acute urinary retention, DM2, hypothyroidism, HTN, HLD GERD and sleep apnea who had a recent prostatectomy and presented to the ED due to fever and was found to have sepsis due to UTI.    This patient's problems and plans were partially entered by my partner and updated as appropriate by me 01/05/23.    Sepsis due to UTI  -Urine culture with pseudomonas.  Continue Zosyn  while inpatient. WBC count stable  -Plan to DC on oral Levaquin   -Urology following, poss DC home tomorrow     DM2  -A1c 7.0%  -SSI  -Add 5 units Levemir daily     ÁNGEL, mild  -Resolved with IV fluids    Hypothyroidism  -Continue synthroid    Hypokalemia, mild  -Replace per protocol     Expected Discharge Location and Transportation: Home  Expected Discharge 23  Expected Discharge Date and Time     Expected Discharge Date Expected Discharge Time    2023            DVT prophylaxis:  Medical DVT prophylaxis orders are present.     AM-PAC 6 Clicks Score (PT): 18 (23)    CODE STATUS:   Code Status and Medical Interventions:   Ordered at: 23 1801     Code Status (Patient has no pulse and is not breathing):    CPR (Attempt to Resuscitate)     Medical Interventions (Patient has pulse or is breathing):    Full Support       Carina Cuba DO  23              Electronically signed by Carina Cuba DO at 23 1006     Maria Eugenia Shine APRN at 23 0835     Attestation signed by Tuyet Stinson MD at 23 8024    I have reviewed this documentation and agree.  I  have seen and examined the patient.  His exam is improving.  His WBC is 22k today.    I discussed our plan with him.  He had a low grade fever.  We will plan on continuing his Zosyn for now.   Unfortunately his WBC did not continue to trend down today.  I would prefer his WBC to return to normal if not continue to trend down before discharge.               Summary:Urology Progress                [facility]   Urology Progress Note    Patient Name: Cj Cifuentes  : 1950  MRN: [id number]  Primary Care Physician:  Gary Kaur MD  Date of admission: 2023    Subjective   Subjective     Chief Complaint: Scrotal pain    HPI:  Patient sitting up in chair. Pain mildly improved. Low grade fever overnight; 99.2. AM labs pending.    Bladder scan PVR this AM; 60cc.     Review of Systems   All systems  were reviewed and negative except for: scrotal pain, scrotal swelling    Objective   Objective     Vitals:   Temp:  [98.3 °F (36.8 °C)-99.2 °F (37.3 °C)] 98.7 °F (37.1 °C)  Heart Rate:  [76-91] 78  Resp:  [16-18] 16  BP: (128-147)/(71-85) 144/83  Physical Exam    Constitutional: Awake in bed, alert   Eyes: PERRLA, sclerae anicteric, no conjunctival injection   HENT: Normocephalic, atraumatic, mucous membranes moist   Neck: Supple, trachea midline   Respiratory: Equal chest rise, non-labored respirations    Cardiovascular: RRR   Gastrointestinal: Soft, incision sites clean dry intact.   Genitourinary: Significant scrotal swelling, right hemiscrotum with edema/soft to palpation, left hemiscrotum mildly tender to palpation with induration. No abscess/wounds. No urethral discharge. Buried penis. Scrotum elevated.    Musculoskeletal: No lower extremity edema bilaterally, no clubbing or cyanosis to extremities   Psychiatric: Appropriate affect, cooperative   Neurologic: Oriented x 3,  Cranial Nerves grossly intact, speech clear   Skin: No rashes     Result Review    Result Review:  I have personally reviewed the results from the time of this admission to 1/5/2023 08:35 EST and agree with these findings:  [x]  Laboratory  []  Microbiology  []  Radiology  []  EKG/Telemetry   []  Cardiology/Vascular   []  Pathology  []  Old records  []  Other: Vitals.    Assessment & Plan   Assessment / Plan     Brief Patient Summary:  Cj Cifuentes is a 72 y.o. male who is s/p Robotic-assisted laparoscopic simple prostatectomy with Dr. Stinson who was readmitted for epididymoorchitis.     Active Hospital Problems:  Active Hospital Problems    Diagnosis    • **Sepsis, due to unspecified organism, unspecified whether acute organ dysfunction present (HCC)    • Essential hypertension    • Type 2 diabetes mellitus with hyperglycemia, without long-term current use of insulin (HCC)    • Obesity (BMI 30-39.9)    • Hernia, abdominal    • ÁNGEL  (acute kidney injury) (HCC)      Plan:  -Continue to elevate scrotum  -Bladder scan PVR Q8 hours, notify Urology if >400 cc  -OOB/Ambulate  -Tentative plan to discharge home tomorrow pending labs and if clinically stable.   -Appreciate Hospitalist    D/w Dr. Stinson       DVT prophylaxis:  Medical DVT prophylaxis orders are present.    CODE STATUS:   Code Status (Patient has no pulse and is not breathing): CPR (Attempt to Resuscitate)  Medical Interventions (Patient has pulse or is breathing): Full Support    Disposition:  I expect patient to remain inpatient    Electronically signed by ROCIO Fortune, 23, 8:35 AM EST.             Electronically signed by Tuyet Stinson MD at 23 1704     Carina Cuba DO at 23 0963              [facility] Medicine Services  PROGRESS NOTE    Patient Name: Cj Cifuentes  : 1950  MRN: [id number]    Date of Admission: 2023  Primary Care Physician: Gary Kaur MD    Subjective   Subjective     CC:  F/U sepsis due to UTI    HPI:  Patient seen and examined. He has been able to void. Also had a BM. He states when he coughs, he has some urine leakage. No fevers or chills.     ROS:  Gen- No fevers, chills  CV- No chest pain, palpitations  Resp- No cough, dyspnea  GI- No N/V/D, abd pain    Objective   Objective     Vital Signs:   Temp:  [97.9 °F (36.6 °C)-100 °F (37.8 °C)] 97.9 °F (36.6 °C)  Heart Rate:  [] 79  Resp:  [16-18] 18  BP: (126-149)/(75-84) 126/75     Physical Exam:  Constitutional: No acute distress, awake, alert, sitting up in bed   HENT: NCAT, mucous membranes moist  Respiratory: Respiratory effort normal, Clear to Auscultation B/L   Cardiovascular: RRR, no murmurs  Gastrointestinal: BSx4, non-tender, non-distended   : Mod scrotal scwelling  Musculoskeletal: No bilateral ankle edema  Psychiatric: Appropriate affect, cooperative  Neurologic: Speech clear  Skin: No rashes on exposed  surfaces    Results Reviewed:  LAB RESULTS:      Lab 01/04/23  0722 01/03/23  0602 01/02/23  0810 01/01/23  1828 01/01/23  1322   WBC 21.15* 30.46* 42.32*  --  47.57*   HEMOGLOBIN 9.9* 9.8* 10.7*  --  12.9*   HEMATOCRIT 28.8* 28.7* 32.2*  --  37.6   PLATELETS 297 291 301  --  333   NEUTROS ABS 18.07* 27.57* 38.24*  --  43.18*   IMMATURE GRANS (ABS) 0.58* 0.84* 0.91*  --  1.74*   LYMPHS ABS 1.11 1.00 1.02  --  0.50*   MONOS ABS 1.07* 0.91* 2.00*  --  2.10*   EOS ABS 0.27 0.09 0.01  --  0.01   MCV 85.0 85.4 87.3  --  87.0   PROCALCITONIN  --   --   --   --  6.79*   LACTATE  --   --   --  4.3* 4.2*         Lab 01/04/23  0722 01/03/23  0602 01/02/23  0810 01/01/23  1322   SODIUM 135* 135* 134* 133*   POTASSIUM 3.4* 3.8 3.6 3.6   CHLORIDE 101 103 100 94*   CO2 23.0 22.0 20.0* 21.0*   ANION GAP 11.0 10.0 14.0 18.0*   BUN 12 11 10 13   CREATININE 0.55* 0.56* 0.72* 1.09   EGFR 106.6 106.0 98.3 73.0   GLUCOSE 188* 171* 175* 285*   CALCIUM 8.2* 8.3* 8.6 9.2   MAGNESIUM  --   --  1.5*  --          Lab 01/01/23  1322   TOTAL PROTEIN 7.4   ALBUMIN 3.7   GLOBULIN 3.7   ALT (SGPT) 39   AST (SGOT) 24   BILIRUBIN 1.6*   ALK PHOS 128*                     Brief Urine Lab Results  (Last result in the past 365 days)      Color   Clarity   Blood   Leuk Est   Nitrite   Protein   CREAT   Urine HCG        01/01/23 1735 Yellow   Cloudy   Large (3+)   Large (3+)   Negative   30 mg/dL (1+)                 Microbiology Results Abnormal     Procedure Component Value - Date/Time    Blood Culture - Blood, Hand, Right [[id number]]  (Normal) Collected: 01/01/23 1322    Lab Status: Preliminary result Specimen: Blood from Hand, Right Updated: 01/03/23 1346     Blood Culture No growth at 2 days    Blood Culture - Blood, Arm, Right [[id number]]  (Normal) Collected: 01/01/23 1322    Lab Status: Preliminary result Specimen: Blood from Arm, Right Updated: 01/03/23 1346     Blood Culture No growth at 2 days    MRSA Screen, PCR (Inpatient) - Swab, Nares  [[id number]]  (Normal) Collected: 01/02/23 0102    Lab Status: Final result Specimen: Swab from Nares Updated: 01/02/23 0910     MRSA PCR Negative    Narrative:      The negative predictive value of this diagnostic test is high and should only be used to consider de-escalating anti-MRSA therapy. A positive result may indicate colonization with MRSA and must be correlated clinically.  MRSA Negative    COVID PRE-OP / PRE-PROCEDURE SCREENING ORDER (NO ISOLATION) - Swab, Nasopharynx [[id number]]  (Normal) Collected: 01/01/23 1325    Lab Status: Final result Specimen: Swab from Nasopharynx Updated: 01/01/23 1357    Narrative:      The following orders were created for panel order COVID PRE-OP / PRE-PROCEDURE SCREENING ORDER (NO ISOLATION) - Swab, Nasopharynx.  Procedure                               Abnormality         Status                     ---------                               -----------         ------                     COVID-19 and FLU A/B PCR...[[id number]]  Normal              Final result                 Please view results for these tests on the individual orders.    COVID-19 and FLU A/B PCR - Swab, Nasopharynx [[id number]]  (Normal) Collected: 01/01/23 1325    Lab Status: Final result Specimen: Swab from Nasopharynx Updated: 01/01/23 1357     COVID19 Not Detected     Influenza A PCR Not Detected     Influenza B PCR Not Detected    Narrative:      Fact sheet for providers: https://www.fda.gov/media/613349/download    Fact sheet for patients: https://www.fda.gov/media/059778/download    Test performed by PCR.          No radiology results from the last 24 hrs        I have reviewed the medications:  Scheduled Meds:acetaminophen, 1,000 mg, Oral, Q8H  atorvastatin, 20 mg, Oral, Nightly  docusate sodium, 100 mg, Oral, BID  heparin (porcine), 5,000 Units, Subcutaneous, Q8H  insulin lispro, 0-7 Units, Subcutaneous, TID AC  levothyroxine, 50 mcg, Oral, Q AM  piperacillin-tazobactam, 3.375 g, Intravenous,  Q8H  senna-docusate sodium, 2 tablet, Oral, BID  sodium chloride, 10 mL, Intravenous, Q12H  tamsulosin, 0.4 mg, Oral, Nightly      Continuous Infusions:   PRN Meds:.•  senna-docusate sodium **AND** polyethylene glycol **AND** bisacodyl **AND** bisacodyl  •  cyclobenzaprine  •  dextrose  •  dextrose  •  glucagon (human recombinant)  •  HYDROmorphone  •  hyoscyamine sulfate  •  magnesium sulfate **OR** magnesium sulfate **OR** magnesium sulfate  •  melatonin  •  ondansetron  •  oxybutynin  •  oxyCODONE  •  phenazopyridine  •  potassium chloride **OR** potassium chloride **OR** potassium chloride  •  [COMPLETED] Insert Peripheral IV **AND** sodium chloride  •  sodium chloride  •  sodium chloride    Assessment & Plan   Assessment & Plan     Active Hospital Problems    Diagnosis  POA   • **Sepsis, due to unspecified organism, unspecified whether acute organ dysfunction present ([facility]) [A41.9]  Yes   • Essential hypertension [I10]  Yes   • Type 2 diabetes mellitus with hyperglycemia, without long-term current use of insulin (HCC) [E11.65]  Yes   • Obesity (BMI 30-39.9) [E66.9]  Yes   • Hernia, abdominal [K46.9]  Yes   • ÁNGEL (acute kidney injury) (HCC) [N17.9]  Yes      Resolved Hospital Problems   No resolved problems to display.        Brief Hospital Course to date:  Cj Cifuentes is a 72 y.o. male with history of BPH, acute urinary retention, DM2, hypothyroidism, HTN, HLD GERD and sleep apnea who had a recent prostatectomy and presented to the ED due to fever and was found to have sepsis due to UTI.    This patient's problems and plans were partially entered by my partner and updated as appropriate by me 01/04/23.  All problems are new to me today    Sepsis due to UTI  -Urine culture with pseudomonas.  Continue Zosyn while inpatient, WBC much improved, trending down  -Plan to DC on oral Levaquin   -Urology following, DC home when ok with their service     DM2  -A1c 7.0%  -SSI    ÁNGEL, mild  -Resolved with IV  fluids    Hypothyroidism  -Continue synthroid    Hypokalemia, mild  -Replace per protocol     Expected Discharge Location and Transportation: Home  Expected Discharge 23  Expected Discharge Date and Time     Expected Discharge Date Expected Discharge Time    2023            DVT prophylaxis:  Medical DVT prophylaxis orders are present.     AM-PAC 6 Clicks Score (PT): 19 (23)    CODE STATUS:   Code Status and Medical Interventions:   Ordered at: 23 1801     Code Status (Patient has no pulse and is not breathing):    CPR (Attempt to Resuscitate)     Medical Interventions (Patient has pulse or is breathing):    Full Support       Carina Cuba DO  23              Electronically signed by Carina Cuba DO at 23 1002     Maria Eugenia Shine APRN at 23 0929     Summary:Urology Progress                [facility]   Urology Progress Note    Patient Name: Cj Cifuentes  : 1950  MRN: [id number]  Primary Care Physician:  Gary Kaur MD  Date of admission: 2023    Subjective   Subjective     Chief Complaint: Scrotal Pain    HPI:  Patient resting in bed. Scrotal pain controlled. He reports low back pain. Had a good night. Afebrile overnight.     Review of Systems   All systems were reviewed and negative except for: scrotal pain    Objective   Objective     Vitals:   Temp:  [97.9 °F (36.6 °C)-100 °F (37.8 °C)] 97.9 °F (36.6 °C)  Heart Rate:  [] 79  Resp:  [16-18] 18  BP: (126-149)/(75-84) 126/75  Physical Exam    Constitutional: Awake in bed, alert   Eyes: PERRLA, sclerae anicteric, no conjunctival injection   HENT: Normocephalic, atraumatic, mucous membranes moist   Neck: Supple, trachea midline   Respiratory: Equal chest rise, non-labored respirations    Cardiovascular: RRR.   Gastrointestinal: Soft. Incision sites clean,dry ,intact.    Genitourinary: moderate scrotal swelling, left hemiscrotum tenderness with induration. No open wounds or  lesion. Buried penis.   Musculoskeletal: No lower extremity edema bilaterally, no clubbing or cyanosis to extremities   Psychiatric: Appropriate affect, cooperative   Neurologic: Oriented x 3,  Cranial Nerves grossly intact, speech clear   Skin: No rashes     Result Review    Result Review:  I have personally reviewed the results from the time of this admission to 1/4/2023 09:29 EST and agree with these findings:  [x]  Laboratory  []  Microbiology  []  Radiology  []  EKG/Telemetry   []  Cardiology/Vascular   []  Pathology  []  Old records  []  Other:      Assessment & Plan   Assessment / Plan     Brief Patient Summary:  Cj Cifuentes is a 72 y.o. male who is s/p Robotic-assisted laparoscopic simple prostatectomy with Dr. Stinson who was readmitted for epididymoorchitis.     Active Hospital Problems:  Active Hospital Problems    Diagnosis    • **Sepsis, due to unspecified organism, unspecified whether acute organ dysfunction present (HCC)    • Essential hypertension    • Type 2 diabetes mellitus with hyperglycemia, without long-term current use of insulin (HCC)    • Obesity (BMI 30-39.9)    • Hernia, abdominal    • ÁNGEL (acute kidney injury) (HCC)      He was able to void upon my assessment today. Bladder scan PVR at bedside, 214cc.   He is now up to chair. Placed sheet under scrotum for scrotal support.       WBC 21.15 from 30.46  Afebrile  Urine cx >100,000 pseudomonas aeruginosa.  Appreciate Hospitalist.   D/w Dr. Stinson.       DVT prophylaxis:  Medical DVT prophylaxis orders are present.    CODE STATUS:   Code Status (Patient has no pulse and is not breathing): CPR (Attempt to Resuscitate)  Medical Interventions (Patient has pulse or is breathing): Full Support    Disposition:  I expect patient to remain inpatient.     Electronically signed by ROCIO Fortune, 01/04/23, 9:29 AM EST.             Electronically signed by Maria Eugenia Shine APRN at 01/04/23 6302     Maria Eugenia Shine APRN at 01/03/23 1457      Summary:Urology Progress                [facility]   Urology Progress Note    Patient Name: Cj Cifuentes  : 1950  MRN: [id number]  Primary Care Physician:  Gary Kaur MD  Date of admission: 2023    Subjective   Subjective     Chief Complaint: Fever/left sided scrotal swelling    HPI:  Patient resting in bed. Ambulated with PT earlier today. Had large incontinent void at that time, bladder scan PVR was 0. Pain controlled.    He currently does not feel the urge to void, bladder scan 362cc. Febrile; 100.0.    Review of Systems   All systems were reviewed and negative except for: none    Objective   Objective     Vitals:   Temp:  [97.7 °F (36.5 °C)-100 °F (37.8 °C)] 100 °F (37.8 °C)  Heart Rate:  [] 97  Resp:  [16-18] 18  BP: (127-149)/(75-96) 149/75  Flow (L/min):  [2] 2  Physical Exam    Constitutional: Awake in bed, alert   Eyes: PERRLA, sclerae anicteric, no conjunctival injection   HENT: Normocephalic, atraumatic, mucous membranes moist   Neck: Supple, trachea midline   Respiratory: Equal chest rise, non-labored respirations    Cardiovascular: RRR, palpable radial pulses bilaterally   Gastrointestinal: Soft, nontender, non-distended   Genitourinary: scrotal swelling and erythema. Buried penis. No lesions/crepitus.   Musculoskeletal: No lower extremity edema bilaterally, no clubbing or cyanosis to extremities   Psychiatric: Appropriate affect, cooperative   Neurologic: Oriented x 3,  Cranial Nerves grossly intact, speech clear   Skin: No rashes     Result Review    Result Review:  I have personally reviewed the results from the time of this admission to 1/3/2023 16:34 EST and agree with these findings:  [x]  Laboratory  [x]  Microbiology  []  Radiology  []  EKG/Telemetry   []  Cardiology/Vascular   []  Pathology  []  Old records  []  Other:    Assessment & Plan   Assessment / Plan     Brief Patient Summary:  Cj Cifuentes is a 72 y.o. male who is s/p Robotic assisted  laparoscopic simple prostatectomy with Dr. Stinson who was readmitted for epididymoorchitis. Mayes catheter removed this morning.     Active Hospital Problems:  Active Hospital Problems    Diagnosis    • **Sepsis, due to unspecified organism, unspecified whether acute organ dysfunction present ([facility])    • Essential hypertension    • Type 2 diabetes mellitus with hyperglycemia, without long-term current use of insulin ([facility])    • Obesity (BMI 30-39.9)    • Hernia, abdominal    • ÁNGEL (acute kidney injury) ([facility])        Plan:   -Bladder scan 362 cc currently; d/w Dr. Metz. Ok to continue to monitor with bladder scan Q8 hour PVR. Notify Urology if >400cc.   -Keep scrotum elevated  -Continue abx   will follow, please call if any questions       DVT prophylaxis:  Medical DVT prophylaxis orders are present.    CODE STATUS:   Code Status (Patient has no pulse and is not breathing): CPR (Attempt to Resuscitate)  Medical Interventions (Patient has pulse or is breathing): Full Support    Disposition:  I expect patient to remain inpatient.     Electronically signed by ROCIO Fortune, 23, 4:34 PM EST.             Electronically signed by Maria Eugenia Shine APRN at 23 1642     Tuyet Stinson MD at 23 0923           [facility]   Urology Progress Note    Patient Name: Cj Cifuentes  : 1950  MRN: [id number]  Date of admission: 2023  Surgical Procedures Since Admission:    Subjective   Subjective     Chief Complaint: Epididymoorchitis    History of Present Illness   Reports he did well overnight.  States that he is still sore but improving.  Catheter with clear urine.  Prelim urine culture with Pseudomonas.        Objective   Objective     Vitals:   Temp:  [97.7 °F (36.5 °C)-100.3 °F (37.9 °C)] 98 °F (36.7 °C)  Heart Rate:  [] 79  Resp:  [16-20] 18  BP: (127-154)/(74-96) 137/78  Flow (L/min):  [2] 2  Output by Drain (mL) 23 0701 - 23 1900 23 1901 - 23 0700 23  0701 - 01/03/23 0924 Range Total   Urethral Catheter Coude 18 Fr. [phone]       Physical Exam  Vitals and nursing note reviewed.   Constitutional:       General: He is awake. He is not in acute distress.     Appearance: Normal appearance. He is well-developed. He is obese.   HENT:      Head: Normocephalic and atraumatic.      Right Ear: External ear normal.      Left Ear: External ear normal.      Nose: Nose normal.   Eyes:      Conjunctiva/sclera: Conjunctivae normal.   Pulmonary:      Effort: Pulmonary effort is normal.   Abdominal:      General: There is no distension.      Palpations: Abdomen is soft. There is no mass.      Tenderness: There is no abdominal tenderness. There is no right CVA tenderness, left CVA tenderness, guarding or rebound.      Hernia: No hernia is present. There is no hernia in the left inguinal area or right inguinal area.   Genitourinary:     Pubic Area: No rash.       Rectum: No mass or tenderness. Normal anal tone.      Comments: Penile edema.    Scrotal edema with left testicle indurated.  No purulence.  No skin breakdown.  NO crepitus  Musculoskeletal:      Cervical back: Normal range of motion.   Lymphadenopathy:      Lower Body: No right inguinal adenopathy. No left inguinal adenopathy.   Skin:     General: Skin is warm.   Neurological:      General: No focal deficit present.      Mental Status: He is alert and oriented to person, place, and time.   Psychiatric:         Behavior: Behavior normal. Behavior is cooperative.                Result Review    Result Review:  I have personally reviewed the results from the time of this admission to 1/3/2023 09:24 EST and agree with these findings:  [x]  Laboratory  [x]  Microbiology  []  Radiology  []  EKG/Telemetry   []  Cardiology/Vascular   []  Pathology  []  Old records  []  Other:  Most notable findings include:     Assessment & Plan   Assessment / Plan     Brief Patient Summary:  Cj Cifuentes is a 72 y.o. male who is s/p  robot assisted lap simple prostatectomy.  Readmitted with epididymoorchitis. His WBC is trending down now at 30k.  Subjectively feels better.      Active Hospital Problems:  Active Hospital Problems    Diagnosis    • **Sepsis, due to unspecified organism, unspecified whether acute organ dysfunction present ([facility])    • Essential hypertension    • Type 2 diabetes mellitus with hyperglycemia, without long-term current use of insulin ([facility])    • Obesity (BMI 30-39.9)    • Hernia, abdominal    • ÁNGEL (acute kidney injury) ([facility])      Plan:   1.  Mayes catheter removed today for TOV.  Will need bladder scan after his void  2.  Keep scrotum elevated  3.  Alterate ice packs and warm compresses  4.  Continue broad spectrum antibiotic coverage for now  5.  Will follow up his TOV today.        Electronically signed by Tuyet Stinson MD at 23 0989     Cherise Hopkins MD at 23 0849              [facility] Medicine Services  PROGRESS NOTE    Patient Name: Cj Cifuentes  : 1950  MRN: [id number]    Date of Admission: 2023  Primary Care Physician: Gary Kaur MD    Subjective   Subjective     CC:  F/U sepsis due to UTI    HPI:  Catheter is out.  He is having some urine leaking since removal.    ROS:  Gen: No fever  : As above      Objective   Objective     Vital Signs:   Temp:  [97.7 °F (36.5 °C)-100.3 °F (37.9 °C)] 98 °F (36.7 °C)  Heart Rate:  [] 79  Resp:  [16-20] 18  BP: (127-154)/(74-96) 137/78  Flow (L/min):  [2] 2     Physical Exam:  Constitutional: No acute distress, awake, alert, sitting up in chair  HENT: NCAT, mucous membranes moist  Respiratory: Respiratory effort normal, CTAB   Cardiovascular: RRR, no murmurs  Gastrointestinal: Nondistended  Musculoskeletal: No bilateral ankle edema  Psychiatric: Appropriate affect, cooperative  Neurologic: Speech clear  Skin: No rashes on exposed surfaces    Results Reviewed:  LAB RESULTS:      Lab 23  0602  01/02/23  0810 01/01/23  1828 01/01/23  1322   WBC 30.46* 42.32*  --  47.57*   HEMOGLOBIN 9.8* 10.7*  --  12.9*   HEMATOCRIT 28.7* 32.2*  --  37.6   PLATELETS 291 301  --  333   NEUTROS ABS 27.57* 38.24*  --  43.18*   IMMATURE GRANS (ABS) 0.84* 0.91*  --  1.74*   LYMPHS ABS 1.00 1.02  --  0.50*   MONOS ABS 0.91* 2.00*  --  2.10*   EOS ABS 0.09 0.01  --  0.01   MCV 85.4 87.3  --  87.0   PROCALCITONIN  --   --   --  6.79*   LACTATE  --   --  4.3* 4.2*         Lab 01/03/23  0602 01/02/23  0810 01/01/23  1322   SODIUM 135* 134* 133*   POTASSIUM 3.8 3.6 3.6   CHLORIDE 103 100 94*   CO2 22.0 20.0* 21.0*   ANION GAP 10.0 14.0 18.0*   BUN 11 10 13   CREATININE 0.56* 0.72* 1.09   EGFR 106.0 98.3 73.0   GLUCOSE 171* 175* 285*   CALCIUM 8.3* 8.6 9.2   MAGNESIUM  --  1.5*  --          Lab 01/01/23 1322   TOTAL PROTEIN 7.4   ALBUMIN 3.7   GLOBULIN 3.7   ALT (SGPT) 39   AST (SGOT) 24   BILIRUBIN 1.6*   ALK PHOS 128*                     Brief Urine Lab Results  (Last result in the past 365 days)      Color   Clarity   Blood   Leuk Est   Nitrite   Protein   CREAT   Urine HCG        01/01/23 1735 Yellow   Cloudy   Large (3+)   Large (3+)   Negative   30 mg/dL (1+)                 Microbiology Results Abnormal     Procedure Component Value - Date/Time    Blood Culture - Blood, Hand, Right [[id number]]  (Normal) Collected: 01/01/23 1322    Lab Status: Preliminary result Specimen: Blood from Hand, Right Updated: 01/02/23 1345     Blood Culture No growth at 24 hours    Blood Culture - Blood, Arm, Right [[id number]]  (Normal) Collected: 01/01/23 1322    Lab Status: Preliminary result Specimen: Blood from Arm, Right Updated: 01/02/23 1345     Blood Culture No growth at 24 hours    MRSA Screen, PCR (Inpatient) - Swab, Nares [[id number]]  (Normal) Collected: 01/02/23 0102    Lab Status: Final result Specimen: Swab from Nares Updated: 01/02/23 0910     MRSA PCR Negative    Narrative:      The negative predictive value of this diagnostic test  is high and should only be used to consider de-escalating anti-MRSA therapy. A positive result may indicate colonization with MRSA and must be correlated clinically.  MRSA Negative    COVID PRE-OP / PRE-PROCEDURE SCREENING ORDER (NO ISOLATION) - Swab, Nasopharynx [[id number]]  (Normal) Collected: 01/01/23 1325    Lab Status: Final result Specimen: Swab from Nasopharynx Updated: 01/01/23 0236    Narrative:      The following orders were created for panel order COVID PRE-OP / PRE-PROCEDURE SCREENING ORDER (NO ISOLATION) - Swab, Nasopharynx.  Procedure                               Abnormality         Status                     ---------                               -----------         ------                     COVID-19 and FLU A/B PCR...[[id number]]  Normal              Final result                 Please view results for these tests on the individual orders.    COVID-19 and FLU A/B PCR - Swab, Nasopharynx [[id number]]  (Normal) Collected: 01/01/23 1325    Lab Status: Final result Specimen: Swab from Nasopharynx Updated: 01/01/23 2100     COVID19 Not Detected     Influenza A PCR Not Detected     Influenza B PCR Not Detected    Narrative:      Fact sheet for providers: https://www.fda.gov/media/930487/download    Fact sheet for patients: https://www.fda.gov/media/796331/download    Test performed by PCR.          US Scrotum & Testicles    Result Date: 1/1/2023  DATE OF EXAM: 1/1/2023 2:00 PM  PROCEDURE: US SCROTUM AND TESTICLES-  INDICATIONS: Bilateral testicular swelling, left testicular pain, redness, recent urological prostate surgery with indwelling catheter  COMPARISON: No comparisons available.  TECHNIQUE: Multiple sonographic images of the scrotum were obtained in transverse and longitudinal planes. Grayscale and color Doppler duplex techniques were utilized.  FINDINGS: The right testicle measures 4.1 x 3.1 x 4.2 cm. There are small surrounding hydrocele. Unremarkable 13 mm epididymis.  The left testicle measures  5.0 x 3.7 x 3.9 cm. There is surrounding complex, partially echogenic and septated hydrocele present, concerning for superimposed infection, pyocele. Hyperemic 18 mm epididymis.      Impression: Mildly enlarged and hyperemic left epididymis concerning for epididymitis, with concurrent complex, septated and debris filled hydrocele on the left, concerning for component of infectious pyocele.  This report was finalized on 1/1/2023 3:07 PM by Naldo Silveira.      CT Abdomen Pelvis With Contrast    Result Date: 1/1/2023  DATE OF EXAM: 1/1/2023 3:04 PM  PROCEDURE: CT ABDOMEN PELVIS W CONTRAST-  INDICATIONS: Fever, recent prostate surgery, groin and testicular pain and swelling.  Please scan through the groin/testicle region.  COMPARISON: 10/16/2022  TECHNIQUE: Routine transaxial slices were obtained through the abdomen and pelvis after the intravenous administration of 75 mL of Isovue 300. Reconstructed coronal and sagittal images were also obtained. Automated exposure control and iterative construction methods were used.  The radiation dose reduction device was turned on for each scan per the ALARA (As Low as Reasonably Achievable) protocol.  FINDINGS: The lung bases are grossly clear. Evaluation of the body wall soft tissues demonstrates a fat-containing ventral body wall hernia. The scrotal soft tissues are better evaluated on same-day ultrasound, however there are similar findings including a complex left-sided hydrocele, concerning for pyocele. There also appears to be a left-sided varicocele. The liver, spleen, bilateral adrenal glands demonstrate homogeneous enhancement without evidence of suspicious focal lesion. There is a fluid attenuating finding along the distal pancreatic body measuring 2.2 x 1.3 cm. Unremarkable gallbladder. Nonobstructing renal calculi are noted on the right and there is a large exophytic left renal cyst. The kidneys are otherwise normal. Small and large bowel loops are nondilated. The  appendix is normal. There is no free fluid or pneumoperitoneum. Edematous changes are noted in the pelvis, somewhat nonspecific in the reported recent postoperative setting, with evidence of prior prostatectomy. Grossly unremarkable urinary bladder.      Impression: Evaluation of the scrotum demonstrate similar findings to same-day ultrasound, with bilateral hydroceles present, appearing somewhat hyperdense on the left, better characterized on ultrasound and concerning for pyocele. The left epididymis also appears enlarged and there is a left-sided varicocele.  Some nonspecific edema is seen within the pelvis, likely relating to reported recent history of prostatectomy.  Incidentally noted 2.2 cm cystic finding of the pancreatic body. Consider nonemergent pancreatic protocol MRI when able.  This report was finalized on 1/1/2023 4:09 PM by Naldo Silveira.            I have reviewed the medications:  Scheduled Meds:acetaminophen, 1,000 mg, Oral, Q8H  atorvastatin, 20 mg, Oral, Nightly  docusate sodium, 100 mg, Oral, BID  heparin (porcine), 5,000 Units, Subcutaneous, Q8H  insulin lispro, 0-7 Units, Subcutaneous, TID AC  levothyroxine, 50 mcg, Oral, Q AM  piperacillin-tazobactam, 3.375 g, Intravenous, Q8H  senna-docusate sodium, 2 tablet, Oral, BID  sodium chloride, 10 mL, Intravenous, Q12H  tamsulosin, 0.4 mg, Oral, Nightly  vancomycin (dosing per levels), , Does not apply, Daily  vancomycin, 1,500 mg, Intravenous, Q12H      Continuous Infusions:Pharmacy to dose vancomycin,       PRN Meds:.•  senna-docusate sodium **AND** polyethylene glycol **AND** bisacodyl **AND** bisacodyl  •  cyclobenzaprine  •  dextrose  •  dextrose  •  glucagon (human recombinant)  •  HYDROmorphone  •  hyoscyamine sulfate  •  magnesium sulfate **OR** magnesium sulfate **OR** magnesium sulfate  •  melatonin  •  ondansetron  •  oxybutynin  •  oxyCODONE  •  Pharmacy to dose vancomycin  •  phenazopyridine  •  potassium chloride **OR** potassium  chloride **OR** potassium chloride  •  [COMPLETED] Insert Peripheral IV **AND** sodium chloride  •  sodium chloride  •  sodium chloride    Assessment & Plan   Assessment & Plan     Active Hospital Problems    Diagnosis  POA   • **Sepsis, due to unspecified organism, unspecified whether acute organ dysfunction present ([facility]) [A41.9]  Yes   • Essential hypertension [I10]  Yes   • Type 2 diabetes mellitus with hyperglycemia, without long-term current use of insulin ([facility]) [E11.65]  Yes   • Obesity (BMI 30-39.9) [E66.9]  Yes   • Hernia, abdominal [K46.9]  Yes   • ÁNGEL (acute kidney injury) ([facility]) [N17.9]  Yes      Resolved Hospital Problems   No resolved problems to display.        Brief Hospital Course to date:  Cj Cifuentes is a 72 y.o. male with history of BPH, acute urinary retention, DM2, hypothyroidism, HTN, HLD GERD and sleep apnea who had a recent prostatectomy and presented to the ED due to fever and was found to have sepsis due to UTI.    This patient's problems and plans were partially entered by my partner and updated as appropriate by me 01/03/23.    Sepsis due to UTI  -Urine culture with pseudomonas.  Continue Zosyn.  D/C vancomycin.  May need ID consult depending on final culture results if not sensitive to fluoroquinolone.  -IV fluid resuscitation  -Urology following    DM2  -A1c 7.0%  -SSI    ÁNGEL, mild  -Resolved with IV fluids  -Baseline creatinine 0.75, was 1.09 on admission    Hypothyroidism  -Continue synthroid    Expected Discharge Location and Transportation: Home  Expected Discharge   Expected Discharge Date and Time     Expected Discharge Date Expected Discharge Time    Jan 4, 2023            DVT prophylaxis:  Medical DVT prophylaxis orders are present.     AM-PAC 6 Clicks Score (PT): 19 (01/02/23 2104)    CODE STATUS:   Code Status and Medical Interventions:   Ordered at: 01/01/23 1801     Code Status (Patient has no pulse and is not breathing):    CPR (Attempt to Resuscitate)     Medical  Interventions (Patient has pulse or is breathing):    Full Support       Cherise Hopkins MD  23              Electronically signed by Cherise Hopkins MD at 23 [phone]     James Bingham MD at 23 1019           [facility]   Urology Progress Note    Patient Name: Cj Cifuentes  : 1950  MRN: [id number]  Primary Care Physician:  Gary Kaur MD  Date of admission: 2023    Subjective   Subjective     Chief Complaint: Urosepsis, L epididymoorchitis     HPI:  Patient Reports improvement in pain but still requiring narcotics. Catheter exchanged yesterday, draining clear yellow urine. Eating well. Has ambulated minimally.     WBC minimally improved 42 from 47.     Afebrile. He did fever to 103 F yesterday 7 PM.    UOP 2050 mL/24 hours.   Cr preserved 0.7.   Cultures pending    Review of Systems   All systems were reviewed and negative except for: None    Objective   Objective     Vitals:   Temp:  [98.4 °F (36.9 °C)-103.1 °F (39.5 °C)] 98.8 °F (37.1 °C)  Heart Rate:  [] 94  Resp:  [16-20] 16  BP: (107-141)/(63-91) 141/73  Flow (L/min):  [2] 2  Physical Exam    Constitutional: Awake in bed, alert   Eyes: PERRLA, sclerae anicteric, no conjunctival injection   HENT: Normocephalic, atraumatic, mucous membranes moist   Neck: Supple, trachea midline   Respiratory: Equal chest rise, non-labored respirations    Cardiovascular: RRR, palpable radial pulses bilaterally   Gastrointestinal: Soft, nontender, non-distended   Genitourinary: Buried circumcised phallus, orthotopic meatus, right testicle palpably normal, left hemiscrotum remains enlarged and indurated, scrotal skin with pitting edema, no skin breakdown or purulence, no crepitus or eschar noted    Musculoskeletal: No lower extremity edema bilaterally, no clubbing or cyanosis to extremities   Psychiatric: Appropriate affect, cooperative   Neurologic: Oriented x 3,  Cranial Nerves grossly intact, speech clear   Skin: No rashes      Result Review    Result Review:  I have personally reviewed the results from the time of this admission to 1/2/2023 10:19 EST and agree with these findings:  [x]  Laboratory  [x]  Microbiology  []  Radiology  []  EKG/Telemetry   []  Cardiology/Vascular   []  Pathology  []  Old records  []  Other:    Most notable findings include: WBC 42 from 47, afebrile, cultures pending    Assessment & Plan   Assessment / Plan     Brief Patient Summary:  Cj Cifuentes is a 72 y.o. male who underwent robotic simple prostatectomy for refractory BPH and urinary retention on 12/23/22, procedure was uncomplicated.  Post op catheter has remained in place, he developed a severe left epididymoorchitis with significant leukocytosis and lactic acidosis. Cultures are pending. WBC improving slowly on Vanc/Zosyn.     Catheter exchanged in ER. Discussed with patient if continued improvement, may remove his catheter while admitted. Defer to Dr Stinson, treating surgeon, tomorrow.       Active Hospital Problems:  Active Hospital Problems    Diagnosis    • **Sepsis, due to unspecified organism, unspecified whether acute organ dysfunction present ([facility])    • Essential hypertension    • Type 2 diabetes mellitus with hyperglycemia, without long-term current use of insulin ([facility])    • Obesity (BMI 30-39.9)    • Hernia, abdominal    • ÁNGEL (acute kidney injury) ([facility])        Plan:   - cont broad spectrum abx, f/u cultures  - catheter plan per Dr Stinson 1/3/23   - no obvious concern for Dawson's gangrene at this time, appears to be an isolated severe left epididymoorchitis         DVT prophylaxis:  Medical DVT prophylaxis orders are present.    CODE STATUS:   Code Status (Patient has no pulse and is not breathing): CPR (Attempt to Resuscitate)  Medical Interventions (Patient has pulse or is breathing): Full Support    Disposition:  I expect patient to remain admitted.    Electronically signed by James Bingham MD, 01/02/23, 10:19 AM  EST.               Electronically signed by James Bingham MD at 23 1041     Cherise Hopkins MD at 23 1000              [facility] Medicine Services  PROGRESS NOTE    Patient Name: Cj Cifuentes  : 1950  MRN: [id number]    Date of Admission: 2023  Primary Care Physician: Gary Kaur MD    Subjective   Subjective     CC:  F/U sepsis due to UTI    HPI:  He is feeling better today so far.    ROS:  Gen: + fever  GI- No N/V/D, abd pain      Objective   Objective     Vital Signs:   Temp:  [98.4 °F (36.9 °C)-103.1 °F (39.5 °C)] 98.8 °F (37.1 °C)  Heart Rate:  [] 94  Resp:  [16-20] 16  BP: (107-141)/(63-91) 141/73  Flow (L/min):  [2] 2     Physical Exam:  Constitutional: No acute distress, awake, alert, laying in bed  HENT: NCAT, mucous membranes moist  Respiratory: Respiratory effort normal   Cardiovascular: RRR  Gastrointestinal: Soft, nontender, nondistended  Musculoskeletal: No bilateral ankle edema  Psychiatric: Appropriate affect, cooperative  Neurologic: Speech clear  Skin: No rashes on exposed surfaces    Results Reviewed:  LAB RESULTS:      Lab 23  0810 23  1828 23  1322   WBC 42.32*  --  47.57*   HEMOGLOBIN 10.7*  --  12.9*   HEMATOCRIT 32.2*  --  37.6   PLATELETS 301  --  333   NEUTROS ABS 38.24*  --  43.18*   IMMATURE GRANS (ABS) 0.91*  --  1.74*   LYMPHS ABS 1.02  --  0.50*   MONOS ABS 2.00*  --  2.10*   EOS ABS 0.01  --  0.01   MCV 87.3  --  87.0   PROCALCITONIN  --   --  6.79*   LACTATE  --  4.3* 4.2*         Lab 23  0810 23  1322   SODIUM 134* 133*   POTASSIUM 3.6 3.6   CHLORIDE 100 94*   CO2 20.0* 21.0*   ANION GAP 14.0 18.0*   BUN 10 13   CREATININE 0.72* 1.09   EGFR 98.3 73.0   GLUCOSE 175* 285*   CALCIUM 8.6 9.2   MAGNESIUM 1.5*  --          Lab 23  1322   TOTAL PROTEIN 7.4   ALBUMIN 3.7   GLOBULIN 3.7   ALT (SGPT) 39   AST (SGOT) 24   BILIRUBIN 1.6*   ALK PHOS 128*                     Brief Urine  Lab Results  (Last result in the past 365 days)      Color   Clarity   Blood   Leuk Est   Nitrite   Protein   CREAT   Urine HCG        01/01/23 1735 Yellow   Cloudy   Large (3+)   Large (3+)   Negative   30 mg/dL (1+)                 Microbiology Results Abnormal     Procedure Component Value - Date/Time    MRSA Screen, PCR (Inpatient) - Swab, Nares [[id number]]  (Normal) Collected: 01/02/23 0102    Lab Status: Final result Specimen: Swab from Nares Updated: 01/02/23 0910     MRSA PCR Negative    Narrative:      The negative predictive value of this diagnostic test is high and should only be used to consider de-escalating anti-MRSA therapy. A positive result may indicate colonization with MRSA and must be correlated clinically.  MRSA Negative    COVID PRE-OP / PRE-PROCEDURE SCREENING ORDER (NO ISOLATION) - Swab, Nasopharynx [[id number]]  (Normal) Collected: 01/01/23 1325    Lab Status: Final result Specimen: Swab from Nasopharynx Updated: 01/01/23 1357    Narrative:      The following orders were created for panel order COVID PRE-OP / PRE-PROCEDURE SCREENING ORDER (NO ISOLATION) - Swab, Nasopharynx.  Procedure                               Abnormality         Status                     ---------                               -----------         ------                     COVID-19 and FLU A/B PCR...[[id number]]  Normal              Final result                 Please view results for these tests on the individual orders.    COVID-19 and FLU A/B PCR - Swab, Nasopharynx [[id number]]  (Normal) Collected: 01/01/23 1325    Lab Status: Final result Specimen: Swab from Nasopharynx Updated: 01/01/23 1357     COVID19 Not Detected     Influenza A PCR Not Detected     Influenza B PCR Not Detected    Narrative:      Fact sheet for providers: https://www.fda.gov/media/997031/download    Fact sheet for patients: https://www.fda.gov/media/478950/download    Test performed by PCR.           Scrotum & Testicles    Result Date:  1/1/2023  DATE OF EXAM: 1/1/2023 2:00 PM  PROCEDURE: US SCROTUM AND TESTICLES-  INDICATIONS: Bilateral testicular swelling, left testicular pain, redness, recent urological prostate surgery with indwelling catheter  COMPARISON: No comparisons available.  TECHNIQUE: Multiple sonographic images of the scrotum were obtained in transverse and longitudinal planes. Grayscale and color Doppler duplex techniques were utilized.  FINDINGS: The right testicle measures 4.1 x 3.1 x 4.2 cm. There are small surrounding hydrocele. Unremarkable 13 mm epididymis.  The left testicle measures 5.0 x 3.7 x 3.9 cm. There is surrounding complex, partially echogenic and septated hydrocele present, concerning for superimposed infection, pyocele. Hyperemic 18 mm epididymis.      Impression: Mildly enlarged and hyperemic left epididymis concerning for epididymitis, with concurrent complex, septated and debris filled hydrocele on the left, concerning for component of infectious pyocele.  This report was finalized on 1/1/2023 3:07 PM by Naldo Silveira.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

## 2023-01-06 NOTE — PROGRESS NOTES
[facility] Medicine Services  PROGRESS NOTE    Patient Name: Cj Cifuentes  : 1950  MRN: [id number]    Date of Admission: 2023  Primary Care Physician: Gary Kaur MD    Subjective   Subjective     CC:  F/U sepsis due to UTI    HPI:  Patient seen and examined. Says he feels about the same. A bit less tender. Swelling present. Dr Negron at bedside. Plan for US today due to WBC not improving.     ROS:  Gen- No fevers, chills  CV- No chest pain, palpitations  Resp- No cough, dyspnea  GI- No N/V/D, abd pain    Objective   Objective     Vital Signs:   Temp:  [97.8 °F (36.6 °C)-99.6 °F (37.6 °C)] 97.8 °F (36.6 °C)  Heart Rate:  [69-90] 74  Resp:  [16-18] 18  BP: (127-146)/(73-86) 127/73  Flow (L/min):  [2] 2     Physical Exam:  Constitutional: No acute distress, awake, alert, sitting up in bed   HENT: NCAT, mucous membranes moist  Respiratory: Respiratory effort normal, Clear to Auscultation B/L   Cardiovascular: RRR, no murmurs  Gastrointestinal: BSx4, non-tender, non-distended   : +scrotal scwelling/Tenderness   Musculoskeletal: No bilateral ankle edema  Psychiatric: Appropriate affect, cooperative  Neurologic: Speech clear  Skin: No rashes on exposed surfaces    Results Reviewed:  LAB RESULTS:      Lab 23  0624 23  0820 23  0722 23  0602 23  0810 23  1828 23  1322   WBC 23.51* 22.28* 21.15* 30.46* 42.32*  --  47.57*   HEMOGLOBIN 11.1* 11.3* 9.9* 9.8* 10.7*  --  12.9*   HEMATOCRIT 32.2* 33.4* 28.8* 28.7* 32.2*  --  37.6   PLATELETS 375 354 297 291 301  --  333   NEUTROS ABS 19.98* 19.31* 18.07* 27.57* 38.24*  --  43.18*   IMMATURE GRANS (ABS) 0.33* 0.17* 0.58* 0.84* 0.91*  --  1.74*   LYMPHS ABS 1.46 1.14 1.11 1.00 1.02  --  0.50*   MONOS ABS 1.34* 1.37* 1.07* 0.91* 2.00*  --  2.10*   EOS ABS 0.31 0.22 0.27 0.09 0.01  --  0.01   MCV 85.6 87.2 85.0 85.4 87.3  --  87.0   PROCALCITONIN  --   --   --   --   --   --  6.79*    LACTATE  --   --   --   --   --  4.3* 4.2*         Lab 01/06/23  0624 01/05/23  0820 01/04/23  0722 01/03/23  0602 01/02/23  0810   SODIUM 134* 134* 135* 135* 134*   POTASSIUM 3.9 4.2 3.4* 3.8 3.6   CHLORIDE 98 99 101 103 100   CO2 23.0 25.0 23.0 22.0 20.0*   ANION GAP 13.0 10.0 11.0 10.0 14.0   BUN 10 9 12 11 10   CREATININE 0.60* 0.62* 0.55* 0.56* 0.72*   EGFR 103.8 102.8 106.6 106.0 98.3   GLUCOSE 137* 222* 188* 171* 175*   CALCIUM 8.6 8.6 8.2* 8.3* 8.6   MAGNESIUM  --  1.6  --   --  1.5*         Lab 01/01/23  1322   TOTAL PROTEIN 7.4   ALBUMIN 3.7   GLOBULIN 3.7   ALT (SGPT) 39   AST (SGOT) 24   BILIRUBIN 1.6*   ALK PHOS 128*                     Brief Urine Lab Results  (Last result in the past 365 days)      Color   Clarity   Blood   Leuk Est   Nitrite   Protein   CREAT   Urine HCG        01/01/23 1735 Yellow   Cloudy   Large (3+)   Large (3+)   Negative   30 mg/dL (1+)                 Microbiology Results Abnormal     Procedure Component Value - Date/Time    Blood Culture - Blood, Hand, Right [[id number]]  (Normal) Collected: 01/01/23 1322    Lab Status: Preliminary result Specimen: Blood from Hand, Right Updated: 01/05/23 1345     Blood Culture No growth at 4 days    Blood Culture - Blood, Arm, Right [[id number]]  (Normal) Collected: 01/01/23 1322    Lab Status: Preliminary result Specimen: Blood from Arm, Right Updated: 01/05/23 1345     Blood Culture No growth at 4 days    MRSA Screen, PCR (Inpatient) - Swab, Nares [[id number]]  (Normal) Collected: 01/02/23 0102    Lab Status: Final result Specimen: Swab from Nares Updated: 01/02/23 0910     MRSA PCR Negative    Narrative:      The negative predictive value of this diagnostic test is high and should only be used to consider de-escalating anti-MRSA therapy. A positive result may indicate colonization with MRSA and must be correlated clinically.  MRSA Negative    COVID PRE-OP / PRE-PROCEDURE SCREENING ORDER (NO ISOLATION) - Swab, Nasopharynx [[id number]]   (Normal) Collected: 01/01/23 1325    Lab Status: Final result Specimen: Swab from Nasopharynx Updated: 01/01/23 2827    Narrative:      The following orders were created for panel order COVID PRE-OP / PRE-PROCEDURE SCREENING ORDER (NO ISOLATION) - Swab, Nasopharynx.  Procedure                               Abnormality         Status                     ---------                               -----------         ------                     COVID-19 and FLU A/B PCR...[[id number]]  Normal              Final result                 Please view results for these tests on the individual orders.    COVID-19 and FLU A/B PCR - Swab, Nasopharynx [[id number]]  (Normal) Collected: 01/01/23 1325    Lab Status: Final result Specimen: Swab from Nasopharynx Updated: 01/01/23 1357     COVID19 Not Detected     Influenza A PCR Not Detected     Influenza B PCR Not Detected    Narrative:      Fact sheet for providers: https://www.fda.gov/media/189894/download    Fact sheet for patients: https://www.fda.gov/media/885519/download    Test performed by PCR.          US Scrotum & Testicles    Result Date: 1/6/2023  US SCROTUM AND TESTICLES Date of Exam: 1/6/2023 8:27 AM EST Indication: Epididymoorchitis, elevated WBC. Comparison: January 1, 2023 Technique: Multiple sonographic images of the scrotum were obtained in transverse and longitudinal planes. Grayscale and color Doppler duplex techniques were utilized. Findings: There is diffuse scrotal wall thickening. Both testicles appear normal in size and echotexture, although the right testicle appears hypervascular. The right testicle measures 4.6 x 3.9 x 3.5 cm, while the left testicle measures 5.5 x 3.5 x 4.2 cm. There are moderate bilateral hydroceles, complex on the left. The bilateral epididymis appear hypervascular. There is hyperemia within the soft tissues lateral to both testicles.     Impression: Impression: Diffuse scrotal wall thickening with hyperemia in soft tissues lateral to both  testicles, and hyperemia of right testicle and hyperemia of both epididymis, suggesting bilateral epididymo-orchitis. Moderate bilateral hydroceles complex on the left. Electronically Signed: Ryan Ybarra  1/6/2023 9:26 AM EST  Workstation ID: [id number]          I have reviewed the medications:  Scheduled Meds:acetaminophen, 1,000 mg, Oral, Q8H  atorvastatin, 20 mg, Oral, Nightly  docusate sodium, 100 mg, Oral, BID  heparin (porcine), 5,000 Units, Subcutaneous, Q8H  insulin detemir, 5 Units, Subcutaneous, Daily  insulin lispro, 0-7 Units, Subcutaneous, TID AC  levothyroxine, 50 mcg, Oral, Q AM  piperacillin-tazobactam, 3.375 g, Intravenous, Q8H  senna-docusate sodium, 2 tablet, Oral, BID  sodium chloride, 10 mL, Intravenous, Q12H  tamsulosin, 0.4 mg, Oral, Nightly      Continuous Infusions:   PRN Meds:.•  senna-docusate sodium **AND** polyethylene glycol **AND** bisacodyl **AND** bisacodyl  •  cyclobenzaprine  •  dextrose  •  dextrose  •  glucagon (human recombinant)  •  HYDROmorphone  •  hyoscyamine sulfate  •  magnesium sulfate **OR** magnesium sulfate **OR** magnesium sulfate  •  melatonin  •  ondansetron  •  oxybutynin  •  oxyCODONE  •  phenazopyridine  •  potassium chloride **OR** potassium chloride **OR** potassium chloride  •  [COMPLETED] Insert Peripheral IV **AND** sodium chloride  •  sodium chloride  •  sodium chloride    Assessment & Plan   Assessment & Plan     Active Hospital Problems    Diagnosis  POA   • **Sepsis, due to unspecified organism, unspecified whether acute organ dysfunction present (HCC) [A41.9]  Yes   • Essential hypertension [I10]  Yes   • Type 2 diabetes mellitus with hyperglycemia, without long-term current use of insulin (HCC) [E11.65]  Yes   • Obesity (BMI 30-39.9) [E66.9]  Yes   • Hernia, abdominal [K46.9]  Yes   • ÁNGEL (acute kidney injury) (HCC) [N17.9]  Yes      Resolved Hospital Problems   No resolved problems to display.        Brief Hospital Course to date:  Cj Cifuentes  is a 72 y.o. male with history of BPH, acute urinary retention, DM2, hypothyroidism, HTN, HLD GERD and sleep apnea who had a recent prostatectomy and presented to the ED due to fever and was found to have sepsis due to UTI.    This patient's problems and plans were partially entered by my partner and updated as appropriate by me 01/06/23.    Sepsis due to UTI  -Urine culture with pseudomonas.  Continue Zosyn.  -Plan for US today to rule out small abscess formation per Urology.    DM2  -A1c 7.0%  -SSI  -Continue 5 units Levemir daily     ÁNGEL, mild  -Resolved with IV fluids    Hypothyroidism  -Continue synthroid    Hypokalemia, mild  -Replace per protocol     Expected Discharge Location and Transportation: Home  Expected Discharge 1/7/23  Expected Discharge Date and Time     Expected Discharge Date Expected Discharge Time    Jan 5, 2023            DVT prophylaxis:  Medical DVT prophylaxis orders are present.     AM-PAC 6 Clicks Score (PT): 20 (01/05/23 2200)    CODE STATUS:   Code Status and Medical Interventions:   Ordered at: 01/01/23 1801     Code Status (Patient has no pulse and is not breathing):    CPR (Attempt to Resuscitate)     Medical Interventions (Patient has pulse or is breathing):    Full Support       Carina Cuba, DO  01/06/23

## 2023-01-06 NOTE — CASE MANAGEMENT/SOCIAL WORK
Continued Stay Note  [facility]     Patient Name: Cj Cifuentes  MRN: [id number]  Today's Date: 1/6/2023    Admit Date: 1/1/2023    Plan: CM update   Discharge Plan     Row Name 01/06/23 1121       Plan    Plan CM update    Plan Comments Patient is not ready for discharge at this time -:Scrotal U/S without obvious abscess/drainable fluid collection however WBC count remains elevated with plan for Infectious Disease consult today. Patient remains independent of ADLs and ambulated 900 ft during PT session. Patient to return home with spouse upon discharge. CM will continue to follow for dc planning- chase [phone]    Final Discharge Disposition Code 01 - home or self-care               Discharge Codes    No documentation.               Expected Discharge Date and Time     Expected Discharge Date Expected Discharge Time    Jan 5, 2023             Chase Jarquin RN

## 2023-01-06 NOTE — PROGRESS NOTES
[facility]   Urology Progress Note    Patient Name: Cj Cifuentes  : 1950  MRN: [id number]  Primary Care Physician:  Gary Kaur MD  Date of admission: 2023    Subjective   Subjective     Chief Complaint: Scrotal pain    HPI:  Patient Resting in bed. Reports scrotal pain is a little better. Scrotal U/S today without evidence of obvious abscess. There is bilateral epididymo-orchitis. Moderate bilateral hydroceles, complex hydrocele on left.     WBC 23.51 from 22.28.   Afebile overnight    Review of Systems   All systems were reviewed and negative except for: scrotal pain    Objective   Objective     Vitals:   Temp:  [97.8 °F (36.6 °C)-99.6 °F (37.6 °C)] 97.8 °F (36.6 °C)  Heart Rate:  [69-90] 74  Resp:  [16-18] 18  BP: (127-146)/(73-86) 127/73  Flow (L/min):  [2] 2  Physical Exam    Constitutional: Awake in bed, alert   Eyes: PERRLA, sclerae anicteric, no conjunctival injection   HENT: Normocephalic, atraumatic, mucous membranes moist   Neck: Supple, trachea midline   Respiratory: Equal chest rise, non-labored respirations    Cardiovascular: RRR, palpable radial pulses bilaterally   Gastrointestinal: Soft, nontender, non-distended   Genitourinary: Buried penis. Non tender to palpation of right hemiscrotum. Mild tenderness to left hemiscrotum with induration. Erythema with moderate scrotal swelling. No open wounds.    Musculoskeletal: No lower extremity edema bilaterally, no clubbing or cyanosis to extremities   Psychiatric: Appropriate affect, cooperative   Neurologic: Oriented x 3,  Cranial Nerves grossly intact, speech clear   Skin: No rashes     Result Review    Result Review:  I have personally reviewed the results from the time of this admission to 2023 10:29 EST and agree with these findings:  [x]  Laboratory  []  Microbiology  [x]  Radiology  []  EKG/Telemetry   []  Cardiology/Vascular   []  Pathology  []  Old records  [x]  Other: Vitals      Assessment & Plan   Assessment /  Plan     Brief Patient Summary:  jC Cifuentes is a 72 y.o. male who is s/p Robotic-assisted laparoscopic simple prostatectomy with Dr. Stinson who was readmitted for epididymoorchitis.     Active Hospital Problems:  Active Hospital Problems    Diagnosis    • **Sepsis, due to unspecified organism, unspecified whether acute organ dysfunction present (HCC)    • Essential hypertension    • Type 2 diabetes mellitus with hyperglycemia, without long-term current use of insulin (HCC)    • Obesity (BMI 30-39.9)    • Hernia, abdominal    • ÁNGEL (acute kidney injury) (HCC)      Scrotal U/S without obvious abscess/drainable fluid collection.  Ok to eat, no urologic surgical intervention at this time.  Plan for Infectious Disease Consult.   will follow, please call if any questions.   D/w Dr. Stinson and Dr. Cuba.        DVT prophylaxis:  Medical DVT prophylaxis orders are present.    CODE STATUS:   Code Status (Patient has no pulse and is not breathing): CPR (Attempt to Resuscitate)  Medical Interventions (Patient has pulse or is breathing): Full Support    Disposition:  I expect patient to remain inpatient     Electronically signed by ROCIO Fortune, 01/06/23, 10:29 AM EST.
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
## 2023-01-06 NOTE — PLAN OF CARE
Goal Outcome Evaluation:  Plan of Care Reviewed With: patient            VSS, RA, SR, continues to have scrotal edema and some episodes of incontinence. ID consulted today and antbx changed . Will continue current plan of care

## 2023-01-07 LAB
ANION GAP SERPL CALCULATED.3IONS-SCNC: 12 MMOL/L (ref 5–15)
BASOPHILS # BLD AUTO: 0.11 10*3/MM3 (ref 0–0.2)
BASOPHILS NFR BLD AUTO: 0.6 % (ref 0–1.5)
BUN SERPL-MCNC: 12 MG/DL (ref 8–23)
BUN/CREAT SERPL: 18.5 (ref 7–25)
CALCIUM SPEC-SCNC: 8.8 MG/DL (ref 8.6–10.5)
CHLORIDE SERPL-SCNC: 101 MMOL/L (ref 98–107)
CO2 SERPL-SCNC: 23 MMOL/L (ref 22–29)
CREAT SERPL-MCNC: 0.65 MG/DL (ref 0.76–1.27)
DEPRECATED RDW RBC AUTO: 42.5 FL (ref 37–54)
EGFRCR SERPLBLD CKD-EPI 2021: 101.4 ML/MIN/1.73
EOSINOPHIL # BLD AUTO: 0.33 10*3/MM3 (ref 0–0.4)
EOSINOPHIL NFR BLD AUTO: 1.8 % (ref 0.3–6.2)
ERYTHROCYTE [DISTWIDTH] IN BLOOD BY AUTOMATED COUNT: 13.6 % (ref 12.3–15.4)
GLUCOSE BLDC GLUCOMTR-MCNC: 140 MG/DL (ref 70–130)
GLUCOSE BLDC GLUCOMTR-MCNC: 159 MG/DL (ref 70–130)
GLUCOSE BLDC GLUCOMTR-MCNC: 162 MG/DL (ref 70–130)
GLUCOSE SERPL-MCNC: 172 MG/DL (ref 65–99)
HCT VFR BLD AUTO: 33.2 % (ref 37.5–51)
HGB BLD-MCNC: 11.2 G/DL (ref 13–17.7)
IMM GRANULOCYTES # BLD AUTO: 0.53 10*3/MM3 (ref 0–0.05)
IMM GRANULOCYTES NFR BLD AUTO: 2.9 % (ref 0–0.5)
LYMPHOCYTES # BLD AUTO: 1.53 10*3/MM3 (ref 0.7–3.1)
LYMPHOCYTES NFR BLD AUTO: 8.4 % (ref 19.6–45.3)
MCH RBC QN AUTO: 28.9 PG (ref 26.6–33)
MCHC RBC AUTO-ENTMCNC: 33.7 G/DL (ref 31.5–35.7)
MCV RBC AUTO: 85.6 FL (ref 79–97)
MONOCYTES # BLD AUTO: 1.11 10*3/MM3 (ref 0.1–0.9)
MONOCYTES NFR BLD AUTO: 6.1 % (ref 5–12)
NEUTROPHILS NFR BLD AUTO: 14.6 10*3/MM3 (ref 1.7–7)
NEUTROPHILS NFR BLD AUTO: 80.2 % (ref 42.7–76)
NRBC BLD AUTO-RTO: 0 /100 WBC (ref 0–0.2)
PLATELET # BLD AUTO: 423 10*3/MM3 (ref 140–450)
PMV BLD AUTO: 9 FL (ref 6–12)
POTASSIUM SERPL-SCNC: 4.2 MMOL/L (ref 3.5–5.2)
RBC # BLD AUTO: 3.88 10*6/MM3 (ref 4.14–5.8)
SODIUM SERPL-SCNC: 136 MMOL/L (ref 136–145)
WBC NRBC COR # BLD: 18.21 10*3/MM3 (ref 3.4–10.8)

## 2023-01-07 PROCEDURE — 99231 SBSQ HOSP IP/OBS SF/LOW 25: CPT | Performed by: FAMILY MEDICINE

## 2023-01-07 PROCEDURE — 25010000002 HEPARIN (PORCINE) PER 1000 UNITS: Performed by: NURSE PRACTITIONER

## 2023-01-07 PROCEDURE — 82962 GLUCOSE BLOOD TEST: CPT

## 2023-01-07 PROCEDURE — 99232 SBSQ HOSP IP/OBS MODERATE 35: CPT | Performed by: UROLOGY

## 2023-01-07 PROCEDURE — 63710000001 INSULIN LISPRO (HUMAN) PER 5 UNITS: Performed by: NURSE PRACTITIONER

## 2023-01-07 PROCEDURE — 80048 BASIC METABOLIC PNL TOTAL CA: CPT | Performed by: FAMILY MEDICINE

## 2023-01-07 PROCEDURE — 63710000001 INSULIN DETEMIR PER 5 UNITS: Performed by: FAMILY MEDICINE

## 2023-01-07 PROCEDURE — 85025 COMPLETE CBC W/AUTO DIFF WBC: CPT | Performed by: FAMILY MEDICINE

## 2023-01-07 RX ORDER — SACCHAROMYCES BOULARDII 250 MG
250 CAPSULE ORAL 2 TIMES DAILY
Status: DISCONTINUED | OUTPATIENT
Start: 2023-01-07 | End: 2023-01-09 | Stop reason: HOSPADM

## 2023-01-07 RX ADMIN — TAMSULOSIN HYDROCHLORIDE 0.4 MG: 0.4 CAPSULE ORAL at 20:59

## 2023-01-07 RX ADMIN — INSULIN LISPRO 0 UNITS: 100 INJECTION, SOLUTION INTRAVENOUS; SUBCUTANEOUS at 08:16

## 2023-01-07 RX ADMIN — Medication 5 MG: at 20:59

## 2023-01-07 RX ADMIN — ATORVASTATIN CALCIUM 20 MG: 20 TABLET, FILM COATED ORAL at 20:59

## 2023-01-07 RX ADMIN — LEVOFLOXACIN 750 MG: 750 TABLET, FILM COATED ORAL at 08:16

## 2023-01-07 RX ADMIN — Medication 10 ML: at 21:00

## 2023-01-07 RX ADMIN — HEPARIN SODIUM 5000 UNITS: 5000 INJECTION INTRAVENOUS; SUBCUTANEOUS at 21:00

## 2023-01-07 RX ADMIN — Medication 250 MG: at 20:59

## 2023-01-07 RX ADMIN — OXYCODONE 5 MG: 5 TABLET ORAL at 08:23

## 2023-01-07 RX ADMIN — ACETAMINOPHEN 1000 MG: 500 TABLET, FILM COATED ORAL at 20:59

## 2023-01-07 RX ADMIN — Medication 250 MG: at 13:00

## 2023-01-07 RX ADMIN — ACETAMINOPHEN 1000 MG: 500 TABLET, FILM COATED ORAL at 13:00

## 2023-01-07 RX ADMIN — LEVOTHYROXINE SODIUM 50 MCG: 50 TABLET ORAL at 05:29

## 2023-01-07 RX ADMIN — ACETAMINOPHEN 1000 MG: 500 TABLET, FILM COATED ORAL at 05:29

## 2023-01-07 RX ADMIN — HEPARIN SODIUM 5000 UNITS: 5000 INJECTION INTRAVENOUS; SUBCUTANEOUS at 05:29

## 2023-01-07 RX ADMIN — INSULIN DETEMIR 5 UNITS: 100 INJECTION, SOLUTION SUBCUTANEOUS at 08:16

## 2023-01-07 RX ADMIN — INSULIN LISPRO 2 UNITS: 100 INJECTION, SOLUTION INTRAVENOUS; SUBCUTANEOUS at 17:11

## 2023-01-07 RX ADMIN — HEPARIN SODIUM 5000 UNITS: 5000 INJECTION INTRAVENOUS; SUBCUTANEOUS at 17:11

## 2023-01-07 NOTE — PROGRESS NOTES
[facility]   Urology Progress Note    Patient Name: Cj Cifuentes  : 1950  MRN: [id number]  Primary Care Physician:  Gary Kaur MD  Date of admission: 2023    Subjective   Subjective     Chief Complaint: History of LUTS, epididymoorchitis    HPI:  Afebrile and hemodynamically stable overnight.  Patient reports voiding without difficulty.  Denies dysuria.  Continues to have scrotal erythema and edema, left greater than right.  Tender to palpation.  Reports episode of diarrhea overnight.    Review of Systems   The following systems were reviewed and negative;  constitution, respiratory, cardiovascular, gastrointestinal, genitourinary, integument, hematologic / lymphatic, musculoskeletal, neurological and behavioral/psych.   : Scrotal edema and discomfort    Objective   Objective     Vitals:   Temp:  [98 °F (36.7 °C)-98.8 °F (37.1 °C)] 98.2 °F (36.8 °C)  Heart Rate:  [] 87  Resp:  [18] 18  BP: (139-155)/(76-93) 144/76  Flow (L/min):  [2] 2  Physical Exam    Constitutional: Awake in bed, alert   Eyes: PERRLA, sclerae anicteric, no conjunctival injection   HENT: Normocephalic, atraumatic, mucous membranes moist   Neck: Supple, trachea midline   Respiratory: Equal chest rise, non-labored respirations    Cardiovascular: Perfused, no edema   Gastrointestinal: Soft, nontender, non-distended   Genitourinary: normal phallus, orthotopic meatus, bilaterally scrotal edema, minimal erythema.  Tender to palpation.   Musculoskeletal: No lower extremity edema bilaterally, no clubbing or cyanosis to extremities   Psychiatric: Appropriate affect, cooperative   Neurologic: Oriented x 3,  Cranial Nerves grossly intact, speech clear   Skin: No rashes     Result Review    Result Review:  I have personally reviewed the results from the time of this admission to 2023 10:45 EST and agree with these findings:  [x]  Laboratory  [x]  Microbiology  []  Radiology  []  EKG/Telemetry   []   Cardiology/Vascular   []  Pathology  [x]  Old records  []  Other:    Most notable findings include:   WBC decreased to 18.2 from 23.5  Creatinine 0.6    Assessment & Plan   Assessment / Plan     Brief Patient Summary:  Cj Cifuentes is a 72 y.o. male who is status post robotic assisted laparoscopic simple prostatectomy for lower urinary tract symptoms.  The patient has had readmission for epididymoorchitis.  He continues to void without difficulty.  Infectious disease consulted on 1/6/2023 for recommendations on antibiotic therapy.  He has had continued elevation of WBC during admission.  Today, 1/7 WBC has decreased to 18 from 23.    Active Hospital Problems:  Active Hospital Problems    Diagnosis    • **Sepsis, due to unspecified organism, unspecified whether acute organ dysfunction present (HCC)    • Essential hypertension    • Type 2 diabetes mellitus with hyperglycemia, without long-term current use of insulin (HCC)    • Obesity (BMI 30-39.9)    • Hernia, abdominal    • ÁNGEL (acute kidney injury) ([facility])        Plan:   Continue supportive care for scrotal edema.  Appreciate ID recommendations for antibiotic therapy.  Continue to monitor appropriate voiding and bladder emptying.  Continue monitoring for 24 to 48 hours to ensure clinical convalescence and improvement in WBC on antibiotic therapy       DVT prophylaxis:  Medical DVT prophylaxis orders are present.    CODE STATUS:   Code Status (Patient has no pulse and is not breathing): CPR (Attempt to Resuscitate)  Medical Interventions (Patient has pulse or is breathing): Full Support    Disposition: Per primary team    Electronically signed by Artis Jolly MD, 01/07/23, 10:45 AM EST.

## 2023-01-07 NOTE — PLAN OF CARE
Goal Outcome Evaluation:  Plan of Care Reviewed With: patient        Progress: improving   VSS, 02 2L, SR, scrotal swelling has decreased. Will continue current plan of care

## 2023-01-07 NOTE — PROGRESS NOTES
[facility] Medicine Services  PROGRESS NOTE    Patient Name: Cj Cifuentes  : 1950  MRN: [id number]    Date of Admission: 2023  Primary Care Physician: Gary Kaur MD    Subjective   Subjective     CC:  F/U sepsis due to UTI    HPI:  Patient seen and examined. Feels fine today. Discussed his WBC count is better. Says he had some diarrhea this morning. No N/V or abdominal pain. Afebrile.     ROS:  Gen- No fevers, chills  CV- No chest pain, palpitations  Resp- No cough, dyspnea  GI- No N/V, abd pain, +diarrhea     Objective   Objective     Vital Signs:   Temp:  [98 °F (36.7 °C)-98.8 °F (37.1 °C)] 98.2 °F (36.8 °C)  Heart Rate:  [] 87  Resp:  [18] 18  BP: (139-155)/(76-93) 144/76  Flow (L/min):  [2] 2     Physical Exam:  Constitutional: No acute distress, awake, alert, sitting up in bed   HENT: NCAT, mucous membranes moist  Respiratory: Respiratory effort normal, Clear to Auscultation B/L   Cardiovascular: RRR, no murmurs  Gastrointestinal: BSx4, non-tender, non-distended   : +scrotal scwelling/Tenderness   Musculoskeletal: No bilateral ankle edema  Psychiatric: Appropriate affect, cooperative  Neurologic: Speech clear  Skin: No rashes on exposed surfaces  *No change in exam    Results Reviewed:  LAB RESULTS:      Lab 23  0656 23  0624 23  0820 23  0722 23  0602 23  0810 23  1828 23  1322   WBC 18.21* 23.51* 22.28* 21.15* 30.46*   < >  --  47.57*   HEMOGLOBIN 11.2* 11.1* 11.3* 9.9* 9.8*   < >  --  12.9*   HEMATOCRIT 33.2* 32.2* 33.4* 28.8* 28.7*   < >  --  37.6   PLATELETS 423 375 354 297 291   < >  --  333   NEUTROS ABS 14.60* 19.98* 19.31* 18.07* 27.57*   < >  --  43.18*   IMMATURE GRANS (ABS) 0.53* 0.33* 0.17* 0.58* 0.84*   < >  --  1.74*   LYMPHS ABS 1.53 1.46 1.14 1.11 1.00   < >  --  0.50*   MONOS ABS 1.11* 1.34* 1.37* 1.07* 0.91*   < >  --  2.10*   EOS ABS 0.33 0.31 0.22 0.27 0.09   < >  --  0.01   MCV  85.6 85.6 87.2 85.0 85.4   < >  --  87.0   PROCALCITONIN  --   --   --   --   --   --   --  6.79*   LACTATE  --   --   --   --   --   --  4.3* 4.2*    < > = values in this interval not displayed.         Lab 01/07/23  0656 01/06/23  0624 01/05/23  0820 01/04/23  0722 01/03/23  0602 01/02/23  0810   SODIUM 136 134* 134* 135* 135* 134*   POTASSIUM 4.2 3.9 4.2 3.4* 3.8 3.6   CHLORIDE 101 98 99 101 103 100   CO2 23.0 23.0 25.0 23.0 22.0 20.0*   ANION GAP 12.0 13.0 10.0 11.0 10.0 14.0   BUN 12 10 9 12 11 10   CREATININE 0.65* 0.60* 0.62* 0.55* 0.56* 0.72*   EGFR 101.4 103.8 102.8 106.6 106.0 98.3   GLUCOSE 172* 137* 222* 188* 171* 175*   CALCIUM 8.8 8.6 8.6 8.2* 8.3* 8.6   MAGNESIUM  --   --  1.6  --   --  1.5*         Lab 01/01/23 1322   TOTAL PROTEIN 7.4   ALBUMIN 3.7   GLOBULIN 3.7   ALT (SGPT) 39   AST (SGOT) 24   BILIRUBIN 1.6*   ALK PHOS 128*                     Brief Urine Lab Results  (Last result in the past 365 days)      Color   Clarity   Blood   Leuk Est   Nitrite   Protein   CREAT   Urine HCG        01/01/23 1735 Yellow   Cloudy   Large (3+)   Large (3+)   Negative   30 mg/dL (1+)                 Microbiology Results Abnormal     Procedure Component Value - Date/Time    Blood Culture - Blood, Hand, Right [[id number]]  (Normal) Collected: 01/01/23 1322    Lab Status: Final result Specimen: Blood from Hand, Right Updated: 01/06/23 1345     Blood Culture No growth at 5 days    Blood Culture - Blood, Arm, Right [[id number]]  (Normal) Collected: 01/01/23 1322    Lab Status: Final result Specimen: Blood from Arm, Right Updated: 01/06/23 1345     Blood Culture No growth at 5 days    MRSA Screen, PCR (Inpatient) - Swab, Nares [[id number]]  (Normal) Collected: 01/02/23 0102    Lab Status: Final result Specimen: Swab from Nares Updated: 01/02/23 0910     MRSA PCR Negative    Narrative:      The negative predictive value of this diagnostic test is high and should only be used to consider de-escalating anti-MRSA therapy.  A positive result may indicate colonization with MRSA and must be correlated clinically.  MRSA Negative    COVID PRE-OP / PRE-PROCEDURE SCREENING ORDER (NO ISOLATION) - Swab, Nasopharynx [[id number]]  (Normal) Collected: 01/01/23 1325    Lab Status: Final result Specimen: Swab from Nasopharynx Updated: 01/01/23 7137    Narrative:      The following orders were created for panel order COVID PRE-OP / PRE-PROCEDURE SCREENING ORDER (NO ISOLATION) - Swab, Nasopharynx.  Procedure                               Abnormality         Status                     ---------                               -----------         ------                     COVID-19 and FLU A/B PCR...[[id number]]  Normal              Final result                 Please view results for these tests on the individual orders.    COVID-19 and FLU A/B PCR - Swab, Nasopharynx [[id number]]  (Normal) Collected: 01/01/23 1322    Lab Status: Final result Specimen: Swab from Nasopharynx Updated: 01/01/23 9038     COVID19 Not Detected     Influenza A PCR Not Detected     Influenza B PCR Not Detected    Narrative:      Fact sheet for providers: https://www.fda.gov/media/916137/download    Fact sheet for patients: https://www.fda.gov/media/616545/download    Test performed by PCR.          US Scrotum & Testicles    Result Date: 1/6/2023  US SCROTUM AND TESTICLES Date of Exam: 1/6/2023 8:27 AM EST Indication: Epididymoorchitis, elevated WBC. Comparison: January 1, 2023 Technique: Multiple sonographic images of the scrotum were obtained in transverse and longitudinal planes. Grayscale and color Doppler duplex techniques were utilized. Findings: There is diffuse scrotal wall thickening. Both testicles appear normal in size and echotexture, although the right testicle appears hypervascular. The right testicle measures 4.6 x 3.9 x 3.5 cm, while the left testicle measures 5.5 x 3.5 x 4.2 cm. There are moderate bilateral hydroceles, complex on the left. The bilateral epididymis  appear hypervascular. There is hyperemia within the soft tissues lateral to both testicles.     Impression: Impression: Diffuse scrotal wall thickening with hyperemia in soft tissues lateral to both testicles, and hyperemia of right testicle and hyperemia of both epididymis, suggesting bilateral epididymo-orchitis. Moderate bilateral hydroceles complex on the left. Electronically Signed: Ryan Ybarra  1/6/2023 9:26 AM EST  Workstation ID: [id number]          I have reviewed the medications:  Scheduled Meds:acetaminophen, 1,000 mg, Oral, Q8H  atorvastatin, 20 mg, Oral, Nightly  heparin (porcine), 5,000 Units, Subcutaneous, Q8H  insulin detemir, 5 Units, Subcutaneous, Daily  insulin lispro, 0-7 Units, Subcutaneous, TID AC  levoFLOXacin, 750 mg, Oral, Daily  levothyroxine, 50 mcg, Oral, Q AM  saccharomyces boulardii, 250 mg, Oral, BID  senna-docusate sodium, 2 tablet, Oral, BID  sodium chloride, 10 mL, Intravenous, Q12H  tamsulosin, 0.4 mg, Oral, Nightly      Continuous Infusions:   PRN Meds:.•  senna-docusate sodium **AND** polyethylene glycol **AND** bisacodyl **AND** bisacodyl  •  cyclobenzaprine  •  dextrose  •  dextrose  •  glucagon (human recombinant)  •  hyoscyamine sulfate  •  magnesium sulfate **OR** magnesium sulfate **OR** magnesium sulfate  •  melatonin  •  ondansetron  •  oxybutynin  •  oxyCODONE  •  phenazopyridine  •  potassium chloride **OR** potassium chloride **OR** potassium chloride  •  sodium chloride  •  sodium chloride    Assessment & Plan   Assessment & Plan     Active Hospital Problems    Diagnosis  POA   • **Sepsis, due to unspecified organism, unspecified whether acute organ dysfunction present (HCC) [A41.9]  Yes   • Essential hypertension [I10]  Yes   • Type 2 diabetes mellitus with hyperglycemia, without long-term current use of insulin (HCC) [E11.65]  Yes   • Obesity (BMI 30-39.9) [E66.9]  Yes   • Hernia, abdominal [K46.9]  Yes   • ÁNGEL (acute kidney injury) (HCC) [N17.9]  Yes       Resolved Hospital Problems   No resolved problems to display.        Brief Hospital Course to date:  Cj Cifuentes is a 72 y.o. male with history of BPH, acute urinary retention, DM2, hypothyroidism, HTN, HLD GERD and sleep apnea who had a recent prostatectomy and presented to the ED due to fever and was found to have sepsis due to UTI.    This patient's problems and plans were partially entered by my partner and updated as appropriate by me 01/07/23.    Sepsis due to UTI  -Urine culture with pseudomonas.  Zosyn changed to PO Levaquin per ID and WBC count trending down  -US scrotum/testicles 1/6, no abscess     Diarrhea  -Likely secondary to ABX  -Pt afebrile  -Add probiotic     DM2  -A1c 7.0%  -SSI  -Continue 5 units Levemir daily     ÁNGEL, mild  -Resolved with IV fluids    Hypothyroidism  -Continue synthroid    Hypokalemia, mild  -Replace per protocol     Expected Discharge Location and Transportation: Home when ok with Urology  Expected Discharge 1/8/23  Expected Discharge Date and Time     Expected Discharge Date Expected Discharge Time    Jan 5, 2023            DVT prophylaxis:  Medical DVT prophylaxis orders are present.     AM-PAC 6 Clicks Score (PT): 21 (01/06/23 0800)    CODE STATUS:   Code Status and Medical Interventions:   Ordered at: 01/01/23 1801     Code Status (Patient has no pulse and is not breathing):    CPR (Attempt to Resuscitate)     Medical Interventions (Patient has pulse or is breathing):    Full Support       Carina Cuba, DO  01/07/23
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
## 2023-01-08 LAB
BASOPHILS # BLD AUTO: 0.08 10*3/MM3 (ref 0–0.2)
BASOPHILS NFR BLD AUTO: 0.6 % (ref 0–1.5)
DEPRECATED RDW RBC AUTO: 43 FL (ref 37–54)
EOSINOPHIL # BLD AUTO: 0.36 10*3/MM3 (ref 0–0.4)
EOSINOPHIL NFR BLD AUTO: 2.5 % (ref 0.3–6.2)
ERYTHROCYTE [DISTWIDTH] IN BLOOD BY AUTOMATED COUNT: 13.6 % (ref 12.3–15.4)
GLUCOSE BLDC GLUCOMTR-MCNC: 146 MG/DL (ref 70–130)
GLUCOSE BLDC GLUCOMTR-MCNC: 150 MG/DL (ref 70–130)
GLUCOSE BLDC GLUCOMTR-MCNC: 165 MG/DL (ref 70–130)
HCT VFR BLD AUTO: 34.2 % (ref 37.5–51)
HGB BLD-MCNC: 11.5 G/DL (ref 13–17.7)
IMM GRANULOCYTES # BLD AUTO: 0.4 10*3/MM3 (ref 0–0.05)
IMM GRANULOCYTES NFR BLD AUTO: 2.8 % (ref 0–0.5)
LYMPHOCYTES # BLD AUTO: 1.69 10*3/MM3 (ref 0.7–3.1)
LYMPHOCYTES NFR BLD AUTO: 11.7 % (ref 19.6–45.3)
MAGNESIUM SERPL-MCNC: 1.9 MG/DL (ref 1.6–2.4)
MCH RBC QN AUTO: 29 PG (ref 26.6–33)
MCHC RBC AUTO-ENTMCNC: 33.6 G/DL (ref 31.5–35.7)
MCV RBC AUTO: 86.1 FL (ref 79–97)
MONOCYTES # BLD AUTO: 0.92 10*3/MM3 (ref 0.1–0.9)
MONOCYTES NFR BLD AUTO: 6.4 % (ref 5–12)
NEUTROPHILS NFR BLD AUTO: 11.01 10*3/MM3 (ref 1.7–7)
NEUTROPHILS NFR BLD AUTO: 76 % (ref 42.7–76)
NRBC BLD AUTO-RTO: 0 /100 WBC (ref 0–0.2)
PLATELET # BLD AUTO: 466 10*3/MM3 (ref 140–450)
PMV BLD AUTO: 9.1 FL (ref 6–12)
RBC # BLD AUTO: 3.97 10*6/MM3 (ref 4.14–5.8)
WBC NRBC COR # BLD: 14.46 10*3/MM3 (ref 3.4–10.8)

## 2023-01-08 PROCEDURE — 83735 ASSAY OF MAGNESIUM: CPT | Performed by: FAMILY MEDICINE

## 2023-01-08 PROCEDURE — 0 MAGNESIUM SULFATE 4 GM/100ML SOLUTION: Performed by: INTERNAL MEDICINE

## 2023-01-08 PROCEDURE — 85025 COMPLETE CBC W/AUTO DIFF WBC: CPT | Performed by: FAMILY MEDICINE

## 2023-01-08 PROCEDURE — 99232 SBSQ HOSP IP/OBS MODERATE 35: CPT | Performed by: UROLOGY

## 2023-01-08 PROCEDURE — 63710000001 INSULIN DETEMIR PER 5 UNITS: Performed by: FAMILY MEDICINE

## 2023-01-08 PROCEDURE — 99232 SBSQ HOSP IP/OBS MODERATE 35: CPT | Performed by: NURSE PRACTITIONER

## 2023-01-08 PROCEDURE — 82962 GLUCOSE BLOOD TEST: CPT

## 2023-01-08 PROCEDURE — 25010000002 HEPARIN (PORCINE) PER 1000 UNITS: Performed by: NURSE PRACTITIONER

## 2023-01-08 PROCEDURE — 63710000001 INSULIN LISPRO (HUMAN) PER 5 UNITS: Performed by: NURSE PRACTITIONER

## 2023-01-08 PROCEDURE — 97110 THERAPEUTIC EXERCISES: CPT

## 2023-01-08 PROCEDURE — 97116 GAIT TRAINING THERAPY: CPT

## 2023-01-08 RX ADMIN — ATORVASTATIN CALCIUM 20 MG: 20 TABLET, FILM COATED ORAL at 21:05

## 2023-01-08 RX ADMIN — ACETAMINOPHEN 1000 MG: 500 TABLET, FILM COATED ORAL at 21:05

## 2023-01-08 RX ADMIN — ACETAMINOPHEN 1000 MG: 500 TABLET, FILM COATED ORAL at 14:51

## 2023-01-08 RX ADMIN — ACETAMINOPHEN 1000 MG: 500 TABLET, FILM COATED ORAL at 05:23

## 2023-01-08 RX ADMIN — Medication 10 ML: at 21:05

## 2023-01-08 RX ADMIN — LEVOFLOXACIN 750 MG: 750 TABLET, FILM COATED ORAL at 08:53

## 2023-01-08 RX ADMIN — INSULIN DETEMIR 5 UNITS: 100 INJECTION, SOLUTION SUBCUTANEOUS at 08:53

## 2023-01-08 RX ADMIN — Medication 250 MG: at 08:53

## 2023-01-08 RX ADMIN — HEPARIN SODIUM 5000 UNITS: 5000 INJECTION INTRAVENOUS; SUBCUTANEOUS at 05:23

## 2023-01-08 RX ADMIN — HEPARIN SODIUM 5000 UNITS: 5000 INJECTION INTRAVENOUS; SUBCUTANEOUS at 14:50

## 2023-01-08 RX ADMIN — HEPARIN SODIUM 5000 UNITS: 5000 INJECTION INTRAVENOUS; SUBCUTANEOUS at 21:05

## 2023-01-08 RX ADMIN — TAMSULOSIN HYDROCHLORIDE 0.4 MG: 0.4 CAPSULE ORAL at 21:05

## 2023-01-08 RX ADMIN — Medication 250 MG: at 21:05

## 2023-01-08 RX ADMIN — LEVOTHYROXINE SODIUM 50 MCG: 50 TABLET ORAL at 05:23

## 2023-01-08 RX ADMIN — INSULIN LISPRO 2 UNITS: 100 INJECTION, SOLUTION INTRAVENOUS; SUBCUTANEOUS at 17:22

## 2023-01-08 RX ADMIN — INSULIN LISPRO 2 UNITS: 100 INJECTION, SOLUTION INTRAVENOUS; SUBCUTANEOUS at 08:53

## 2023-01-08 RX ADMIN — MAGNESIUM SULFATE HEPTAHYDRATE 4 G: 40 INJECTION, SOLUTION INTRAVENOUS at 17:41

## 2023-01-08 RX ADMIN — OXYCODONE 5 MG: 5 TABLET ORAL at 08:52

## 2023-01-08 NOTE — PROGRESS NOTES
[facility]  PROGRESS NOTE    Patient Name: Cj Cifuentes  : 1950  MRN: [id number]    Date of Admission: 2023  Primary Care Physician: Gary Kaur MD    Subjective   Subjective     CC:  Follow up sepsis d/t UTI, orchitis     HPI:  Patient seen ambulating in [place] with therapy this morning. He reports increased scrotal pain with ambulation, but notes that edema has improved. No new complaints at this time.     ROS:  Gen- No fevers, chills  CV- No chest pain, palpitations  Resp- No cough, dyspnea  GI- No N/V/D, abd pain    Objective   Objective     Vital Signs:   Temp:  [97.8 °F (36.6 °C)-98.2 °F (36.8 °C)] 97.9 °F (36.6 °C)  Heart Rate:  [] 77  Resp:  [16-18] 16  BP: (129-136)/(80-89) 129/89  Flow (L/min):  [2] 2     Physical Exam:  Constitutional: No acute distress, awake, alert  HENT: NCAT, mucous membranes moist  Respiratory: Clear to auscultation bilaterally, respiratory effort normal   Cardiovascular: RRR, no murmurs, cap refill brisk   Gastrointestinal: Positive bowel sounds, soft, nontender, nondistended  : +scrotal edema, tender to palpation  Musculoskeletal: No BLE edema   Psychiatric: Appropriate affect, cooperative  Neurologic: Oriented x 3, moves all extremities, speech clear  Skin: warm, dry, no visible rash     Results Reviewed:  LAB RESULTS:      Lab 23  0656 23  0624 23  0820 23  0722 23  0602 23  0810 23  1828 23  1322   WBC 18.21* 23.51* 22.28* 21.15* 30.46*   < >  --  47.57*   HEMOGLOBIN 11.2* 11.1* 11.3* 9.9* 9.8*   < >  --  12.9*   HEMATOCRIT 33.2* 32.2* 33.4* 28.8* 28.7*   < >  --  37.6   PLATELETS 423 375 354 297 291   < >  --  333   NEUTROS ABS 14.60* 19.98* 19.31* 18.07* 27.57*   < >  --  43.18*   IMMATURE GRANS (ABS) 0.53* 0.33* 0.17* 0.58* 0.84*   < >  --  1.74*   LYMPHS ABS 1.53 1.46 1.14 1.11 1.00   < >  --  0.50*   MONOS ABS 1.11* 1.34* 1.37* 1.07* 0.91*   < >  --   2.10*   EOS ABS 0.33 0.31 0.22 0.27 0.09   < >  --  0.01   MCV 85.6 85.6 87.2 85.0 85.4   < >  --  87.0   PROCALCITONIN  --   --   --   --   --   --   --  6.79*   LACTATE  --   --   --   --   --   --  4.3* 4.2*    < > = values in this interval not displayed.         Lab 01/07/23  0656 01/06/23  0624 01/05/23  0820 01/04/23  0722 01/03/23  0602 01/02/23  0810   SODIUM 136 134* 134* 135* 135* 134*   POTASSIUM 4.2 3.9 4.2 3.4* 3.8 3.6   CHLORIDE 101 98 99 101 103 100   CO2 23.0 23.0 25.0 23.0 22.0 20.0*   ANION GAP 12.0 13.0 10.0 11.0 10.0 14.0   BUN 12 10 9 12 11 10   CREATININE 0.65* 0.60* 0.62* 0.55* 0.56* 0.72*   EGFR 101.4 103.8 102.8 106.6 106.0 98.3   GLUCOSE 172* 137* 222* 188* 171* 175*   CALCIUM 8.8 8.6 8.6 8.2* 8.3* 8.6   MAGNESIUM  --   --  1.6  --   --  1.5*         Lab 01/01/23  1322   TOTAL PROTEIN 7.4   ALBUMIN 3.7   GLOBULIN 3.7   ALT (SGPT) 39   AST (SGOT) 24   BILIRUBIN 1.6*   ALK PHOS 128*                     Brief Urine Lab Results  (Last result in the past 365 days)      Color   Clarity   Blood   Leuk Est   Nitrite   Protein   CREAT   Urine HCG        01/01/23 1735 Yellow   Cloudy   Large (3+)   Large (3+)   Negative   30 mg/dL (1+)                 Microbiology Results Abnormal     Procedure Component Value - Date/Time    Blood Culture - Blood, Hand, Right [[id number]]  (Normal) Collected: 01/01/23 1322    Lab Status: Final result Specimen: Blood from Hand, Right Updated: 01/06/23 1345     Blood Culture No growth at 5 days    Blood Culture - Blood, Arm, Right [[id number]]  (Normal) Collected: 01/01/23 1322    Lab Status: Final result Specimen: Blood from Arm, Right Updated: 01/06/23 1345     Blood Culture No growth at 5 days    MRSA Screen, PCR (Inpatient) - Swab, Nares [[id number]]  (Normal) Collected: 01/02/23 0102    Lab Status: Final result Specimen: Swab from Nares Updated: 01/02/23 0910     MRSA PCR Negative    Narrative:      The negative predictive value of this diagnostic test is high and  should only be used to consider de-escalating anti-MRSA therapy. A positive result may indicate colonization with MRSA and must be correlated clinically.  MRSA Negative    COVID PRE-OP / PRE-PROCEDURE SCREENING ORDER (NO ISOLATION) - Swab, Nasopharynx [[id number]]  (Normal) Collected: 01/01/23 1325    Lab Status: Final result Specimen: Swab from Nasopharynx Updated: 01/01/23 8441    Narrative:      The following orders were created for panel order COVID PRE-OP / PRE-PROCEDURE SCREENING ORDER (NO ISOLATION) - Swab, Nasopharynx.  Procedure                               Abnormality         Status                     ---------                               -----------         ------                     COVID-19 and FLU A/B PCR...[[id number]]  Normal              Final result                 Please view results for these tests on the individual orders.    COVID-19 and FLU A/B PCR - Swab, Nasopharynx [[id number]]  (Normal) Collected: 01/01/23 1325    Lab Status: Final result Specimen: Swab from Nasopharynx Updated: 01/01/23 7240     COVID19 Not Detected     Influenza A PCR Not Detected     Influenza B PCR Not Detected    Narrative:      Fact sheet for providers: https://www.fda.gov/media/200514/download    Fact sheet for patients: https://www.fda.gov/media/784764/download    Test performed by PCR.          US Scrotum & Testicles    Result Date: 1/6/2023  US SCROTUM AND TESTICLES Date of Exam: 1/6/2023 8:27 AM EST Indication: Epididymoorchitis, elevated WBC. Comparison: January 1, 2023 Technique: Multiple sonographic images of the scrotum were obtained in transverse and longitudinal planes. Grayscale and color Doppler duplex techniques were utilized. Findings: There is diffuse scrotal wall thickening. Both testicles appear normal in size and echotexture, although the right testicle appears hypervascular. The right testicle measures 4.6 x 3.9 x 3.5 cm, while the left testicle measures 5.5 x 3.5 x 4.2 cm. There are moderate  bilateral hydroceles, complex on the left. The bilateral epididymis appear hypervascular. There is hyperemia within the soft tissues lateral to both testicles.     Impression: Impression: Diffuse scrotal wall thickening with hyperemia in soft tissues lateral to both testicles, and hyperemia of right testicle and hyperemia of both epididymis, suggesting bilateral epididymo-orchitis. Moderate bilateral hydroceles complex on the left. Electronically Signed: Ryan Ybarra  1/6/2023 9:26 AM EST  Workstation ID: [id number]          I have reviewed the medications:  Scheduled Meds:acetaminophen, 1,000 mg, Oral, Q8H  atorvastatin, 20 mg, Oral, Nightly  heparin (porcine), 5,000 Units, Subcutaneous, Q8H  insulin detemir, 5 Units, Subcutaneous, Daily  insulin lispro, 0-7 Units, Subcutaneous, TID AC  levoFLOXacin, 750 mg, Oral, Daily  levothyroxine, 50 mcg, Oral, Q AM  saccharomyces boulardii, 250 mg, Oral, BID  senna-docusate sodium, 2 tablet, Oral, BID  sodium chloride, 10 mL, Intravenous, Q12H  tamsulosin, 0.4 mg, Oral, Nightly      Continuous Infusions:   PRN Meds:.•  senna-docusate sodium **AND** polyethylene glycol **AND** bisacodyl **AND** bisacodyl  •  cyclobenzaprine  •  dextrose  •  dextrose  •  glucagon (human recombinant)  •  hyoscyamine sulfate  •  magnesium sulfate **OR** magnesium sulfate **OR** magnesium sulfate  •  melatonin  •  ondansetron  •  oxybutynin  •  oxyCODONE  •  phenazopyridine  •  potassium chloride **OR** potassium chloride **OR** potassium chloride  •  sodium chloride  •  sodium chloride    Assessment & Plan   Assessment & Plan     Active Hospital Problems    Diagnosis  POA   • **Sepsis, due to unspecified organism, unspecified whether acute organ dysfunction present (HCC) [A41.9]  Yes   • Essential hypertension [I10]  Yes   • Type 2 diabetes mellitus with hyperglycemia, without long-term current use of insulin (HCC) [E11.65]  Yes   • Obesity (BMI 30-39.9) [E66.9]  Yes   • Hernia, abdominal  [K46.9]  Yes   • ÁNGEL (acute kidney injury) (HCC) [N17.9]  Yes      Resolved Hospital Problems   No resolved problems to display.        Brief Hospital Course to date:  Cj Cifuentes is a 72 y.o. male  with history of BPH, acute urinary retention, DM2, hypothyroidism, HTN, HLD GERD and sleep apnea who had a recent prostatectomy and presented to the ED on 1/1/2023 due to fever and was found to have sepsis due to UTI.     This patient's problems and plans were partially entered by my partner and updated as appropriate by me 01/08/23.     Sepsis due to UTI  Bilateral epididymal orchitis   -WBC 47k on presentation, down to 14.46 this AM  -Urine culture grew pseudomonas.  -ID following, Zosyn changed to PO Levaquin   -Urology following   -s/p US scrotum/testicles 1/6, no abscess   -AM labs      Diarrhea  -Likely secondary to ABX  -Pt afebrile   -continue probiotic      DM2  -A1c 7.0%  -continue SSI  -Continue 5 units Levemir daily      ÁNGEL, mild  -Resolved with IV fluids     Hypothyroidism  -Continue synthroid     Hypokalemia, mild  -Replace per protocol      Expected Discharge Location and Transportation: Home when ok with Urology  Expected Discharge 1/8/23       Expected Discharge Date and Time      Expected Discharge Date Expected Discharge Time     Jan 8, 2023            DVT prophylaxis:  Medical DVT prophylaxis orders are present.     AM-PAC 6 Clicks Score (PT): 19 (01/07/23 2000)    CODE STATUS:   Code Status and Medical Interventions:   Ordered at: 01/01/23 1801     Code Status (Patient has no pulse and is not breathing):    CPR (Attempt to Resuscitate)     Medical Interventions (Patient has pulse or is breathing):    Full Support       Andres Singleton, ROCIO  01/08/23

## 2023-01-08 NOTE — PLAN OF CARE
Goal Outcome Evaluation:  Plan of Care Reviewed With: patient        Progress: improving  Outcome Evaluation: patient ambulated 900 feet with CGA x1 and rolling walker for support, verbal cues for upright posture. Ambulates with wide JAKUB do to scrotal edema. Increased c/o scrotal pain after ambulation in hallway. Nursing notified and aware.

## 2023-01-08 NOTE — PLAN OF CARE
Goal Outcome Evaluation:  Plan of Care Reviewed With: patient        Progress: improving  Outcome Evaluation: VSS. RA. PRN adminsitered for pain per request this morning. Pt voiding. PVR ~75ml. Incontinent. Worked with therapy. Warm compresses applied. Sling in place.

## 2023-01-08 NOTE — PROGRESS NOTES
[facility]   Urology Progress Note    Patient Name: Cj Cifuentes  : 1950  MRN: [id number]  Primary Care Physician:  Gary Kaur MD  Date of admission: 2023    Subjective   Subjective     Chief Complaint: Lower urinary tract symptoms, epididymoorchitis    HPI:  Afebrile and hemodynamically stable overnight.  Patient reports voiding without difficulty.  Denies dysuria.  Continues to have scrotal erythema and edema, left greater than right.  Tender to palpation.  Reports episode of diarrhea overnight.    Review of Systems   The following systems were reviewed and negative;  constitution, respiratory, cardiovascular, gastrointestinal, genitourinary, integument, hematologic / lymphatic, musculoskeletal, neurological and behavioral/psych.    Objective   Objective     Vitals:   Temp:  [97.8 °F (36.6 °C)-98.4 °F (36.9 °C)] 98.4 °F (36.9 °C)  Heart Rate:  [] 89  Resp:  [16-18] 18  BP: (129-138)/(81-89) 138/83  Flow (L/min):  [2] 2  Physical Exam    Constitutional: Awake in bed, alert   Eyes: PERRLA, sclerae anicteric, no conjunctival injection   HENT: Normocephalic, atraumatic, mucous membranes moist   Neck: Supple, trachea midline   Respiratory: Equal chest rise, non-labored respirations    Cardiovascular: perfused, no edema   Gastrointestinal: Soft, nontender, non-distended   Genitourinary: normal phallus, orthotopic meatus, bilaterally scrotal edema, minimal erythema left > right.  Tender to palpation.   Musculoskeletal: No lower extremity edema bilaterally, no clubbing or cyanosis to extremities   Psychiatric: Appropriate affect, cooperative   Neurologic: Oriented x 3,  Cranial Nerves grossly intact, speech clear   Skin: No rashes     Result Review    Result Review:  I have personally reviewed the results from the time of this admission to 2023 11:15 EST and agree with these findings:  [x]  Laboratory  [x]  Microbiology  []  Radiology  []  EKG/Telemetry   []  Cardiology/Vascular    []  Pathology  [x]  Old records  []  Other:    Most notable findings include:   WBC 14.4    Assessment & Plan   Assessment / Plan     Brief Patient Summary:  Cj Cifuentes is a 72 y.o. male who who is status post robotic assisted laparoscopic simple prostatectomy for lower urinary tract symptoms.  The patient has had readmission for epididymoorchitis.  He continues to void without difficulty.  Infectious disease consulted on 1/6/2023 for recommendations on antibiotic therapy.  He has had continued elevation of WBC during admission.    Over the past 24 to 48 hours WBC has been appropriately decreasing.    Active Hospital Problems:  Active Hospital Problems    Diagnosis    • **Sepsis, due to unspecified organism, unspecified whether acute organ dysfunction present (HCC)    • Essential hypertension    • Type 2 diabetes mellitus with hyperglycemia, without long-term current use of insulin ([facility])    • Obesity (BMI 30-39.9)    • Hernia, abdominal    • ÁNGEL (acute kidney injury) ([facility])      Continue supportive care for scrotal edema.  Appreciate ID recommendations for antibiotic therapy.  Continue to monitor appropriate voiding and bladder emptying.  If continued stability in WBC patient would be appropriate for discharge 1/9/2023 from a urologic standpoint.  This will need to be coordinated with antibiotic plan from infectious disease, coordination with PT and case management for appropriate disposition.        DVT prophylaxis:  Medical DVT prophylaxis orders are present.    CODE STATUS:   Code Status (Patient has no pulse and is not breathing): CPR (Attempt to Resuscitate)  Medical Interventions (Patient has pulse or is breathing): Full Support    Disposition: Per primary team, likely within the next 24 hours pending disposition    Electronically signed by Artis Jolly MD, 01/08/23, 11:15 AM EST.
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
## 2023-01-08 NOTE — PLAN OF CARE
Goal Outcome Evaluation:  Plan of Care Reviewed With: patient        Progress: improving   A/Ox4, VSS, currently room air to 2LNC, and NSR with ST depression on there monitor. Pt edema/redness slightly improved this shift. New IV this shift. Multiple incontinence episodes. Currently resting in bed appears asleep.

## 2023-01-09 ENCOUNTER — READMISSION MANAGEMENT (OUTPATIENT)
Dept: CALL CENTER | Facility: HOSPITAL | Age: 73
End: 2023-01-09
Payer: MEDICARE

## 2023-01-09 ENCOUNTER — TELEPHONE (OUTPATIENT)
Dept: PAIN MEDICINE | Facility: CLINIC | Age: 73
End: 2023-01-09
Payer: MEDICARE

## 2023-01-09 VITALS
RESPIRATION RATE: 18 BRPM | HEART RATE: 88 BPM | SYSTOLIC BLOOD PRESSURE: 139 MMHG | TEMPERATURE: 97.7 F | WEIGHT: 246.3 LBS | BODY MASS INDEX: 36.48 KG/M2 | OXYGEN SATURATION: 95 % | HEIGHT: 69 IN | DIASTOLIC BLOOD PRESSURE: 80 MMHG

## 2023-01-09 LAB
ANION GAP SERPL CALCULATED.3IONS-SCNC: 14 MMOL/L (ref 5–15)
BASOPHILS # BLD AUTO: 0.08 10*3/MM3 (ref 0–0.2)
BASOPHILS NFR BLD AUTO: 0.5 % (ref 0–1.5)
BUN SERPL-MCNC: 14 MG/DL (ref 8–23)
BUN/CREAT SERPL: 20.3 (ref 7–25)
CALCIUM SPEC-SCNC: 8.9 MG/DL (ref 8.6–10.5)
CHLORIDE SERPL-SCNC: 98 MMOL/L (ref 98–107)
CO2 SERPL-SCNC: 23 MMOL/L (ref 22–29)
CREAT SERPL-MCNC: 0.69 MG/DL (ref 0.76–1.27)
DEPRECATED RDW RBC AUTO: 43.9 FL (ref 37–54)
EGFRCR SERPLBLD CKD-EPI 2021: 99.6 ML/MIN/1.73
EOSINOPHIL # BLD AUTO: 0.29 10*3/MM3 (ref 0–0.4)
EOSINOPHIL NFR BLD AUTO: 2 % (ref 0.3–6.2)
ERYTHROCYTE [DISTWIDTH] IN BLOOD BY AUTOMATED COUNT: 13.7 % (ref 12.3–15.4)
GLUCOSE BLDC GLUCOMTR-MCNC: 195 MG/DL (ref 70–130)
GLUCOSE BLDC GLUCOMTR-MCNC: 204 MG/DL (ref 70–130)
GLUCOSE SERPL-MCNC: 278 MG/DL (ref 65–99)
HCT VFR BLD AUTO: 35.9 % (ref 37.5–51)
HGB BLD-MCNC: 11.7 G/DL (ref 13–17.7)
IMM GRANULOCYTES # BLD AUTO: 0.24 10*3/MM3 (ref 0–0.05)
IMM GRANULOCYTES NFR BLD AUTO: 1.6 % (ref 0–0.5)
LYMPHOCYTES # BLD AUTO: 1.62 10*3/MM3 (ref 0.7–3.1)
LYMPHOCYTES NFR BLD AUTO: 10.9 % (ref 19.6–45.3)
MAGNESIUM SERPL-MCNC: 2 MG/DL (ref 1.6–2.4)
MCH RBC QN AUTO: 28.6 PG (ref 26.6–33)
MCHC RBC AUTO-ENTMCNC: 32.6 G/DL (ref 31.5–35.7)
MCV RBC AUTO: 87.8 FL (ref 79–97)
MONOCYTES # BLD AUTO: 0.77 10*3/MM3 (ref 0.1–0.9)
MONOCYTES NFR BLD AUTO: 5.2 % (ref 5–12)
NEUTROPHILS NFR BLD AUTO: 11.8 10*3/MM3 (ref 1.7–7)
NEUTROPHILS NFR BLD AUTO: 79.8 % (ref 42.7–76)
NRBC BLD AUTO-RTO: 0 /100 WBC (ref 0–0.2)
PLATELET # BLD AUTO: 486 10*3/MM3 (ref 140–450)
PMV BLD AUTO: 9 FL (ref 6–12)
POTASSIUM SERPL-SCNC: 4.4 MMOL/L (ref 3.5–5.2)
RBC # BLD AUTO: 4.09 10*6/MM3 (ref 4.14–5.8)
SODIUM SERPL-SCNC: 135 MMOL/L (ref 136–145)
WBC NRBC COR # BLD: 14.8 10*3/MM3 (ref 3.4–10.8)

## 2023-01-09 PROCEDURE — 82962 GLUCOSE BLOOD TEST: CPT

## 2023-01-09 PROCEDURE — 99232 SBSQ HOSP IP/OBS MODERATE 35: CPT | Performed by: UROLOGY

## 2023-01-09 PROCEDURE — 63710000001 INSULIN DETEMIR PER 5 UNITS: Performed by: FAMILY MEDICINE

## 2023-01-09 PROCEDURE — 63710000001 INSULIN LISPRO (HUMAN) PER 5 UNITS: Performed by: NURSE PRACTITIONER

## 2023-01-09 PROCEDURE — 99239 HOSP IP/OBS DSCHRG MGMT >30: CPT | Performed by: NURSE PRACTITIONER

## 2023-01-09 PROCEDURE — 85025 COMPLETE CBC W/AUTO DIFF WBC: CPT | Performed by: NURSE PRACTITIONER

## 2023-01-09 PROCEDURE — 25010000002 HEPARIN (PORCINE) PER 1000 UNITS: Performed by: NURSE PRACTITIONER

## 2023-01-09 PROCEDURE — 80048 BASIC METABOLIC PNL TOTAL CA: CPT | Performed by: NURSE PRACTITIONER

## 2023-01-09 PROCEDURE — 83735 ASSAY OF MAGNESIUM: CPT | Performed by: NURSE PRACTITIONER

## 2023-01-09 RX ORDER — LEVOFLOXACIN 750 MG/1
750 TABLET ORAL DAILY
Qty: 6 TABLET | Refills: 0 | Status: SHIPPED | OUTPATIENT
Start: 2023-01-10 | End: 2023-01-16

## 2023-01-09 RX ORDER — SACCHAROMYCES BOULARDII 250 MG
250 CAPSULE ORAL 2 TIMES DAILY
Qty: 14 CAPSULE | Refills: 0 | Status: SHIPPED | OUTPATIENT
Start: 2023-01-09 | End: 2023-01-16

## 2023-01-09 RX ORDER — OXYCODONE HYDROCHLORIDE AND ACETAMINOPHEN 5; 325 MG/1; MG/1
1 TABLET ORAL EVERY 6 HOURS PRN
Qty: 8 TABLET | Refills: 0 | Status: SHIPPED | OUTPATIENT
Start: 2023-01-09 | End: 2023-03-13

## 2023-01-09 RX ADMIN — ACETAMINOPHEN 1000 MG: 500 TABLET, FILM COATED ORAL at 12:31

## 2023-01-09 RX ADMIN — INSULIN LISPRO 2 UNITS: 100 INJECTION, SOLUTION INTRAVENOUS; SUBCUTANEOUS at 09:46

## 2023-01-09 RX ADMIN — LEVOTHYROXINE SODIUM 50 MCG: 50 TABLET ORAL at 05:20

## 2023-01-09 RX ADMIN — ACETAMINOPHEN 1000 MG: 500 TABLET, FILM COATED ORAL at 05:20

## 2023-01-09 RX ADMIN — HEPARIN SODIUM 5000 UNITS: 5000 INJECTION INTRAVENOUS; SUBCUTANEOUS at 05:20

## 2023-01-09 RX ADMIN — INSULIN DETEMIR 5 UNITS: 100 INJECTION, SOLUTION SUBCUTANEOUS at 09:46

## 2023-01-09 RX ADMIN — Medication 250 MG: at 09:46

## 2023-01-09 RX ADMIN — INSULIN LISPRO 3 UNITS: 100 INJECTION, SOLUTION INTRAVENOUS; SUBCUTANEOUS at 12:30

## 2023-01-09 RX ADMIN — LEVOFLOXACIN 750 MG: 750 TABLET, FILM COATED ORAL at 09:46

## 2023-01-09 NOTE — PROGRESS NOTES
Enter Query Response Below      Query Response:     ÁNGEL related to sepsis   Electronically signed by ROCIO Mai, 23, 2:44 PM EST.           If applicable, please update the problem list.        Patient: Cj Cifuentes        : 1950  Account: [id number]           Admit Date:         How to Respond to this query:       a. Click New Note     b. Answer query within the yellow box.                c. Update the Problem List, if applicable.      If you have any questions about this query contact me at: [email or web address]    Dear Ms. Munoz:    72 year old male admitted with Sepsis due to UTI  Clinical indicators: serum cr 0.69 (), 0.65 (), 0.62 (), 0.56 (1/3), 1.09 ()   Discharge summary: ÁNGEL, mild-Resolved with IV fluids  Treatments: iv normal saline,  iv and po levaquin, iv zosyn    After study, please further specify:    ÁNGEL due to Sepsis  ÁNGEL not related to Sepsis  Other________________  Unable to determine    By submitting this query, we are merely seeking further clarification of documentation to accurately reflect all conditions that you are monitoring, evaluating, treating or that extend the hospitalization or utilize additional resources of care. Please utilize your independent clinical judgment when addressing the question(s) above.     This query and your response, once completed, will be entered into the legal medical record.    Sincerely,  Jaqui Ingram RN  Clinical Documentation Integrity Program

## 2023-01-09 NOTE — CASE MANAGEMENT/SOCIAL WORK
Continued Stay Note  [facility]     Patient Name: Cj Cifuentes  MRN: [id number]  Today's Date: 1/9/2023    Admit Date: 1/1/2023    Plan: Home at discharge   Discharge Plan     Row Name 01/09/23 1621       Plan    Plan Home at discharge    Final Discharge Disposition Code 01 - home or self-care    Final Note Patient has discharge orders for today. He will dc home on oral antibiotics. Follow up with Dr Scales has been made and is in the AVS. Patient was able to ambulate independently 900 ft with PT during yesterdays session. - He will discharge home with his spouse -chase [phone]               Discharge Codes    No documentation.               Expected Discharge Date and Time     Expected Discharge Date Expected Discharge Time    Jan 9, 2023             Chase Jarquin RN

## 2023-01-09 NOTE — TELEPHONE ENCOUNTER
lvm for patient and wife, in light of his current health emergency his appointment has been cancelled.   He cannot be rescheduled until [employer] APPROVAL is in chart.    Pt was instructed to reach out once he is feeling better and to call us with any questions or concerns.

## 2023-01-09 NOTE — PROGRESS NOTES
[facility]   Urology Progress Note    Patient Name: Cj Cifuentes  : 1950  MRN: [id number]  Date of admission: 2023  Surgical Procedures Since Admission:    Subjective   Subjective     Chief Complaint: Left epididymoorchitis    History of Present Illness   Pt doing very well.  No complaints.  Reports pain has improved.  Swelling improved.  No dysuria.  Reports he is urinating well.      Review of Systems   Constitutional: Negative for chills, fatigue, fever and unexpected weight change.   HENT: Negative for congestion, rhinorrhea and sore throat.    Eyes: Negative for visual disturbance.   Respiratory: Negative for apnea, cough, chest tightness and shortness of breath.    Cardiovascular: Negative for chest pain.   Gastrointestinal: Negative for abdominal pain, constipation, diarrhea, nausea and vomiting.   Endocrine: Negative for polydipsia and polyuria.   Genitourinary: Positive for scrotal swelling. Negative for difficulty urinating, dysuria, enuresis, flank pain, frequency, genital sores, hematuria and urgency.   Musculoskeletal: Negative for gait problem.   Skin: Negative for rash and wound.   Allergic/Immunologic: Negative for immunocompromised state.   Neurological: Negative for dizziness, tremors, syncope, weakness and light-headedness.   Hematological: Does not bruise/bleed easily.       Objective   Objective     Vitals:   Temp:  [97.9 °F (36.6 °C)-98.2 °F (36.8 °C)] 98.2 °F (36.8 °C)  Heart Rate:  [] 84  Resp:  [16-18] 16  BP: (121-137)/(78-84) 137/82  Flow (L/min):  [2] 2    Physical Exam  Vitals and nursing note reviewed.   Constitutional:       General: He is awake. He is not in acute distress.     Appearance: Normal appearance. He is well-developed. He is obese.   HENT:      Head: Normocephalic and atraumatic.      Right Ear: External ear normal.      Left Ear: External ear normal.      Nose: Nose normal.   Eyes:      Conjunctiva/sclera: Conjunctivae normal.   Pulmonary:       Effort: Pulmonary effort is normal.   Abdominal:      General: There is no distension.      Palpations: Abdomen is soft. There is no mass.      Tenderness: There is no abdominal tenderness. There is no right CVA tenderness, left CVA tenderness, guarding or rebound.      Hernia: No hernia is present. There is no hernia in the left inguinal area or right inguinal area.   Genitourinary:     Pubic Area: No rash.       Penis: Normal.       Testes: Normal.      Rectum: No mass or tenderness. Normal anal tone.      Comments: Scortal edema.  Mild tenderness over the left hemiscrotum.  Erythema improving.   Musculoskeletal:      Cervical back: Normal range of motion.   Lymphadenopathy:      Lower Body: No right inguinal adenopathy. No left inguinal adenopathy.   Skin:     General: Skin is warm.   Neurological:      General: No focal deficit present.      Mental Status: He is alert and oriented to person, place, and time.   Psychiatric:         Behavior: Behavior normal. Behavior is cooperative.               Result Review    Result Review:  I have personally reviewed the results from the time of this admission to 1/9/2023 07:30 EST and agree with these findings:  []  Laboratory  []  Microbiology  []  Radiology  []  EKG/Telemetry   []  Cardiology/Vascular   []  Pathology  []  Old records  []  Other:  Most notable findings include: WBC pending    Assessment & Plan   Assessment / Plan     Brief Patient Summary:  Cj Cifuentes is a 72 y.o. male who underwent RASP and subsequently developed epididymoorchitis.  He has improved significantly.      Active Hospital Problems:  Active Hospital Problems    Diagnosis    • **Sepsis, due to unspecified organism, unspecified whether acute organ dysfunction present ([facility])    • Essential hypertension    • Type 2 diabetes mellitus with hyperglycemia, without long-term current use of insulin ([facility])    • Obesity (BMI 30-39.9)    • Hernia, abdominal    • ÁNGEL (acute kidney injury) ([facility])       Plan:   1.  WBC pending this morning but patient cleared for discharge from urologic standpoint as long as WBC continues to trend down.   2.  Will defer to ID for duration of antibiotics  3.  Continue scrotal support.  Can use warm compresses and ice.   4.  Please have patient follow up within 2 weeks.
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
## 2023-01-09 NOTE — PROGRESS NOTES
Enter Query Response Below      Query Response:     UTI due to carbone catheter   Electronically signed by ROCIO Mai, 23, 2:45 PM EST.           If applicable, please update the problem list.        Patient: Cj Cifuentes        : 1950  Account: [id number]           Admit Date:         How to Respond to this query:       a. Click New Note     b. Answer query within the yellow box.                c. Update the Problem List, if applicable.      If you have any questions about this query contact me at: [email or web address]    Dear Ms. Munoz:    72 year old male admitted with Sepsis due to UTI  Clinical indicators: 22 Patient underwent robotic simple prostatectomy. He was discharged home with cephalexin for UTI coverage while catheter remained in place.   Urine culture sent are positive for Pseudomonas aeruginosa.  Patient patient underwent   catheter exchange.  H&P: Sepsis of urinary origin/postop prostatectomy  Treatments:  iv and po levaquin, iv zosyn    After study, please further specify:    UTI due to carbone catheter  UTI not related to carbone catheter  Other___________________  Unable to determine      By submitting this query, we are merely seeking further clarification of documentation to accurately reflect all conditions that you are monitoring, evaluating, treating or that extend the hospitalization or utilize additional resources of care. Please utilize your independent clinical judgment when addressing the question(s) above.     This query and your response, once completed, will be entered into the legal medical record.    Sincerely,  Jaqui Ingram RN  Clinical Documentation Integrity Program

## 2023-01-09 NOTE — PLAN OF CARE
Goal Outcome Evaluation:  Plan of Care Reviewed With: patient        Progress: improving  Outcome Evaluation: VSS.RA. No complaints of pain this morning. Voiding. Warm compress applied. D/C orders palced.

## 2023-01-10 ENCOUNTER — TRANSITIONAL CARE MANAGEMENT TELEPHONE ENCOUNTER (OUTPATIENT)
Dept: CALL CENTER | Facility: HOSPITAL | Age: 73
End: 2023-01-10
Payer: MEDICARE

## 2023-01-10 DIAGNOSIS — E11.65 TYPE 2 DIABETES MELLITUS WITH HYPERGLYCEMIA, WITHOUT LONG-TERM CURRENT USE OF INSULIN: ICD-10-CM

## 2023-01-10 NOTE — OUTREACH NOTE
Call Center TCM Note    Flowsheet Row Responses   [facility] patient discharged from? [place]   Does the patient have one of the following disease processes/diagnoses(primary or secondary)? Sepsis   TCM attempt successful? No   Unsuccessful attempts Attempt 2          Connie Steinberg RN    1/10/2023, 13:53 EST

## 2023-01-10 NOTE — OUTREACH NOTE
Prep Survey    Flowsheet Row Responses   [facility] patient discharged from? [place]   Is LACE score < 7 ? No   Eligibility [facility]   Date of Admission 01/01/23   Date of Discharge 01/09/23   Discharge Disposition Home or Self Care   Discharge diagnosis sepsis d/t UTI, bilateral epididymal orchitis, ÁNGEL, T2DM   Does the patient have one of the following disease processes/diagnoses(primary or secondary)? Sepsis   Does the patient have Home health ordered? No   Is there a DME ordered? No   Prep survey completed? Yes          SHIV Leyva Registered Nurse

## 2023-01-10 NOTE — OUTREACH NOTE
Call Center TCM Note    Flowsheet Row Responses   [facility] facility patient discharged from? [place]   Does the patient have one of the following disease processes/diagnoses(primary or secondary)? Sepsis   TCM attempt successful? No  [Kassidy-spouse ]   Unsuccessful attempts Attempt 1          Connie Steinberg RN    1/10/2023, 12:57 EST

## 2023-01-11 ENCOUNTER — TRANSITIONAL CARE MANAGEMENT TELEPHONE ENCOUNTER (OUTPATIENT)
Dept: CALL CENTER | Facility: HOSPITAL | Age: 73
End: 2023-01-11
Payer: MEDICARE

## 2023-01-11 RX ORDER — PIOGLITAZONEHYDROCHLORIDE 15 MG/1
TABLET ORAL
Qty: 30 TABLET | Refills: 0 | OUTPATIENT
Start: 2023-01-11

## 2023-01-11 NOTE — OUTREACH NOTE
Call Center TCM Note    Flowsheet Row Responses   [facility] patient discharged from? [place]   Does the patient have one of the following disease processes/diagnoses(primary or secondary)? Sepsis   TCM attempt successful? Yes   Call start time 1447   Call end time 1449   Discharge diagnosis sepsis d/t UTI, bilateral epididymal orchitis, ÁNGEL, T2DM   Meds reviewed with patient/caregiver? Yes   Is the patient having any side effects they believe may be caused by any medication additions or changes? No   Does the patient have all medications related to this admission filled (includes all antibiotics, inhalers, nebulizers,steroids,etc.) Yes   Is the patient taking all medications as directed (includes completed medication regime)? Yes   Does the patient have an appointment with their PCP within 7 days of discharge? --  [OV appt. with PCP 1/13/23 @ 10:45am. Urology appt. 1/23/23 @ 10am.]   Has home health visited the patient within 72 hours of discharge? N/A   Psychosocial issues? No   Did the patient receive a copy of their discharge instructions? Yes   Nursing interventions Reviewed instructions with patient   What is the patient's perception of their health status since discharge? Improving   Nursing interventions Nurse provided patient education   Is the patient/caregiver able to teach back TIME? T emperature - higher or lower than normal   Nursing interventions Nurse provided patient education   Is patient/caregiver able to teach back steps to recovery at home? Eat a balanced diet, Rest and regain strength, Exercise as tolerated   Is the patient/caregiver able to teach back signs and symptoms of worsening condition: Fever, Rapid heart rate (>90), Shortness of breath/rapid respiratory rate   Is the patient/caregiver able to teach back the hierarchy of who to call/visit for symptoms/problems? PCP, Specialist, Home health nurse, Urgent Care, ED, 911 Yes   TCM call completed? Yes   Call end time 1449   Would  this patient benefit from a Referral to [facility] Social Work? No   Is the patient interested in additional calls from an ambulatory ?  NOTE:  applies to high risk patients requiring additional follow-up. No          Elizabeth Alfaro RN    1/11/2023, 14:51 EST

## 2023-01-13 ENCOUNTER — OFFICE VISIT (OUTPATIENT)
Dept: FAMILY MEDICINE CLINIC | Facility: CLINIC | Age: 73
End: 2023-01-13
Payer: MEDICARE

## 2023-01-13 VITALS
TEMPERATURE: 97.8 F | WEIGHT: 229 LBS | BODY MASS INDEX: 33.92 KG/M2 | HEIGHT: 69 IN | SYSTOLIC BLOOD PRESSURE: 128 MMHG | DIASTOLIC BLOOD PRESSURE: 80 MMHG | RESPIRATION RATE: 20 BRPM | HEART RATE: 113 BPM | OXYGEN SATURATION: 94 %

## 2023-01-13 DIAGNOSIS — N30.00 ACUTE CYSTITIS WITHOUT HEMATURIA: ICD-10-CM

## 2023-01-13 DIAGNOSIS — E11.65 TYPE 2 DIABETES MELLITUS WITH HYPERGLYCEMIA, WITHOUT LONG-TERM CURRENT USE OF INSULIN: Primary | ICD-10-CM

## 2023-01-13 DIAGNOSIS — Z09 HOSPITAL DISCHARGE FOLLOW-UP: ICD-10-CM

## 2023-01-13 LAB
BASOPHILS # BLD AUTO: 0.09 10*3/MM3 (ref 0–0.2)
BASOPHILS NFR BLD AUTO: 0.4 % (ref 0–1.5)
BUN SERPL-MCNC: 15 MG/DL (ref 8–23)
BUN/CREAT SERPL: 16.3 (ref 7–25)
CALCIUM SERPL-MCNC: 10.4 MG/DL (ref 8.6–10.5)
CHLORIDE SERPL-SCNC: 97 MMOL/L (ref 98–107)
CO2 SERPL-SCNC: 19.8 MMOL/L (ref 22–29)
CREAT SERPL-MCNC: 0.92 MG/DL (ref 0.76–1.27)
EGFRCR SERPLBLD CKD-EPI 2021: 89.5 ML/MIN/1.73
EOSINOPHIL # BLD AUTO: 0.19 10*3/MM3 (ref 0–0.4)
EOSINOPHIL NFR BLD AUTO: 0.9 % (ref 0.3–6.2)
ERYTHROCYTE [DISTWIDTH] IN BLOOD BY AUTOMATED COUNT: 13.1 % (ref 12.3–15.4)
GLUCOSE SERPL-MCNC: 135 MG/DL (ref 65–99)
HCT VFR BLD AUTO: 40.1 % (ref 37.5–51)
HGB BLD-MCNC: 13.5 G/DL (ref 13–17.7)
IMM GRANULOCYTES # BLD AUTO: 0.18 10*3/MM3 (ref 0–0.05)
IMM GRANULOCYTES NFR BLD AUTO: 0.9 % (ref 0–0.5)
LYMPHOCYTES # BLD AUTO: 1.75 10*3/MM3 (ref 0.7–3.1)
LYMPHOCYTES NFR BLD AUTO: 8.4 % (ref 19.6–45.3)
MCH RBC QN AUTO: 28.7 PG (ref 26.6–33)
MCHC RBC AUTO-ENTMCNC: 33.7 G/DL (ref 31.5–35.7)
MCV RBC AUTO: 85.1 FL (ref 79–97)
MONOCYTES # BLD AUTO: 1.11 10*3/MM3 (ref 0.1–0.9)
MONOCYTES NFR BLD AUTO: 5.4 % (ref 5–12)
NEUTROPHILS # BLD AUTO: 17.41 10*3/MM3 (ref 1.7–7)
NEUTROPHILS NFR BLD AUTO: 84 % (ref 42.7–76)
NRBC BLD AUTO-RTO: 0 /100 WBC (ref 0–0.2)
PLATELET # BLD AUTO: 536 10*3/MM3 (ref 140–450)
POTASSIUM SERPL-SCNC: 5 MMOL/L (ref 3.5–5.2)
RBC # BLD AUTO: 4.71 10*6/MM3 (ref 4.14–5.8)
SODIUM SERPL-SCNC: 137 MMOL/L (ref 136–145)
WBC # BLD AUTO: 20.73 10*3/MM3 (ref 3.4–10.8)

## 2023-01-13 PROCEDURE — 99495 TRANSJ CARE MGMT MOD F2F 14D: CPT | Performed by: FAMILY MEDICINE

## 2023-01-13 PROCEDURE — 1111F DSCHRG MED/CURRENT MED MERGE: CPT | Performed by: FAMILY MEDICINE

## 2023-01-13 NOTE — PROGRESS NOTES
Transitional Care Follow Up Visit  Subjective     Cj Cifuentes is a 72 y.o. male who presents for a transitional care management visit.    Within 48 business hours after discharge our office contacted him via telephone to coordinate his care and needs.      I reviewed and discussed the details of that call along with the discharge summary, hospital problems, inpatient lab results, inpatient diagnostic studies, and consultation reports with Cj.     Current outpatient and discharge medications have been reconciled for the patient.  Reviewed by: Gary Kaur MD      Date of TCM Phone Call 1/9/2023   [facility]   Date of Admission 1/1/2023   Date of Discharge 1/9/2023   Discharge Disposition Home or Self Care     Risk for Readmission (LACE) Score: 10 (1/9/2023  6:00 AM)      History of Present Illness   Course During Hospital Stay: Patient was admitted with a diagnosis of sepsis on  January 1.  Source of infection was urinary tract infection.  Patient was hospitalized until January 9.  Patient had undergone robotic assisted prostatectomy on December 23.   Pseudomonas grew from the urine culture.  Patient was transitioned from IV Zosyn to oral Levaquin which she continues to take.  He has follow-up with infectious disease on January 18.  Patient has follow-up with urology in approximately 2 weeks.    Patient also has diabetes.  He was given supplemental insulin while hospitalized but reports since returning home he is frequently having to eat to address hypoglycemia.  This is occurring on a daily basis.  His continuous glucose monitor shows blood sugars dropping down to 70 or below on a daily basis.  The blood sugars do respond to eating or drinking.  Patient became hypoglycemic in the office and was given crackers which caused his blood sugar to rise to 150.    Patient reports profound weakness related to his hospitalization.  Prior to surgery patient had undergone robotic prostatectomy.  Patient  also reports that he required resuscitation during his surgery.      The following portions of the patient's history were reviewed and updated as appropriate: allergies, current medications, past family history, past medical history, past social history, past surgical history and problem list.    Review of Systems    Objective   Physical Exam  Constitutional:       Appearance: He is ill-appearing.   Cardiovascular:      Rate and Rhythm: Regular rhythm. Tachycardia present.      Pulses: Normal pulses.      Heart sounds: Normal heart sounds.   Pulmonary:      Effort: Pulmonary effort is normal.      Breath sounds: Normal breath sounds.   Abdominal:      General: Bowel sounds are normal.      Palpations: Abdomen is soft.   Skin:     General: Skin is warm.   Neurological:      Mental Status: He is alert.         Assessment & Plan   Diagnoses and all orders for this visit:    1. Type 2 diabetes mellitus with hyperglycemia, without long-term current use of insulin (HCC) (Primary)  Assessment & Plan:  If he is experiencing side effects from treatment we will reduce his metformin to once daily.  I believe this will be a temporary reduction with goal of avoiding further hypoglycemia.  Repeat BMP and CBC ordered today to assess glucose, renal function, white blood cell count    Orders:  -     Basic Metabolic Panel    2. Hospital discharge follow-up    3. Acute cystitis without hematuria  Comments:   follow-up with infectious disease and urology as scheduled  Orders:  -     CBC Auto Differential    Other orders  -     metFORMIN (Glucophage) 500 MG tablet; Take 1 tablet by mouth Daily With Breakfast for 30 days.  Dispense: 60 tablet; Refill: 3    In addition to the above I will reach out to urology regarding the patient's claim of his resuscitation during his procedure as I was unable to find any record of this

## 2023-01-13 NOTE — ASSESSMENT & PLAN NOTE
If he is experiencing side effects from treatment we will reduce his metformin to once daily.  I believe this will be a temporary reduction with goal of avoiding further hypoglycemia.  Repeat BMP and CBC ordered today to assess glucose, renal function, white blood cell count

## 2023-01-23 ENCOUNTER — OFFICE VISIT (OUTPATIENT)
Dept: UROLOGY | Facility: CLINIC | Age: 73
End: 2023-01-23
Payer: MEDICARE

## 2023-01-23 VITALS — BODY MASS INDEX: 33.92 KG/M2 | WEIGHT: 229 LBS | HEIGHT: 69 IN

## 2023-01-23 DIAGNOSIS — N45.2 ORCHITIS: Primary | ICD-10-CM

## 2023-01-23 LAB
BILIRUB BLD-MCNC: NEGATIVE MG/DL
CLARITY, POC: CLEAR
COLOR UR: YELLOW
EXPIRATION DATE: ABNORMAL
GLUCOSE UR STRIP-MCNC: NEGATIVE MG/DL
KETONES UR QL: NEGATIVE
LEUKOCYTE EST, POC: ABNORMAL
Lab: ABNORMAL
NITRITE UR-MCNC: NEGATIVE MG/ML
PH UR: 6 [PH] (ref 5–8)
PROT UR STRIP-MCNC: ABNORMAL MG/DL
RBC # UR STRIP: ABNORMAL /UL
SP GR UR: 1.03 (ref 1–1.03)
UROBILINOGEN UR QL: NORMAL

## 2023-01-23 PROCEDURE — 81003 URINALYSIS AUTO W/O SCOPE: CPT | Performed by: UROLOGY

## 2023-01-23 PROCEDURE — 99024 POSTOP FOLLOW-UP VISIT: CPT | Performed by: UROLOGY

## 2023-01-23 PROCEDURE — 99214 OFFICE O/P EST MOD 30 MIN: CPT | Performed by: UROLOGY

## 2023-01-23 PROCEDURE — 87086 URINE CULTURE/COLONY COUNT: CPT | Performed by: UROLOGY

## 2023-01-23 PROCEDURE — 87077 CULTURE AEROBIC IDENTIFY: CPT | Performed by: UROLOGY

## 2023-01-23 RX ORDER — LEVOFLOXACIN 750 MG/1
TABLET ORAL
COMMUNITY
Start: 2023-01-18 | End: 2023-03-13
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
## 2023-01-25 ENCOUNTER — TELEPHONE (OUTPATIENT)
Dept: UROLOGY | Facility: CLINIC | Age: 73
End: 2023-01-25
Payer: MEDICARE

## 2023-01-25 DIAGNOSIS — B37.49 YEAST UTI: Primary | ICD-10-CM

## 2023-01-25 LAB
BACTERIA SPEC AEROBE CULT: ABNORMAL
BACTERIA SPEC AEROBE CULT: ABNORMAL

## 2023-01-25 RX ORDER — FLUCONAZOLE 100 MG/1
200 TABLET ORAL DAILY
Qty: 28 TABLET | Refills: 0 | Status: SHIPPED | OUTPATIENT
Start: 2023-01-25 | End: 2023-02-08

## 2023-01-25 NOTE — TELEPHONE ENCOUNTER
I called Mr. Cifuentes to discuss his urine culture result.  There was no answer.  Message was left.  He had yeast isolated.  I will send a prescription for fluconazole.

## 2023-01-25 NOTE — TELEPHONE ENCOUNTER
----- Message from Tuyet Stinson MD sent at 1/25/2023  9:11 AM EST -----  Please call patient and inform him of yeast in the urine.  Fluconazole sent to pharmacy.

## 2023-01-25 NOTE — TELEPHONE ENCOUNTER
I called the patient and got his voicemail, I left him a message telling him that he had medicine to  at the pharmacy due to his urine culture and to call me back if he had any further questions.

## 2023-02-06 ENCOUNTER — OFFICE VISIT (OUTPATIENT)
Dept: UROLOGY | Facility: CLINIC | Age: 73
End: 2023-02-06
Payer: MEDICARE

## 2023-02-06 VITALS
HEIGHT: 69 IN | BODY MASS INDEX: 33.92 KG/M2 | DIASTOLIC BLOOD PRESSURE: 82 MMHG | OXYGEN SATURATION: 98 % | WEIGHT: 229 LBS | HEART RATE: 77 BPM | SYSTOLIC BLOOD PRESSURE: 152 MMHG

## 2023-02-06 DIAGNOSIS — N45.2 ORCHITIS: Primary | ICD-10-CM

## 2023-02-06 DIAGNOSIS — N39.0 URINARY TRACT INFECTION WITHOUT HEMATURIA, SITE UNSPECIFIED: ICD-10-CM

## 2023-02-06 PROCEDURE — 87086 URINE CULTURE/COLONY COUNT: CPT | Performed by: NURSE PRACTITIONER

## 2023-02-06 PROCEDURE — 99213 OFFICE O/P EST LOW 20 MIN: CPT | Performed by: NURSE PRACTITIONER

## 2023-02-06 NOTE — PROGRESS NOTES
Follow Up Office Visit      Patient Name: Cj Cifuentes  : 1950   MRN: [id number]     Chief Complaint:    Chief Complaint   Patient presents with   • Orchitis       Referring Provider: No ref. provider found    History of Present Illness: Cj Cifuentes is a 72 y.o. male who presents today for follow up of left orchitis requiring hospital admission s/p robotic simple prostatectomy. He was last seen in our office two weeks ago and Urine culture +yeast. He recently completed prescription for Fluconazole. He reports he has another prescription for Fluconazole that he is about to start per Infectious Disease. He denies dysuria or gross hematuria. He reports his left testicle pain and swelling is improving. He is no longer taking Levaquin. He reports moderate urinary stream. He is wearing briefs due to urinary leakage with coughing, laughing. Otherwise, he feels he has decent bladder control. He is planning to see Infectious Disease on 23.    Subjective      Review of System: Review of Systems   Constitutional: Negative.    HENT: Negative.    Eyes: Negative.    Respiratory: Negative.    Cardiovascular: Negative.    Gastrointestinal: Positive for constipation.   Endocrine: Negative.    Genitourinary: Positive for testicular pain.        Left orchitis. Stress incontinence   Musculoskeletal: Negative.    Skin: Negative.    Allergic/Immunologic: Negative.    Neurological: Negative.    Hematological: Negative.    Psychiatric/Behavioral: Negative.       I have reviewed the ROS documented by my clinical staff, I have updated appropriately and I agree. ROCIO Fortune    I have reviewed and the following portions of the patient's history were updated as appropriate: past family history, past medical history, past social history, past surgical history and problem list.    Medications:     Current Outpatient Medications:   •  atorvastatin (Lipitor) 20 MG tablet, Take 1 tablet by mouth Every Night., Disp:  90 tablet, Rfl: 0  •  Continuous Blood Gluc  (FreeStyle Kera 2 [place]) device, , Disp: , Rfl:   •  Continuous Blood Gluc Sensor (FreeStyle Kera 2 Sensor) misc, 1 each Every 14 (Fourteen) Days., Disp: 2 each, Rfl: 11  •  cyclobenzaprine (FLEXERIL) 5 MG tablet, Take 1 tablet by mouth 3 (Three) Times a Day As Needed for Muscle Spasms (and severe pain)., Disp: 20 tablet, Rfl: 0  •  fluconazole (Diflucan) 100 MG tablet, Take 2 tablets by mouth Daily for 14 days., Disp: 28 tablet, Rfl: 0  •  glucose blood test strip, Use as instructed, Disp: 30 each, Rfl: 12  •  glucose monitoring kit (FREESTYLE) monitoring kit, 1 each Daily. E11.69, Disp: 1 each, Rfl: 0  •  Lancets (freestyle) lancets, E11.69, Disp: 30 each, Rfl: 12  •  levoFLOXacin (LEVAQUIN) 750 MG tablet, , Disp: , Rfl:   •  metFORMIN (Glucophage) 500 MG tablet, Take 1 tablet by mouth Daily With Breakfast for 30 days., Disp: 60 tablet, Rfl: 3  •  naproxen sodium (ALEVE) 220 MG tablet, Take 1 tablet by mouth 2 (Two) Times a Day As Needed for Headache or Mild Pain. OTC, Disp: , Rfl:   •  oxybutynin (DITROPAN) 5 MG tablet, Take 1 tablet by mouth Every 8 (Eight) Hours As Needed (Bladder spasm)., Disp: 20 tablet, Rfl: 0  •  oxyCODONE-acetaminophen (PERCOCET) 5-325 MG per tablet, Take 1 tablet by mouth Every 6 (Six) Hours As Needed for Severe Pain., Disp: 8 tablet, Rfl: 0  •  pregabalin (LYRICA) 75 MG capsule, Take 1 capsule by mouth 2 (Two) Times a Day. Take 1 tab PO daily x 1 week, BID thereafter Discontinue Gabapentin, Disp: 60 capsule, Rfl: 1  •  tamsulosin (FLOMAX) 0.4 MG capsule 24 hr capsule, Take 1 capsule by mouth Daily., Disp: 30 capsule, Rfl: 11  •  levothyroxine (Synthroid) 50 MCG tablet, Take 1 tablet by mouth Daily for 30 days. (Patient taking differently: Take 50 mcg by mouth Daily. NOT CURRENTLY TAKING), Disp: 30 tablet, Rfl: 3    Allergies:   No Known Allergies      Objective     Physical Exam:   Vital Signs:   Vitals:    02/06/23 1048   BP:  "152/82   Pulse: 77   SpO2: 98%   Weight: 104 kg (229 lb)   Height: 175.3 cm (69.02\")   PainSc: 0-No pain     Body mass index is 33.8 kg/m².     Physical Exam  Vitals and nursing note reviewed.   Constitutional:       Appearance: Normal appearance.   HENT:      Head: Normocephalic and atraumatic.      Nose: Nose normal.      Mouth/Throat:      Mouth: Mucous membranes are moist.   Eyes:      Pupils: Pupils are equal, round, and reactive to light.   Pulmonary:      Effort: Pulmonary effort is normal.   Abdominal:      General: Abdomen is flat.      Palpations: Abdomen is soft.   Genitourinary:     Comments: Left testicle with induration and erythema. Non tender to palpation on exam.  Musculoskeletal:         General: Normal range of motion.      Cervical back: Normal range of motion.   Skin:     General: Skin is warm and dry.      Capillary Refill: Capillary refill takes less than 2 seconds.   Neurological:      General: No focal deficit present.      Mental Status: He is alert.   Psychiatric:         Mood and Affect: Mood normal.         Labs:   Brief Urine Lab Results  (Last result in the past 365 days)      Color   Clarity   Blood   Leuk Est   Nitrite   Protein   CREAT   Urine HCG        01/23/23 1154 Yellow   Clear   Large   Large (3+)   Negative   3+                 Urine Culture    Urine Culture 12/19/22 1/1/23 1/23/23 1/23/23      1051 1051   Urine Culture >100,000 CFU/mL Mixed Maya Isolated >100,000 CFU/mL Pseudomonas aeruginosa (A) >100,000 CFU/mL Yeast isolated (A) <25,000 CFU/mL Enterococcus species (A)   (A) Abnormal value       Comments are available for some flowsheets but are not being displayed.              Lab Results   Component Value Date    GLUCOSE 135 (H) 01/13/2023    CALCIUM 10.4 01/13/2023     01/13/2023    K 5.0 01/13/2023    CO2 19.8 (L) 01/13/2023    CL 97 (L) 01/13/2023    BUN 15 01/13/2023    CREATININE 0.92 01/13/2023    BCR 16.3 01/13/2023    ANIONGAP 14.0 01/09/2023       Lab " Results   Component Value Date    WBC 20.73 (H) 01/13/2023    HGB 13.5 01/13/2023    HCT 40.1 01/13/2023    MCV 85.1 01/13/2023     (H) 01/13/2023       Images:   CT Abdomen Pelvis Without Contrast    Result Date: 10/16/2022  Impression: 1.  Mild bilateral urinary tract dilatation without obvious obstructing mass or stone. Nonobstructive right renal calculus. Mayes catheter in place. Consider possible infectious or inflammatory process. 2.  Prostatomegaly. 3.  Fat-containing umbilical hernia. 4.  Bladder is decompressed around a Mayes catheter. Electronically signed by:  Geovanna Lima D.O.  10/16/2022 4:13 AM Mountain Time    US Scrotum & Testicles    Result Date: 1/6/2023  Impression: Diffuse scrotal wall thickening with hyperemia in soft tissues lateral to both testicles, and hyperemia of right testicle and hyperemia of both epididymis, suggesting bilateral epididymo-orchitis. Moderate bilateral hydroceles complex on the left. Electronically Signed: Ryan Ybarra  1/6/2023 9:26 AM EST  Workstation ID: [id number]    US Scrotum & Testicles    Result Date: 1/1/2023  Mildly enlarged and hyperemic left epididymis concerning for epididymitis, with concurrent complex, septated and debris filled hydrocele on the left, concerning for component of infectious pyocele.  This report was finalized on 1/1/2023 3:07 PM by Naldo Silveira.      CT Abdomen Pelvis With Contrast    Result Date: 1/1/2023  Evaluation of the scrotum demonstrate similar findings to same-day ultrasound, with bilateral hydroceles present, appearing somewhat hyperdense on the left, better characterized on ultrasound and concerning for pyocele. The left epididymis also appears enlarged and there is a left-sided varicocele.  Some nonspecific edema is seen within the pelvis, likely relating to reported recent history of prostatectomy.  Incidentally noted 2.2 cm cystic finding of the pancreatic body. Consider nonemergent pancreatic protocol MRI when able.   "This report was finalized on 1/1/2023 4:09 PM by Naldo Silveira.      CT Cervical Spine With Intrathecal Contrast    Result Date: 11/22/2022  Moderate multilevel degenerative changes of cervical spine as described above.  This report was finalized on 11/22/2022 9:54 AM by Ryan Ybarra MD.      EMG & Nerve Conduction Test    Result Date: 11/14/2022  Sensorimotor neuropathy, axonal, mild Median neuropathy at the wrist on the left, mild-moderate Normal EMG of left arm and neck EMG of left leg suggests subacute L3/L4 radiculopathy This report is transcribed using the Dragon dictation system.     Invasive peripheral vascular study    Result Date: 11/21/2022   Inadequate contrast opacification of the cervical region for conventional myelographic images, but there is no myelographic \"block\" to passage of contrast into the cervical region and skull base.  There should be adequate contrast for the post-- myelogram CT scan, please refer to the CT cervical spine for further characterization.       Measures:   Tobacco:   Cj Cifuentes  reports that he quit smoking about 33 years ago. His smoking use included cigarettes. He has never been exposed to tobacco smoke. His smokeless tobacco use includes snuff..    Urine Incontinence: Patient reports that he is currently experiencing stress urinary incontinence.     Assessment / Plan      Assessment/Plan:   72 y.o. male who presented today for follow up of Left orchitis s/p simple prostatectomy. He reports his symptoms are improving. His pain and swelling is improving. We will send urine for culture given Urine culture with yeast two weeks ago. He will follow up with ID on 02/13/23 and we will see him back in our office in 1 month to assess symptoms. He is understanding and agreeable with plan. Will call with culture results.     Diagnoses and all orders for this visit:    1. Orchitis (Primary)    2. Urinary tract infection without hematuria, site unspecified  -     Urine " Culture - Urine, Urine, Clean Catch; Future       Follow Up:   Return for 4 weeks.    I spent approximately 25 minutes providing clinical care for this patient; including review of patient's chart and provider documentation, face to face time spent with patient in examination room (obtaining history, performing physical exam, discussing diagnosis and management options), placing orders, and completing patient documentation.     ROCIO Fortune  [facility] [place]

## 2023-02-07 ENCOUNTER — TELEPHONE (OUTPATIENT)
Dept: UROLOGY | Facility: CLINIC | Age: 73
End: 2023-02-07
Payer: MEDICARE

## 2023-02-07 LAB — BACTERIA SPEC AEROBE CULT: NO GROWTH

## 2023-02-07 NOTE — TELEPHONE ENCOUNTER
----- Message from ROCIO Fortune sent at 2/7/2023  9:23 AM EST -----  Regarding: Urine Culture  Hi,     Can you please let Mr. Peters know urine culture showed no growth?     Thank you,  Maria Eugenia Shine

## 2023-02-07 NOTE — TELEPHONE ENCOUNTER
I called the patient to let him know that his urine culture was negative for growth and no antibiotics were needed.  I did not get an answer but left a message on his voicemail and told him to call back if he had any questions or concerns.

## 2023-02-08 ENCOUNTER — TELEPHONE (OUTPATIENT)
Dept: UROLOGY | Facility: CLINIC | Age: 73
End: 2023-02-08
Payer: MEDICARE

## 2023-02-08 NOTE — TELEPHONE ENCOUNTER
Patient was schedule to go back to work yesterday, 02/07/23, but Dr. Stinson has pt on a 35 weight restriction.      Patient's employer does not allow pt to go back to work with the 35 weight restriction.      Patient asking if Dr. Stinson can take out the 35 lb restriction in order to go back to work.      Dr. Stinson, please advise.  Thank you.

## 2023-02-08 NOTE — TELEPHONE ENCOUNTER
Informed pt per Dr. Stinson pt can go back to work without any restrictions.      Patient states he needs a note for work.      Will leave the work note up on the  for pt to .

## 2023-02-10 NOTE — PROGRESS NOTES
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 steroid injection depending on patient's outcome.  As per current guidelines, epidurals will be limited to a maximum of 4 sessions per spinal region in a rolling twelve (12) month period. Continuation of epidural steroid injections over 12 months would only be considered under the following provisions;  • Patient is a high-risk surgical candidate, or the patient does not desire surgery, or recurrence of pain in the same location relieved with ESIs for at least three months and epidural provides at least 50% sustained improvement of pain and/or 50% objective improvement in function (using same scale as baseline)  • Pain is severe enough to cause a significant degree of functional disability or vocational disability  • The primary care provider will be notified regarding continuation of procand condition. Therefore, I have proposed the following plan:edures and repeat steroid use   Patient will follow-up with Dr. Silver thereafter for possible multilevel cervical discectomy and fusion centering around the C5-C6   2. Diagnostic studies: None indicated at this time  3. Pharmacological measures: Reviewed and discussed; Failed cyclobenzaprine, Naprosyn, Percocet, Lyrica gabapentin   A. Trial with Rheumate one tablet once daily  B. Start pyridoxine 100 mg one tablet by mouth daily take for 30 days, #30, no  refills  C. Start alpha lipoid acid 7597-1584 mg per day divided into 3 doses  D. Trial with prilocaine 2%, lidocaine 10%, imipramine 3%, capsaicin 0.001% and mannitol 20% cream, apply 1 to 2 grams of cream to the affected areas every 4 to 6 hours as needed  4. Long-term rehabilitation efforts:  A. The patient does not have a history of falls. I did complete a risk assessment for falls.   B. Patient will start a comprehensive physical therapy program for upper body strengthening/posture correction, gait and balance training, neurodynamics, ASTYM, E-STIM, myofascial release, cupping, dry needling, home exercise program,once pain is under control  C. Start an exercise program such as yoga and daily walks for fitness  D. Contrast therapy: Apply ice-packs for 15-20 minutes, followed by heating pads for 15-20 minutes to affected area   E. Cj Cifuentes  reports that he quit smoking about 33 years ago. His smoking use included cigarettes. He has never been exposed to tobacco smoke. His smokeless tobacco use includes snuff.  I have educated him on the risk of diseases from using tobacco products such as cancer, COPD and heart disease. I advised him to quit and he will consider that in the future. I spent 3  minutes counseling the patient.  5. The patient has been instructed to contact my office with any questions or difficulties. The patient understands the plan and agrees to proceed accordingly.    The patient has a documented plan of care to address chronic pain. Cj Cifuentes reports a pain score of  6/10.  Given his pain assessment as noted, treatment options were discussed and the following options were decided upon as a follow-up plan to address the patient's pain: continuation of current treatment plan for pain, educational materials on pain management, home exercises and therapy, prescription for non-opiod analgesics, referral to Physical Therapy, referral to specialist for assistance in pain treatment  guidance, steroid injections, use of non-medical modalities (ice, heat, stretching and/or behavior modifications) and Interventional pain management measures including neuromodulation.          Pain Management Panel     Pain Management Panel Latest Ref Rng & Units 10/16/2022    CREATININE UR mg/dL 39.9         DORON query complete. DORON reviewed by Enrique Madrigal MD.     Pain Medications             cyclobenzaprine (FLEXERIL) 5 MG tablet Take 1 tablet by mouth 3 (Three) Times a Day As Needed for Muscle Spasms (and severe pain).    naproxen sodium (ALEVE) 220 MG tablet Take 1 tablet by mouth 2 (Two) Times a Day As Needed for Headache or Mild Pain. OTC    oxyCODONE-acetaminophen (PERCOCET) 5-325 MG per tablet Take 1 tablet by mouth Every 6 (Six) Hours As Needed for Severe Pain.    pregabalin (LYRICA) 75 MG capsule Take 1 capsule by mouth 2 (Two) Times a Day. Take 1 tab PO daily x 1 week, BID thereafter Discontinue Gabapentin         Please note that portions of this note were completed with a voice recognition program.     Enrique Madrigal MD    Patient Care Team:  Gary Kaur MD as PCP - General (Family Medicine)  Enrique Madrigal MD as Consulting Physician (Pain Medicine)     No orders of the defined types were placed in this encounter.        Future Appointments   Date Time Provider Department Center   3/6/2023  9:50 AM Tuyet Stinson MD [facility]   3/13/2023  9:45 AM Gary Kaur MD [facility]

## 2023-02-11 PROBLEM — G56.02 LEFT CARPAL TUNNEL SYNDROME: Status: ACTIVE | Noted: 2023-02-11

## 2023-02-11 PROBLEM — M50.30 DDD (DEGENERATIVE DISC DISEASE), CERVICAL: Status: ACTIVE | Noted: 2023-02-11

## 2023-02-11 PROBLEM — M47.812 CERVICAL SPONDYLOSIS WITHOUT MYELOPATHY: Status: ACTIVE | Noted: 2023-02-11

## 2023-02-11 PROBLEM — M99.41 CONNECTIVE TISSUE STENOSIS OF NEURAL CANAL OF CERVICAL REGION: Status: ACTIVE | Noted: 2023-02-11

## 2023-02-11 PROBLEM — E11.42 DIABETIC POLYNEUROPATHY ASSOCIATED WITH TYPE 2 DIABETES MELLITUS: Status: ACTIVE | Noted: 2023-02-11

## 2023-02-14 ENCOUNTER — OFFICE VISIT (OUTPATIENT)
Dept: PAIN MEDICINE | Facility: CLINIC | Age: 73
End: 2023-02-14
Payer: OTHER MISCELLANEOUS

## 2023-02-14 VITALS
BODY MASS INDEX: 34.48 KG/M2 | SYSTOLIC BLOOD PRESSURE: 133 MMHG | WEIGHT: 232.8 LBS | HEIGHT: 69 IN | TEMPERATURE: 95.9 F | HEART RATE: 85 BPM | OXYGEN SATURATION: 97 % | DIASTOLIC BLOOD PRESSURE: 73 MMHG

## 2023-02-14 DIAGNOSIS — M47.812 CERVICAL SPONDYLOSIS WITHOUT MYELOPATHY: ICD-10-CM

## 2023-02-14 DIAGNOSIS — E66.9 OBESITY (BMI 30-39.9): Chronic | ICD-10-CM

## 2023-02-14 DIAGNOSIS — M54.12 CERVICAL RADICULOPATHY: ICD-10-CM
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
## 2023-02-14 DIAGNOSIS — E11.65 TYPE 2 DIABETES MELLITUS WITH HYPERGLYCEMIA, WITHOUT LONG-TERM CURRENT USE OF INSULIN: ICD-10-CM

## 2023-02-14 DIAGNOSIS — E11.42 DIABETIC POLYNEUROPATHY ASSOCIATED WITH TYPE 2 DIABETES MELLITUS: ICD-10-CM

## 2023-02-14 DIAGNOSIS — M99.41 CONNECTIVE TISSUE STENOSIS OF NEURAL CANAL OF CERVICAL REGION: ICD-10-CM

## 2023-02-14 DIAGNOSIS — M54.12 CERVICAL RADICULOPATHY: Primary | ICD-10-CM

## 2023-02-14 DIAGNOSIS — M50.30 DDD (DEGENERATIVE DISC DISEASE), CERVICAL: ICD-10-CM
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
## 2023-02-14 PROCEDURE — 99204 OFFICE O/P NEW MOD 45 MIN: CPT | Performed by: ANESTHESIOLOGY

## 2023-02-14 RX ORDER — ST. JOHN'S WORT 300 MG
400 CAPSULE ORAL 3 TIMES DAILY
Qty: 180 CAPSULE | Refills: 5 | Status: SHIPPED | OUTPATIENT
Start: 2023-02-14

## 2023-02-14 RX ORDER — MULTIVITAMIN WITH IRON
100 TABLET ORAL DAILY
Qty: 30 TABLET | Refills: 0 | Status: SHIPPED | OUTPATIENT
Start: 2023-02-14

## 2023-02-14 RX ORDER — ME-TETRAHYDROFOLATE/B12/HRB236 1-1-500 MG
1 CAPSULE ORAL DAILY
Qty: 90 CAPSULE | Refills: 1 | Status: SHIPPED | OUTPATIENT
Start: 2023-02-14 | End: 2023-03-13

## 2023-02-22 ENCOUNTER — TELEPHONE (OUTPATIENT)
Dept: PAIN MEDICINE | Facility: CLINIC | Age: 73
End: 2023-02-22
Payer: MEDICARE

## 2023-02-22 NOTE — TELEPHONE ENCOUNTER
LVM for pt advising that I faxed the progress note over to WC.  Faxed progress note to the fax number provided.

## 2023-02-22 NOTE — TELEPHONE ENCOUNTER
Caller: Cj Cifuentes    Relationship: Self    Best call back number: [phone]    What form or medical record are you requestin23 PROG NOTES    Who is requesting this form or medical record from you: [employer]    How would you like to receive the form or medical records (pick-up, mail, fax): FAX  If fax, what is the fax number: [phone]- ATTENTION: BELINDA    Timeframe paperwork needed: ASAP    Additional notes: PER ELOISA, THEY FAXED A MEDICAL RECORDS REQUEST SINCE 23.

## 2023-03-06 ENCOUNTER — OFFICE VISIT (OUTPATIENT)
Dept: UROLOGY | Facility: CLINIC | Age: 73
End: 2023-03-06
Payer: MEDICARE

## 2023-03-06 VITALS
HEART RATE: 57 BPM | BODY MASS INDEX: 34.36 KG/M2 | DIASTOLIC BLOOD PRESSURE: 62 MMHG | WEIGHT: 232 LBS | SYSTOLIC BLOOD PRESSURE: 136 MMHG | OXYGEN SATURATION: 96 % | HEIGHT: 69 IN

## 2023-03-06 DIAGNOSIS — N45.2 ORCHITIS: Primary | ICD-10-CM

## 2023-03-06 LAB
BILIRUB BLD-MCNC: NEGATIVE MG/DL
CLARITY, POC: ABNORMAL
COLOR UR: YELLOW
EXPIRATION DATE: ABNORMAL
GLUCOSE UR STRIP-MCNC: NEGATIVE MG/DL
KETONES UR QL: NEGATIVE
LEUKOCYTE EST, POC: ABNORMAL
Lab: ABNORMAL
NITRITE UR-MCNC: NEGATIVE MG/ML
PH UR: 6 [PH] (ref 5–8)
PROT UR STRIP-MCNC: ABNORMAL MG/DL
RBC # UR STRIP: ABNORMAL /UL
SP GR UR: 1.03 (ref 1–1.03)
UROBILINOGEN UR QL: NORMAL

## 2023-03-06 PROCEDURE — 81003 URINALYSIS AUTO W/O SCOPE: CPT | Performed by: UROLOGY

## 2023-03-06 PROCEDURE — 87086 URINE CULTURE/COLONY COUNT: CPT | Performed by: UROLOGY

## 2023-03-06 PROCEDURE — 99213 OFFICE O/P EST LOW 20 MIN: CPT | Performed by: UROLOGY
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
## 2023-03-07 LAB — BACTERIA SPEC AEROBE CULT: ABNORMAL

## 2023-03-09 ENCOUNTER — TELEPHONE (OUTPATIENT)
Dept: UROLOGY | Facility: CLINIC | Age: 73
End: 2023-03-09

## 2023-03-09 NOTE — TELEPHONE ENCOUNTER
Caller: Cj Cifuentes    Relationship: Self    Best call back number: [phone]    What is the best time to reach you: ANYTIME    Who are you requesting to speak with (clinical staff, provider,  specific staff member): CLINICAL    What was the reason for call: LAB RESULTS    Do you require a callback: YES, OK TO LEAVE VM

## 2023-03-10 ENCOUNTER — TELEPHONE (OUTPATIENT)
Dept: UROLOGY | Facility: CLINIC | Age: 73
End: 2023-03-10
Payer: MEDICARE

## 2023-03-10 DIAGNOSIS — N39.0 URINARY TRACT INFECTION WITHOUT HEMATURIA, SITE UNSPECIFIED: Primary | ICD-10-CM

## 2023-03-10 RX ORDER — FLUCONAZOLE 100 MG/1
200 TABLET ORAL DAILY
Qty: 28 TABLET | Refills: 0 | Status: SHIPPED | OUTPATIENT
Start: 2023-03-10 | End: 2023-03-24

## 2023-03-10 NOTE — TELEPHONE ENCOUNTER
Called patient because he grew yeast in his urine.   I will send prescription for fluconazole.  He is having mild dysuria

## 2023-03-13 ENCOUNTER — OFFICE VISIT (OUTPATIENT)
Dept: FAMILY MEDICINE CLINIC | Facility: CLINIC | Age: 73
End: 2023-03-13
Payer: MEDICARE

## 2023-03-13 VITALS
SYSTOLIC BLOOD PRESSURE: 130 MMHG | DIASTOLIC BLOOD PRESSURE: 80 MMHG | BODY MASS INDEX: 34.75 KG/M2 | HEART RATE: 88 BPM | HEIGHT: 69 IN | RESPIRATION RATE: 20 BRPM | WEIGHT: 234.6 LBS | TEMPERATURE: 97.5 F

## 2023-03-13 DIAGNOSIS — E66.09 CLASS 1 OBESITY DUE TO EXCESS CALORIES WITH SERIOUS COMORBIDITY AND BODY MASS INDEX (BMI) OF 34.0 TO 34.9 IN ADULT: ICD-10-CM

## 2023-03-13 DIAGNOSIS — E11.65 TYPE 2 DIABETES MELLITUS WITH HYPERGLYCEMIA, WITHOUT LONG-TERM CURRENT USE OF INSULIN: Primary | ICD-10-CM

## 2023-03-13 PROBLEM — A41.9 SEPSIS, DUE TO UNSPECIFIED ORGANISM, UNSPECIFIED WHETHER ACUTE ORGAN DYSFUNCTION PRESENT: Status: RESOLVED | Noted: 2023-01-01 | Resolved: 2023-03-13

## 2023-03-13 PROBLEM — E83.41 HYPERMAGNESEMIA: Status: RESOLVED | Noted: 2022-10-16 | Resolved: 2023-03-13

## 2023-03-13 PROBLEM — E83.39 HYPERPHOSPHATEMIA: Status: RESOLVED | Noted: 2022-10-16 | Resolved: 2023-03-13

## 2023-03-13 LAB
EXPIRATION DATE: ABNORMAL
HBA1C MFR BLD: 6.9 %
Lab: ABNORMAL

## 2023-03-13 PROCEDURE — 83036 HEMOGLOBIN GLYCOSYLATED A1C: CPT | Performed by: FAMILY MEDICINE

## 2023-03-13 PROCEDURE — 3044F HG A1C LEVEL LT 7.0%: CPT | Performed by: FAMILY MEDICINE

## 2023-03-13 PROCEDURE — 99214 OFFICE O/P EST MOD 30 MIN: CPT | Performed by: FAMILY MEDICINE

## 2023-03-13 PROCEDURE — 3079F DIAST BP 80-89 MM HG: CPT | Performed by: FAMILY MEDICINE

## 2023-03-13 PROCEDURE — 3075F SYST BP GE 130 - 139MM HG: CPT | Performed by: FAMILY MEDICINE

## 2023-03-13 RX ORDER — ATORVASTATIN CALCIUM 20 MG/1
20 TABLET, FILM COATED ORAL NIGHTLY
Qty: 90 TABLET | Refills: 3 | Status: SHIPPED | OUTPATIENT
Start: 2023-03-13

## 2023-03-13 NOTE — PROGRESS NOTES
"Chief Complaint   Patient presents with   • Follow-up   • Diabetes   • Hyperlipidemia     Pt say's, he is no longer taking Atorvastatin to his knowledge.        Subjective      Cj Cifuentes is a 72 y.o. who presents for diabetes follow-up.  Patient reports feeling well.  His weight has stabilized.  He has begun exercising.  He is not checking blood sugars frequently but reports most recent elevated blood sugar of 180 after drinking coffee.  He denies hypoglycemia.  He is taking metformin once daily.    The following portions of the patient's history were reviewed and updated as appropriate: allergies, current medications, past family history, past medical history, past social history, past surgical history and problem list.    Review of Systems    Objective   Vital Signs:  /80   Pulse 88   Temp 97.5 °F (36.4 °C)   Resp 20   Ht 175.3 cm (69.02\")   Wt 106 kg (234 lb 9.6 oz)   BMI 34.63 kg/m²     BMI is >= 30 and <35. (Class 1 Obesity). The following options were offered after discussion;: exercise counseling/recommendations        Physical Exam  Constitutional:       Appearance: Normal appearance.   Neck:      Vascular: No carotid bruit.   Cardiovascular:      Rate and Rhythm: Normal rate and regular rhythm.      Pulses: Normal pulses.           Dorsalis pedis pulses are 2+ on the right side and 2+ on the left side.        Posterior tibial pulses are 2+ on the right side and 2+ on the left side.      Heart sounds: Normal heart sounds.   Pulmonary:      Effort: Pulmonary effort is normal.      Breath sounds: Normal breath sounds.   Musculoskeletal:        Feet:    Feet:      Right foot:      Protective Sensation: 9 sites tested. 9 sites sensed.      Skin integrity: Skin integrity normal.      Toenail Condition: Right toenails are normal.      Left foot:      Protective Sensation: 9 sites tested. 6 sites sensed.      Skin integrity: Skin integrity normal.      Toenail Condition: Left toenails are normal. "      Comments: Diabetic Foot Exam Performed and Monofilament Test Performed     Neurological:      Mental Status: He is alert.          Result Review   The following data was reviewed by: Gary Kaur MD on 03/13/2023:  Most Recent A1C    HGBA1C Most Recent 3/13/23   Hemoglobin A1C 6.9                         Assessment and Plan  Diagnoses and all orders for this visit:    1. Type 2 diabetes mellitus with hyperglycemia, without long-term current use of insulin (HCC) (Primary)  Assessment & Plan:  Diabetes is improving with treatment.   Continue current treatment regimen.  Regular aerobic exercise.  Discussed foot care.  Ophthalmology referral.  Diabetes will be reassessed 4 months.  Patient will also be placed back on his statin    Orders:  -     metFORMIN (Glucophage) 500 MG tablet; Take 1 tablet by mouth Daily With Breakfast.  Dispense: 30 tablet; Refill: 3  -     atorvastatin (Lipitor) 20 MG tablet; Take 1 tablet by mouth Every Night.  Dispense: 90 tablet; Refill: 3  -     Ambulatory Referral for Diabetic Eye Exam-Ophthalmology  -     POC Glycosylated Hemoglobin (Hb A1C)    2. Class 1 obesity due to excess calories with serious comorbidity and body mass index (BMI) of 34.0 to 34.9 in adult  Assessment & Plan:  Patient's (Body mass index is 34.63 kg/m².) indicates that they are obese (BMI >30) with health conditions that include diabetes mellitus . Weight is improving with lifestyle modifications. BMI  is above average; BMI management plan is completed. We discussed portion control and increasing exercise.  Patient has lost 30 pounds since August.  While part of this was due to to illness it has had a positive impact on his dysglycemia for diabetes.  While patient is trying to lose more weight we discussed the benefit of maintaining the weight he has lost and avoiding weight regain.  Emphasis was placed on exercise for this      3. BMI 34.0-34.9,adult          Follow Up  Return in about 4 months (around  7/13/2023) for Next scheduled follow up.  Patient was given instructions and counseling regarding his condition or for health maintenance advice. Please see specific information pulled into the AVS if appropriate.

## 2023-03-13 NOTE — ASSESSMENT & PLAN NOTE
Patient's (Body mass index is 34.63 kg/m².) indicates that they are obese (BMI >30) with health conditions that include diabetes mellitus . Weight is improving with lifestyle modifications. BMI  is above average; BMI management plan is completed. We discussed portion control and increasing exercise.  Patient has lost 30 pounds since August.  While part of this was due to to illness it has had a positive impact on his dysglycemia for diabetes.  While patient is trying to lose more weight we discussed the benefit of maintaining the weight he has lost and avoiding weight regain.  Emphasis was placed on exercise for this

## 2023-03-13 NOTE — ASSESSMENT & PLAN NOTE
Diabetes is improving with treatment.   Continue current treatment regimen.  Regular aerobic exercise.  Discussed foot care.  Ophthalmology referral.  Diabetes will be reassessed 4 months.  Patient will also be placed back on his statin

## 2023-03-21 ENCOUNTER — TELEPHONE (OUTPATIENT)
Dept: PAIN MEDICINE | Facility: CLINIC | Age: 73
End: 2023-03-21
Payer: MEDICARE

## 2023-03-21 NOTE — TELEPHONE ENCOUNTER
LVM advising that we received approval to schedule his procedure. Please return my [phone].

## 2023-04-04 ENCOUNTER — TELEPHONE (OUTPATIENT)
Dept: UROLOGY | Facility: CLINIC | Age: 73
End: 2023-04-04
Payer: MEDICARE

## 2023-04-04 DIAGNOSIS — N39.0 URINARY TRACT INFECTION WITHOUT HEMATURIA, SITE UNSPECIFIED: Primary | ICD-10-CM

## 2023-04-04 LAB
BILIRUB BLD-MCNC: NEGATIVE MG/DL
CLARITY, POC: ABNORMAL
COLOR UR: YELLOW
EXPIRATION DATE: ABNORMAL
GLUCOSE UR STRIP-MCNC: NEGATIVE MG/DL
KETONES UR QL: NEGATIVE
LEUKOCYTE EST, POC: ABNORMAL
Lab: ABNORMAL
NITRITE UR-MCNC: NEGATIVE MG/ML
PH UR: 5.5 [PH] (ref 5–8)
PROT UR STRIP-MCNC: NEGATIVE MG/DL
RBC # UR STRIP: ABNORMAL /UL
SP GR UR: 1.03 (ref 1–1.03)
UROBILINOGEN UR QL: NORMAL

## 2023-04-04 PROCEDURE — 87086 URINE CULTURE/COLONY COUNT: CPT | Performed by: NURSE PRACTITIONER

## 2023-04-04 RX ORDER — SULFAMETHOXAZOLE AND TRIMETHOPRIM 800; 160 MG/1; MG/1
1 TABLET ORAL 2 TIMES DAILY
Qty: 10 TABLET | Refills: 0 | Status: SHIPPED | OUTPATIENT
Start: 2023-04-04 | End: 2023-04-09

## 2023-04-04 NOTE — TELEPHONE ENCOUNTER
Called patient to notify I sent Bactrim DS BID x 5 days to pharmacy and to go ahead and begin taking abx. Waiting for urine culture, his last cultures have shown yeast, previous culture with mixed akin. He was recently treated with two weeks of Fluconazole in March.

## 2023-04-04 NOTE — TELEPHONE ENCOUNTER
I spoke with Cj and let him know that he has abx at the pharmacy to  and that we are still waiting for his urine culture to come back but due to his history you wanted to go ahead and get something started just in case.  I also told him that if the culture showed resistance to the bactrim we would let him know and you would change it to something that it is susceptible to.  He said to tell you thank you!!

## 2023-04-05 LAB — BACTERIA SPEC AEROBE CULT: NO GROWTH

## 2023-04-06 ENCOUNTER — TELEPHONE (OUTPATIENT)
Dept: UROLOGY | Facility: CLINIC | Age: 73
End: 2023-04-06
Payer: MEDICARE

## 2023-04-07 NOTE — TELEPHONE ENCOUNTER
Patient called to ask about urine culture results and to see if the paperwork had been faxed to his work.  I let him know that I faxed them yesterday and his culture was good.

## 2023-04-10 ENCOUNTER — OUTSIDE FACILITY SERVICE (OUTPATIENT)
Dept: PAIN MEDICINE | Facility: CLINIC | Age: 73
End: 2023-04-10
Payer: OTHER MISCELLANEOUS

## 2023-04-10 PROCEDURE — 99152 MOD SED SAME PHYS/QHP 5/>YRS: CPT | Performed by: ANESTHESIOLOGY

## 2023-04-10 PROCEDURE — 62321 NJX INTERLAMINAR CRV/THRC: CPT | Performed by: ANESTHESIOLOGY

## 2023-04-11 ENCOUNTER — TELEPHONE (OUTPATIENT)
Dept: PAIN MEDICINE | Facility: CLINIC | Age: 73
End: 2023-04-11
Payer: MEDICARE

## 2023-04-11 NOTE — TELEPHONE ENCOUNTER
Caller: CALLIE ASHLEY    Relationship to patient: SELF    Best call back number: [phone]    Patient is needing: PATIENT STATES HE MISSED A CALL FROM THE PRACTICE- SAW THE NUMBER ON HIS PHONE- HE IS RETURNING THE CALL.  NO NOTES IN CHART ABOUT CALL AND PATIENT STATES NO MESSAGE/ NAME LEFT ON V/M

## 2023-04-11 NOTE — TELEPHONE ENCOUNTER
FOLLOW-UP CALL AFTER PROCEDURE  I spoke with Cj regarding how patient is feeling after yesterday's procedure with Dr. Madrigal. Patient reports that he is doing well.   Cj Cifuentes underwent a cervical epidural steroid injection by left paramedian interlaminar on 4/10/2023. Patient was examined in the recovery room after the procedure by Dr. Madrigal. Patient reported 100% pain relief. In addition, patient experienced significant functional improvement (performing activities without experiencing pain in comparison with examination prior to the procedure that reproduced pain with those activities).   Pain level before procedure: 9.5/10   Pain level after procedure: 0/10  Today, Cj Cifuentes reports 100% ongoing pain relief pain (pain level 0/10)   Patient denies side effects or complications  Patient does not have any questions or concerns at this time

## 2023-04-11 NOTE — TELEPHONE ENCOUNTER
Caller: LUCY    Relationship: [employer] (W/C)      What form or medical record are you requesting: WORK STATUS     How would you like to receive the form or medical records (pick-up, mail, fax): FAX  If fax, what is the fax number: [phone] ATTSERGE BELINDA    Timeframe paperwork needed: ASAP

## 2023-04-12 NOTE — TELEPHONE ENCOUNTER
Sent email to [email or web address] advising that our office doesn't determine work status for patients.

## 2023-04-13 ENCOUNTER — TELEPHONE (OUTPATIENT)
Dept: NEUROSURGERY | Facility: CLINIC | Age: 73
End: 2023-04-13
Payer: MEDICARE

## 2023-04-13 NOTE — TELEPHONE ENCOUNTER
Jose Carlos Choudhury, w/c nurse  called: Pt was seen by Dr. Madrigal and she requested a work status for him and their office emailed her and stated they do not do work statuses and that he needs to return to Dr. Silver to obtain this. Please advise! Thanks!    Office Visit with Carlton Silver MD (12/08/2022)      No follow ups on file.       Jose Carlos's contact: [phone]

## 2023-04-13 NOTE — TELEPHONE ENCOUNTER
Pt was released back to work by Dr. Silver on 12/22/23. Called number provided for Jose Carlos Choudhury W/C nurse  and [facility].

## 2023-04-17 ENCOUNTER — TELEPHONE (OUTPATIENT)
Dept: NEUROSURGERY | Facility: CLINIC | Age: 73
End: 2023-04-17
Payer: MEDICARE

## 2023-04-17 NOTE — TELEPHONE ENCOUNTER
Caller: LEATHA    Relationship: [employer] ()    Best call back number: BELINDA HARRINGTON @ PHONE NUMBER: [phone] ext [phone]    What was the call regarding: LEATHA CALLED TO OBTAIN THE LAST OVN FOR THE PATIENT DATED 12/08/02022    Do you require a callback:   PLEASE CALL BELINDA HARRINGTON WITH ANY QUESTIONS WHO IS THE NURSE .    THE LAST OVN CAN BE FAXED [phone]    THANK YOU

## 2023-05-03 ENCOUNTER — TELEPHONE (OUTPATIENT)
Dept: PAIN MEDICINE | Facility: CLINIC | Age: 73
End: 2023-05-03
Payer: MEDICARE

## 2023-05-03 DIAGNOSIS — M54.12 CERVICAL RADICULOPATHY: Primary | ICD-10-CM

## 2023-05-03 NOTE — TELEPHONE ENCOUNTER
Caller: [facility] -[facility]    Juan J call back number: [phone]    What form or medical record are you requesting: PHYSICAL THERAPY ORDER FOR THE NECK     Who is requesting this form or medical record from you: [facility]     How would you like to receive the form or medical records (pick-up, mail, fax): FAX   If fax, what is the fax number: [phone]    Timeframe paperwork needed: ASAP     Additional notes: 12 MORE VISITS- 2 TIMES A WEEK FOR 6 WEEKS

## 2023-05-09 NOTE — TELEPHONE ENCOUNTER
BELINDA CALLED FROM  WOULD LIKE A LETTER STATING THE DATE PT WAS RELEASED TO FULL DUTY WORK. PREVIOUS COMMS STATES 12-22-22, SHE WOULD LIKE IT IN WRITING. PT HAD PROSTATE SURGERY SO HAS REMAINED OFF WORK DUE TO THAT FACTOR NOT IN REFERENCE TO HIS DX WITH OUR PROVIDER.     PLEASE FAX [phone]  CLAIM NUMBER [id number]

## 2023-06-13 ENCOUNTER — OFFICE VISIT (OUTPATIENT)
Dept: UROLOGY | Facility: CLINIC | Age: 73
End: 2023-06-13
Payer: MEDICARE

## 2023-06-13 VITALS — WEIGHT: 234 LBS | BODY MASS INDEX: 34.66 KG/M2 | HEIGHT: 69 IN

## 2023-06-13 DIAGNOSIS — N45.2 ORCHITIS: Primary | ICD-10-CM

## 2023-06-13 LAB
BILIRUB BLD-MCNC: NEGATIVE MG/DL
CLARITY, POC: CLEAR
COLOR UR: YELLOW
EXPIRATION DATE: ABNORMAL
GLUCOSE UR STRIP-MCNC: ABNORMAL MG/DL
KETONES UR QL: NEGATIVE
LEUKOCYTE EST, POC: NEGATIVE
Lab: ABNORMAL
NITRITE UR-MCNC: NEGATIVE MG/ML
PH UR: 6.5 [PH] (ref 5–8)
PROT UR STRIP-MCNC: NEGATIVE MG/DL
RBC # UR STRIP: NEGATIVE /UL
SP GR UR: 1.01 (ref 1–1.03)
UROBILINOGEN UR QL: NORMAL

## 2023-06-13 PROCEDURE — 99214 OFFICE O/P EST MOD 30 MIN: CPT | Performed by: UROLOGY

## 2023-06-13 PROCEDURE — 81003 URINALYSIS AUTO W/O SCOPE: CPT | Performed by: UROLOGY

## 2023-06-13 PROCEDURE — 1159F MED LIST DOCD IN RCRD: CPT | Performed by: UROLOGY

## 2023-06-13 PROCEDURE — 1160F RVW MEDS BY RX/DR IN RCRD: CPT | Performed by: UROLOGY
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
## 2023-07-11 PROBLEM — E66.09 CLASS 1 OBESITY DUE TO EXCESS CALORIES WITH SERIOUS COMORBIDITY AND BODY MASS INDEX (BMI) OF 34.0 TO 34.9 IN ADULT: Status: ACTIVE | Noted: 2022-10-16

## 2023-07-11 PROBLEM — E66.811 CLASS 1 OBESITY DUE TO EXCESS CALORIES WITH SERIOUS COMORBIDITY AND BODY MASS INDEX (BMI) OF 34.0 TO 34.9 IN ADULT: Status: ACTIVE | Noted: 2022-10-16

## 2023-07-19 ENCOUNTER — TELEPHONE (OUTPATIENT)
Dept: PAIN MEDICINE | Facility: CLINIC | Age: 73
End: 2023-07-19

## 2023-07-19 NOTE — TELEPHONE ENCOUNTER
Caller: RUSH    Relationship: ASST TO NURSE     Best call back number: [phone]    What form or medical record are you requesting: OFFICE NOTES 7/18    Who is requesting this form or medical record from you: [employer]    How would you like to receive the form or medical records (pick-up, mail, fax): FAX  If fax, what is the fax number: [phone]    Timeframe paperwork needed: ASAP    Additional notes: ATTN: BELINDA

## 2023-08-21 DIAGNOSIS — M54.12 CERVICAL RADICULOPATHY: Primary | ICD-10-CM

## 2023-08-24 DIAGNOSIS — M54.12 CERVICAL RADICULOPATHY: Primary | ICD-10-CM

## 2023-08-28 ENCOUNTER — OUTSIDE FACILITY SERVICE (OUTPATIENT)
Dept: PAIN MEDICINE | Facility: CLINIC | Age: 73
End: 2023-08-28
Payer: OTHER MISCELLANEOUS

## 2023-08-28 PROCEDURE — 62321 NJX INTERLAMINAR CRV/THRC: CPT | Performed by: ANESTHESIOLOGY

## 2023-08-28 PROCEDURE — 99152 MOD SED SAME PHYS/QHP 5/>YRS: CPT | Performed by: ANESTHESIOLOGY

## 2023-09-05 ENCOUNTER — TELEPHONE (OUTPATIENT)
Dept: PAIN MEDICINE | Facility: CLINIC | Age: 73
End: 2023-09-05

## 2023-09-05 NOTE — TELEPHONE ENCOUNTER
Caller: LEATHA    Relationship: Other    Best call back number: [phone]    What form or medical record are you requesting: WORK STATUS REPORT; LAST PN    Who is requesting this form or medical record from you: [employer]    How would you like to receive the form or medical records (pick-up, mail, fax): FAX  If fax, what is the fax number: [phone]  Timeframe paperwork needed: ASAP

## 2023-10-17 ENCOUNTER — OFFICE VISIT (OUTPATIENT)
Dept: FAMILY MEDICINE CLINIC | Facility: CLINIC | Age: 73
End: 2023-10-17
Payer: MEDICARE

## 2023-10-17 VITALS
OXYGEN SATURATION: 94 % | TEMPERATURE: 97.1 F | WEIGHT: 264 LBS | SYSTOLIC BLOOD PRESSURE: 142 MMHG | RESPIRATION RATE: 16 BRPM | BODY MASS INDEX: 39.1 KG/M2 | DIASTOLIC BLOOD PRESSURE: 84 MMHG | HEIGHT: 69 IN | HEART RATE: 74 BPM

## 2023-10-17 DIAGNOSIS — E66.01 CLASS 2 SEVERE OBESITY DUE TO EXCESS CALORIES WITH SERIOUS COMORBIDITY AND BODY MASS INDEX (BMI) OF 38.0 TO 38.9 IN ADULT: ICD-10-CM

## 2023-10-17 DIAGNOSIS — E11.65 TYPE 2 DIABETES MELLITUS WITH HYPERGLYCEMIA, WITHOUT LONG-TERM CURRENT USE OF INSULIN: Primary | ICD-10-CM

## 2023-10-17 DIAGNOSIS — I10 ESSENTIAL HYPERTENSION: ICD-10-CM

## 2023-10-17 DIAGNOSIS — Z23 NEED FOR INFLUENZA VACCINATION: ICD-10-CM

## 2023-10-17 PROBLEM — E66.812 CLASS 2 SEVERE OBESITY DUE TO EXCESS CALORIES WITH SERIOUS COMORBIDITY AND BODY MASS INDEX (BMI) OF 38.0 TO 38.9 IN ADULT: Status: ACTIVE | Noted: 2022-10-16

## 2023-10-17 LAB
EXPIRATION DATE: ABNORMAL
HBA1C MFR BLD: 8.3 % (ref 4.5–5.7)
Lab: ABNORMAL

## 2023-10-17 RX ORDER — SEMAGLUTIDE 0.68 MG/ML
0.5 INJECTION, SOLUTION SUBCUTANEOUS WEEKLY
Qty: 3 ML | Refills: 1 | Status: SHIPPED | OUTPATIENT
Start: 2023-10-17

## 2023-10-17 NOTE — PROGRESS NOTES
"Chief Complaint   Patient presents with    3mo f/u DM        Subjective      Cj Cifuentes is a 73 y.o. who presents for DM and HTN.  He denies changes in health since his last visit.  He has gained an additional 17 pounds.  Morning blood sugars are usually between 130 and 160.  He has not had any recent hypoglycemic event.  He continues to use his CGM.  He also reports blood pressures have gradually increased at home with systolics between 130 and 145 and diastolics in the 70s and 80s.  While he is aware of his weight regain he reports that he is still wearing the same size pants.  He is exercising 3 days/week with a combination of weight lifting and aerobic activity on a treadmill and bicycle.    The following portions of the patient's history were reviewed and updated as appropriate: allergies, current medications, past family history, past medical history, past social history, past surgical history, and problem list.    Review of Systems    Objective   Vital Signs:  /84   Pulse 74   Temp 97.1 °F (36.2 °C)   Resp 16   Ht 175.3 cm (69\")   Wt 120 kg (264 lb)   SpO2 94%   BMI 38.99 kg/m²               Physical Exam  Vitals reviewed.   Constitutional:       Appearance: Normal appearance.   Cardiovascular:      Rate and Rhythm: Normal rate and regular rhythm.      Pulses: Normal pulses.      Heart sounds: Normal heart sounds.   Pulmonary:      Effort: Pulmonary effort is normal.      Breath sounds: Normal breath sounds.   Neurological:      Mental Status: He is alert.          Result Review                     Assessment and Plan  Diagnoses and all orders for this visit:    1. Type 2 diabetes mellitus with hyperglycemia, without long-term current use of insulin (Primary)  -     POC Glycosylated Hemoglobin (Hb A1C)  -     Semaglutide,0.25 or 0.5MG/DOS, (Ozempic, 0.25 or 0.5 MG/DOSE,) 2 MG/3ML solution pen-injector; Inject 0.5 mg under the skin into the appropriate area as directed 1 (One) Time Per Week. "  Dispense: 3 mL; Refill: 1  -     metFORMIN (Glucophage) 500 MG tablet; Take 2 tablets by mouth Daily With Breakfast.  Dispense: 60 tablet; Refill: 3    2. Need for influenza vaccination  -     Fluzone High-Dose 65+yrs ([id number])    3. Class 2 severe obesity due to excess calories with serious comorbidity and body mass index (BMI) of 38.0 to 38.9 in adult    4. Essential hypertension    Plan  1.  Diabetes is not controlled.  Ozempic will be added at 0.25 mg weekly for 4 weeks then increase to 0.5 mg.  Patient will return in 3 months for reassessment.  2.  Blood pressure is not controlled.  Focus will be on lowering his blood sugar and hopefully his weight will follow suit with use of the Ozempic.  This would likely return his blood pressure to the levels he had at his prior weight.  If not antihypertensives will be added  3.  Obesity is worsening.  Ozempic should aid with weight control.  He will continue his exercise plan.  4.  Flu vaccine given today      Follow Up  Return in about 3 months (around 1/17/2024) for Medicare Wellness.  Patient was given instructions and counseling regarding his condition or for health maintenance advice. Please see specific information pulled into the AVS if appropriate.

## 2023-11-30 ENCOUNTER — TELEPHONE (OUTPATIENT)
Dept: PAIN MEDICINE | Facility: CLINIC | Age: 73
End: 2023-11-30
Payer: MEDICARE

## 2023-11-30 NOTE — TELEPHONE ENCOUNTER
Caller: Cj Cifuentes    Relationship to patient: Self    Best call back number: [phone]    Type of visit: PROCEDURE FOLLOW UP FROM 08/28/2023    Requested date: 12/12/2023     If rescheduling, when is the original appointment: 12/07/2023

## 2023-12-05 ENCOUNTER — TELEPHONE (OUTPATIENT)
Dept: PAIN MEDICINE | Facility: CLINIC | Age: 73
End: 2023-12-05
Payer: MEDICARE

## 2023-12-12 ENCOUNTER — OFFICE VISIT (OUTPATIENT)
Dept: UROLOGY | Facility: CLINIC | Age: 73
End: 2023-12-12
Payer: MEDICARE

## 2023-12-12 VITALS
HEART RATE: 68 BPM | DIASTOLIC BLOOD PRESSURE: 74 MMHG | HEIGHT: 69 IN | SYSTOLIC BLOOD PRESSURE: 128 MMHG | OXYGEN SATURATION: 94 % | BODY MASS INDEX: 36.73 KG/M2 | WEIGHT: 248 LBS

## 2023-12-12 DIAGNOSIS — N40.1 BENIGN PROSTATIC HYPERPLASIA WITH URINARY RETENTION: ICD-10-CM

## 2023-12-12 DIAGNOSIS — R33.8 BENIGN PROSTATIC HYPERPLASIA WITH URINARY RETENTION: ICD-10-CM

## 2023-12-12 DIAGNOSIS — N45.2 ORCHITIS: Primary | ICD-10-CM

## 2023-12-12 PROCEDURE — 1159F MED LIST DOCD IN RCRD: CPT | Performed by: NURSE PRACTITIONER

## 2023-12-12 PROCEDURE — 3074F SYST BP LT 130 MM HG: CPT | Performed by: NURSE PRACTITIONER

## 2023-12-12 PROCEDURE — 1160F RVW MEDS BY RX/DR IN RCRD: CPT | Performed by: NURSE PRACTITIONER

## 2023-12-12 PROCEDURE — 81003 URINALYSIS AUTO W/O SCOPE: CPT | Performed by: NURSE PRACTITIONER

## 2023-12-12 PROCEDURE — 51798 US URINE CAPACITY MEASURE: CPT | Performed by: NURSE PRACTITIONER

## 2023-12-12 PROCEDURE — 3078F DIAST BP <80 MM HG: CPT | Performed by: NURSE PRACTITIONER

## 2023-12-12 PROCEDURE — 99213 OFFICE O/P EST LOW 20 MIN: CPT | Performed by: NURSE PRACTITIONER
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
## 2023-12-30 DIAGNOSIS — E11.65 TYPE 2 DIABETES MELLITUS WITH HYPERGLYCEMIA, WITHOUT LONG-TERM CURRENT USE OF INSULIN: ICD-10-CM

## 2024-01-03 RX ORDER — SEMAGLUTIDE 0.68 MG/ML
INJECTION, SOLUTION SUBCUTANEOUS
Qty: 3 ML | Refills: 1 | Status: SHIPPED | OUTPATIENT
Start: 2024-01-03

## 2024-01-04 ENCOUNTER — TELEPHONE (OUTPATIENT)
Dept: FAMILY MEDICINE CLINIC | Facility: CLINIC | Age: 74
End: 2024-01-04
Payer: MEDICARE

## 2024-01-04 DIAGNOSIS — E11.65 TYPE 2 DIABETES MELLITUS WITH HYPERGLYCEMIA, WITHOUT LONG-TERM CURRENT USE OF INSULIN: ICD-10-CM

## 2024-01-04 NOTE — TELEPHONE ENCOUNTER
Caller: Cj Cifuentes    Relationship: Self    Best call back number: [phone]     Requested Prescriptions:   Requested Prescriptions     Pending Prescriptions Disp Refills    Continuous Blood Gluc Sensor (FreeStyle Kera 2 Sensor) misc 2 each 11     Sig: Use 1 each Every 14 (Fourteen) Days.        [id number] [place] [id number] [facility] AT [facility] [phone] [facility] [phone]      Last office visit with prescribing clinician: 10/17/2023   Last telemedicine visit with prescribing clinician: Visit date not found   Next office visit with prescribing clinician: 1/30/2024     Additional details provided by patient: NONE LEFT    Does the patient have less than a 3 day supply:  [x] Yes  [] No    Would you like a call back once the refill request has been completed: [x] Yes [] No    If the office needs to give you a call back, can they leave a voicemail: [] Yes [] No    Keiry Arevalo Rep   01/04/24 13:24 EST

## 2024-01-23 ENCOUNTER — OFFICE VISIT (OUTPATIENT)
Dept: PAIN MEDICINE | Facility: CLINIC | Age: 74
End: 2024-01-23
Payer: OTHER MISCELLANEOUS

## 2024-01-23 VITALS — BODY MASS INDEX: 38.42 KG/M2 | HEIGHT: 69 IN | WEIGHT: 259.4 LBS

## 2024-01-23 DIAGNOSIS — M50.30 DDD (DEGENERATIVE DISC DISEASE), CERVICAL: ICD-10-CM

## 2024-01-23 DIAGNOSIS — M47.812 CERVICAL SPONDYLOSIS WITHOUT MYELOPATHY: ICD-10-CM

## 2024-01-23 DIAGNOSIS — E11.42 DIABETIC POLYNEUROPATHY ASSOCIATED WITH TYPE 2 DIABETES MELLITUS: ICD-10-CM
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
## 2024-01-23 DIAGNOSIS — M54.12 CERVICAL RADICULOPATHY: ICD-10-CM

## 2024-01-23 DIAGNOSIS — M99.41 CONNECTIVE TISSUE STENOSIS OF NEURAL CANAL OF CERVICAL REGION: ICD-10-CM

## 2024-01-23 DIAGNOSIS — E11.65 TYPE 2 DIABETES MELLITUS WITH HYPERGLYCEMIA, WITHOUT LONG-TERM CURRENT USE OF INSULIN: ICD-10-CM

## 2024-01-23 DIAGNOSIS — E66.9 OBESITY (BMI 30-39.9): ICD-10-CM

## 2024-01-23 PROCEDURE — 99214 OFFICE O/P EST MOD 30 MIN: CPT | Performed by: NURSE PRACTITIONER
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
## 2024-01-30 ENCOUNTER — OFFICE VISIT (OUTPATIENT)
Dept: FAMILY MEDICINE CLINIC | Facility: CLINIC | Age: 74
End: 2024-01-30
Payer: MEDICARE

## 2024-01-30 VITALS
HEIGHT: 69 IN | HEART RATE: 76 BPM | WEIGHT: 258 LBS | OXYGEN SATURATION: 98 % | DIASTOLIC BLOOD PRESSURE: 84 MMHG | SYSTOLIC BLOOD PRESSURE: 138 MMHG | TEMPERATURE: 98 F | BODY MASS INDEX: 38.21 KG/M2 | RESPIRATION RATE: 20 BRPM

## 2024-01-30 DIAGNOSIS — Z00.00 MEDICARE ANNUAL WELLNESS VISIT, SUBSEQUENT: Primary | ICD-10-CM

## 2024-01-30 DIAGNOSIS — E03.8 SUBCLINICAL HYPOTHYROIDISM: ICD-10-CM

## 2024-01-30 DIAGNOSIS — E66.01 CLASS 2 SEVERE OBESITY DUE TO EXCESS CALORIES WITH SERIOUS COMORBIDITY AND BODY MASS INDEX (BMI) OF 38.0 TO 38.9 IN ADULT: ICD-10-CM

## 2024-01-30 DIAGNOSIS — E11.65 TYPE 2 DIABETES MELLITUS WITH HYPERGLYCEMIA, WITHOUT LONG-TERM CURRENT USE OF INSULIN: ICD-10-CM

## 2024-01-30 LAB
ALBUMIN SERPL-MCNC: 4.5 G/DL (ref 3.5–5.2)
ALBUMIN/GLOB SERPL: 2 G/DL
ALP SERPL-CCNC: 81 U/L (ref 39–117)
ALT SERPL-CCNC: 18 U/L (ref 1–41)
AST SERPL-CCNC: 12 U/L (ref 1–40)
BASOPHILS # BLD AUTO: 0.06 10*3/MM3 (ref 0–0.2)
BASOPHILS NFR BLD AUTO: 0.6 % (ref 0–1.5)
BILIRUB SERPL-MCNC: 0.6 MG/DL (ref 0–1.2)
BUN SERPL-MCNC: 14 MG/DL (ref 8–23)
BUN/CREAT SERPL: 15.1 (ref 7–25)
CALCIUM SERPL-MCNC: 9.4 MG/DL (ref 8.6–10.5)
CHLORIDE SERPL-SCNC: 100 MMOL/L (ref 98–107)
CHOLEST SERPL-MCNC: 167 MG/DL (ref 0–200)
CO2 SERPL-SCNC: 24.7 MMOL/L (ref 22–29)
CREAT SERPL-MCNC: 0.93 MG/DL (ref 0.76–1.27)
EGFRCR SERPLBLD CKD-EPI 2021: 87.8 ML/MIN/1.73
EOSINOPHIL # BLD AUTO: 0.25 10*3/MM3 (ref 0–0.4)
EOSINOPHIL NFR BLD AUTO: 2.6 % (ref 0.3–6.2)
ERYTHROCYTE [DISTWIDTH] IN BLOOD BY AUTOMATED COUNT: 13.1 % (ref 12.3–15.4)
EXPIRATION DATE: ABNORMAL
GLOBULIN SER CALC-MCNC: 2.3 GM/DL
GLUCOSE SERPL-MCNC: 139 MG/DL (ref 65–99)
HBA1C MFR BLD: 7 % (ref 4.5–5.7)
HCT VFR BLD AUTO: 45.4 % (ref 37.5–51)
HDLC SERPL-MCNC: 28 MG/DL (ref 40–60)
HGB BLD-MCNC: 15.6 G/DL (ref 13–17.7)
IMM GRANULOCYTES # BLD AUTO: 0.04 10*3/MM3 (ref 0–0.05)
IMM GRANULOCYTES NFR BLD AUTO: 0.4 % (ref 0–0.5)
LDLC SERPL CALC-MCNC: 85 MG/DL (ref 0–100)
LYMPHOCYTES # BLD AUTO: 2.08 10*3/MM3 (ref 0.7–3.1)
LYMPHOCYTES NFR BLD AUTO: 21.8 % (ref 19.6–45.3)
Lab: ABNORMAL
MCH RBC QN AUTO: 30.5 PG (ref 26.6–33)
MCHC RBC AUTO-ENTMCNC: 34.4 G/DL (ref 31.5–35.7)
MCV RBC AUTO: 88.7 FL (ref 79–97)
MONOCYTES # BLD AUTO: 0.84 10*3/MM3 (ref 0.1–0.9)
MONOCYTES NFR BLD AUTO: 8.8 % (ref 5–12)
NEUTROPHILS # BLD AUTO: 6.29 10*3/MM3 (ref 1.7–7)
NEUTROPHILS NFR BLD AUTO: 65.8 % (ref 42.7–76)
NRBC BLD AUTO-RTO: 0 /100 WBC (ref 0–0.2)
PLATELET # BLD AUTO: 241 10*3/MM3 (ref 140–450)
POTASSIUM SERPL-SCNC: 4.6 MMOL/L (ref 3.5–5.2)
PROT SERPL-MCNC: 6.8 G/DL (ref 6–8.5)
RBC # BLD AUTO: 5.12 10*6/MM3 (ref 4.14–5.8)
SODIUM SERPL-SCNC: 137 MMOL/L (ref 136–145)
TRIGL SERPL-MCNC: 328 MG/DL (ref 0–150)
TSH SERPL DL<=0.005 MIU/L-ACNC: 4.86 UIU/ML (ref 0.27–4.2)
VLDLC SERPL CALC-MCNC: 54 MG/DL (ref 5–40)
WBC # BLD AUTO: 9.56 10*3/MM3 (ref 3.4–10.8)

## 2024-01-30 RX ORDER — SEMAGLUTIDE 1.34 MG/ML
1 INJECTION, SOLUTION SUBCUTANEOUS WEEKLY
Qty: 3 ML | Refills: 6 | Status: SHIPPED | OUTPATIENT
Start: 2024-01-30

## 2024-01-30 NOTE — ASSESSMENT & PLAN NOTE
Diabetes is improving with treatment.   Continue current treatment regimen.  Diabetes will be reassessed in 6 months.    Ozempic will be increased to 1 mg  Any hypoglycemia on his CGM will be verified with traditional fingerstick glucose monitor
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
## 2024-03-07 ENCOUNTER — APPOINTMENT (OUTPATIENT)
Dept: MRI IMAGING | Facility: HOSPITAL | Age: 74
End: 2024-03-07
Payer: MEDICARE

## 2024-03-07 ENCOUNTER — OFFICE VISIT (OUTPATIENT)
Dept: FAMILY MEDICINE CLINIC | Facility: CLINIC | Age: 74
End: 2024-03-07
Payer: MEDICARE

## 2024-03-07 ENCOUNTER — APPOINTMENT (OUTPATIENT)
Dept: GENERAL RADIOLOGY | Facility: HOSPITAL | Age: 74
End: 2024-03-07
Payer: MEDICARE

## 2024-03-07 ENCOUNTER — APPOINTMENT (OUTPATIENT)
Dept: CT IMAGING | Facility: HOSPITAL | Age: 74
End: 2024-03-07
Payer: MEDICARE

## 2024-03-07 ENCOUNTER — CLINICAL SUPPORT (OUTPATIENT)
Dept: FAMILY MEDICINE CLINIC | Facility: CLINIC | Age: 74
End: 2024-03-07
Payer: MEDICARE

## 2024-03-07 ENCOUNTER — HOSPITAL ENCOUNTER (OUTPATIENT)
Facility: HOSPITAL | Age: 74
Setting detail: OBSERVATION
Discharge: HOME | End: 2024-03-09
Attending: EMERGENCY MEDICINE | Admitting: FAMILY MEDICINE
Payer: MEDICARE

## 2024-03-07 VITALS — SYSTOLIC BLOOD PRESSURE: 162 MMHG | DIASTOLIC BLOOD PRESSURE: 80 MMHG | HEART RATE: 54 BPM | OXYGEN SATURATION: 94 %

## 2024-03-07 DIAGNOSIS — I63.9 CEREBROVASCULAR ACCIDENT (CVA), UNSPECIFIED MECHANISM: Primary | ICD-10-CM

## 2024-03-07 DIAGNOSIS — I63.9 STROKE DETERMINED BY CLINICAL ASSESSMENT: ICD-10-CM

## 2024-03-07 DIAGNOSIS — I10 ESSENTIAL HYPERTENSION: Primary | ICD-10-CM

## 2024-03-07 DIAGNOSIS — R47.1 DYSARTHRIA: ICD-10-CM

## 2024-03-07 DIAGNOSIS — E11.65 TYPE 2 DIABETES MELLITUS WITH HYPERGLYCEMIA, WITHOUT LONG-TERM CURRENT USE OF INSULIN: ICD-10-CM

## 2024-03-07 DIAGNOSIS — I10 ESSENTIAL HYPERTENSION: ICD-10-CM

## 2024-03-07 DIAGNOSIS — R20.2 PARESTHESIA: ICD-10-CM

## 2024-03-07 DIAGNOSIS — G45.9 TIA (TRANSIENT ISCHEMIC ATTACK): Primary | ICD-10-CM

## 2024-03-07 PROBLEM — H53.9 VISUAL CHANGES: Status: ACTIVE | Noted: 2024-03-07

## 2024-03-07 LAB
ALT SERPL W P-5'-P-CCNC: 14 U/L (ref 1–41)
APTT PPP: 25.4 SECONDS (ref 22–39)
AST SERPL-CCNC: 14 U/L (ref 1–40)
BASOPHILS # BLD AUTO: 0.08 10*3/MM3 (ref 0–0.2)
BASOPHILS NFR BLD AUTO: 0.8 % (ref 0–1.5)
BUN BLDA-MCNC: 12 MG/DL
BUN BLDA-MCNC: 12 MG/DL (ref 8–26)
CA-I BLDA-SCNC: 1.24 MMOL/L (ref 1.2–1.32)
CALCIUM BLD QL: 1.24 MG/DL
CHLORIDE BLDA-SCNC: 99 MMOL/L (ref 98–109)
CHLORIDE BLDA-SCNC: 99 MMOL/L (ref 98–109)
CO2 BLDA-SCNC: 25 MMOL/L (ref 24–29)
CREAT BLDA-MCNC: 0.9 MG/DL (ref 0.6–1.3)
CREAT BLDA-MCNC: 0.9 MG/DL (ref 0.6–1.3)
DEPRECATED RDW RBC AUTO: 38.5 FL (ref 37–54)
EGFRCR SERPLBLD CKD-EPI 2021: 91.3 ML/MIN/1.73
EOSINOPHIL # BLD AUTO: 0.18 10*3/MM3 (ref 0–0.4)
EOSINOPHIL NFR BLD AUTO: 1.7 % (ref 0.3–6.2)
ERYTHROCYTE [DISTWIDTH] IN BLOOD BY AUTOMATED COUNT: 12.6 % (ref 12.3–15.4)
GLUCOSE BLDC GLUCOMTR-MCNC: 116 MG/DL (ref 70–130)
GLUCOSE BLDC GLUCOMTR-MCNC: 128 MG/DL (ref 70–130)
GLUCOSE BLDC GLUCOMTR-MCNC: 128 MG/DL (ref 70–130)
GLUCOSE BLDC GLUCOMTR-MCNC: 133 MG/DL (ref 70–130)
GLUCOSE BLDC GLUCOMTR-MCNC: 136 MG/DL (ref 70–130)
HCT VFR BLD AUTO: 44.9 % (ref 37.5–51)
HCT VFR BLDA CALC: 46 % (ref 38–51)
HCT VFR BLDA CALC: 46 % (ref 38–51)
HGB BLD-MCNC: 15.7 G/DL (ref 13–17.7)
HGB BLDA-MCNC: 15.6 G/DL (ref 12–17)
HGB BLDA-MCNC: 15.6 G/DL (ref 12–17)
HOLD SPECIMEN: NORMAL
IMM GRANULOCYTES # BLD AUTO: 0.04 10*3/MM3 (ref 0–0.05)
IMM GRANULOCYTES NFR BLD AUTO: 0.4 % (ref 0–0.5)
INR PPP: 1 (ref 0.9–1.1)
INR PPP: 1.1 (ref 0.8–1.2)
LYMPHOCYTES # BLD AUTO: 1.94 10*3/MM3 (ref 0.7–3.1)
LYMPHOCYTES NFR BLD AUTO: 18.8 % (ref 19.6–45.3)
MCH RBC QN AUTO: 29.7 PG (ref 26.6–33)
MCHC RBC AUTO-ENTMCNC: 35 G/DL (ref 31.5–35.7)
MCV RBC AUTO: 84.9 FL (ref 79–97)
MONOCYTES # BLD AUTO: 0.8 10*3/MM3 (ref 0.1–0.9)
MONOCYTES NFR BLD AUTO: 7.7 % (ref 5–12)
NEUTROPHILS NFR BLD AUTO: 7.29 10*3/MM3 (ref 1.7–7)
NEUTROPHILS NFR BLD AUTO: 70.6 % (ref 42.7–76)
NRBC BLD AUTO-RTO: 0 /100 WBC (ref 0–0.2)
PLATELET # BLD AUTO: 238 10*3/MM3 (ref 140–450)
PMV BLD AUTO: 10.2 FL (ref 6–12)
POTASSIUM BLDA-SCNC: 4.1 MMOL/L (ref 3.5–4.9)
POTASSIUM BLDA-SCNC: 4.1 MMOL/L (ref 3.5–4.9)
PROTHROMBIN TIME: 13 SECONDS
PROTHROMBIN TIME: 13 SECONDS (ref 12.8–15.2)
QT INTERVAL: 382 MS
QTC INTERVAL: 412 MS
RBC # BLD AUTO: 5.29 10*6/MM3 (ref 4.14–5.8)
SODIUM BLD-SCNC: 137 MMOL/L (ref 138–146)
SODIUM BLD-SCNC: 137 MMOL/L (ref 138–146)
TROPONIN T SERPL HS-MCNC: 23 NG/L
WBC NRBC COR # BLD AUTO: 10.33 10*3/MM3 (ref 3.4–10.8)
WHOLE BLOOD HOLD COAG: NORMAL
WHOLE BLOOD HOLD SPECIMEN: NORMAL

## 2024-03-07 PROCEDURE — 70450 CT HEAD/BRAIN W/O DYE: CPT

## 2024-03-07 PROCEDURE — 85025 COMPLETE CBC W/AUTO DIFF WBC: CPT | Performed by: EMERGENCY MEDICINE

## 2024-03-07 PROCEDURE — 85014 HEMATOCRIT: CPT

## 2024-03-07 PROCEDURE — 99291 CRITICAL CARE FIRST HOUR: CPT

## 2024-03-07 PROCEDURE — 84484 ASSAY OF TROPONIN QUANT: CPT | Performed by: EMERGENCY MEDICINE

## 2024-03-07 PROCEDURE — G0378 HOSPITAL OBSERVATION PER HR: HCPCS

## 2024-03-07 PROCEDURE — 99285 EMERGENCY DEPT VISIT HI MDM: CPT

## 2024-03-07 PROCEDURE — 70498 CT ANGIOGRAPHY NECK: CPT

## 2024-03-07 PROCEDURE — 92523 SPEECH SOUND LANG COMPREHEN: CPT

## 2024-03-07 PROCEDURE — 71045 X-RAY EXAM CHEST 1 VIEW: CPT

## 2024-03-07 PROCEDURE — 0042T HC CT CEREBRAL PERFUSION W/WO CONTRAST: CPT

## 2024-03-07 PROCEDURE — 99204 OFFICE O/P NEW MOD 45 MIN: CPT

## 2024-03-07 PROCEDURE — 80047 BASIC METABLC PNL IONIZED CA: CPT

## 2024-03-07 PROCEDURE — 85730 THROMBOPLASTIN TIME PARTIAL: CPT | Performed by: EMERGENCY MEDICINE

## 2024-03-07 PROCEDURE — 85610 PROTHROMBIN TIME: CPT

## 2024-03-07 PROCEDURE — 70496 CT ANGIOGRAPHY HEAD: CPT

## 2024-03-07 PROCEDURE — 93005 ELECTROCARDIOGRAM TRACING: CPT | Performed by: EMERGENCY MEDICINE

## 2024-03-07 PROCEDURE — 84460 ALANINE AMINO (ALT) (SGPT): CPT | Performed by: EMERGENCY MEDICINE

## 2024-03-07 PROCEDURE — 99222 1ST HOSP IP/OBS MODERATE 55: CPT | Performed by: STUDENT IN AN ORGANIZED HEALTH CARE EDUCATION/TRAINING PROGRAM

## 2024-03-07 PROCEDURE — 70551 MRI BRAIN STEM W/O DYE: CPT

## 2024-03-07 PROCEDURE — 25510000001 IOPAMIDOL PER 1 ML: Performed by: EMERGENCY MEDICINE

## 2024-03-07 PROCEDURE — 82948 REAGENT STRIP/BLOOD GLUCOSE: CPT

## 2024-03-07 PROCEDURE — 84450 TRANSFERASE (AST) (SGOT): CPT | Performed by: EMERGENCY MEDICINE

## 2024-03-07 RX ORDER — SODIUM CHLORIDE 0.9 % (FLUSH) 0.9 %
10 SYRINGE (ML) INJECTION AS NEEDED
Status: DISCONTINUED | OUTPATIENT
Start: 2024-03-07 | End: 2024-03-09 | Stop reason: HOSPADM

## 2024-03-07 RX ORDER — SODIUM CHLORIDE 0.9 % (FLUSH) 0.9 %
10 SYRINGE (ML) INJECTION AS NEEDED
Status: DISCONTINUED | OUTPATIENT
Start: 2024-03-07 | End: 2024-03-07

## 2024-03-07 RX ORDER — BISACODYL 5 MG/1
5 TABLET, DELAYED RELEASE ORAL DAILY PRN
Status: DISCONTINUED | OUTPATIENT
Start: 2024-03-07 | End: 2024-03-09 | Stop reason: HOSPADM

## 2024-03-07 RX ORDER — SODIUM CHLORIDE 9 MG/ML
40 INJECTION, SOLUTION INTRAVENOUS AS NEEDED
Status: DISCONTINUED | OUTPATIENT
Start: 2024-03-07 | End: 2024-03-09 | Stop reason: HOSPADM

## 2024-03-07 RX ORDER — AMOXICILLIN 250 MG
2 CAPSULE ORAL 2 TIMES DAILY PRN
Status: DISCONTINUED | OUTPATIENT
Start: 2024-03-07 | End: 2024-03-09 | Stop reason: HOSPADM

## 2024-03-07 RX ORDER — ATORVASTATIN CALCIUM 40 MG/1
80 TABLET, FILM COATED ORAL NIGHTLY
Status: DISCONTINUED | OUTPATIENT
Start: 2024-03-07 | End: 2024-03-09 | Stop reason: HOSPADM

## 2024-03-07 RX ORDER — SODIUM CHLORIDE 0.9 % (FLUSH) 0.9 %
10 SYRINGE (ML) INJECTION EVERY 12 HOURS SCHEDULED
Status: DISCONTINUED | OUTPATIENT
Start: 2024-03-07 | End: 2024-03-09 | Stop reason: HOSPADM

## 2024-03-07 RX ORDER — IBUPROFEN 600 MG/1
1 TABLET ORAL
Status: DISCONTINUED | OUTPATIENT
Start: 2024-03-07 | End: 2024-03-09 | Stop reason: HOSPADM

## 2024-03-07 RX ORDER — CLOPIDOGREL BISULFATE 75 MG/1
75 TABLET ORAL DAILY
Status: DISCONTINUED | OUTPATIENT
Start: 2024-03-08 | End: 2024-03-09 | Stop reason: HOSPADM

## 2024-03-07 RX ORDER — DEXTROSE MONOHYDRATE 25 G/50ML
25 INJECTION, SOLUTION INTRAVENOUS
Status: DISCONTINUED | OUTPATIENT
Start: 2024-03-07 | End: 2024-03-09 | Stop reason: HOSPADM

## 2024-03-07 RX ORDER — BISACODYL 10 MG
10 SUPPOSITORY, RECTAL RECTAL DAILY PRN
Status: DISCONTINUED | OUTPATIENT
Start: 2024-03-07 | End: 2024-03-09 | Stop reason: HOSPADM

## 2024-03-07 RX ORDER — POLYETHYLENE GLYCOL 3350 17 G/17G
17 POWDER, FOR SOLUTION ORAL DAILY PRN
Status: DISCONTINUED | OUTPATIENT
Start: 2024-03-07 | End: 2024-03-09 | Stop reason: HOSPADM

## 2024-03-07 RX ORDER — CLOPIDOGREL BISULFATE 75 MG/1
300 TABLET ORAL ONCE
Status: COMPLETED | OUTPATIENT
Start: 2024-03-07 | End: 2024-03-07

## 2024-03-07 RX ORDER — INSULIN LISPRO 100 [IU]/ML
2-7 INJECTION, SOLUTION INTRAVENOUS; SUBCUTANEOUS
Status: DISCONTINUED | OUTPATIENT
Start: 2024-03-07 | End: 2024-03-09 | Stop reason: HOSPADM

## 2024-03-07 RX ORDER — ASPIRIN 300 MG/1
300 SUPPOSITORY RECTAL DAILY
Status: DISCONTINUED | OUTPATIENT
Start: 2024-03-07 | End: 2024-03-09 | Stop reason: HOSPADM

## 2024-03-07 RX ORDER — LANOLIN ALCOHOL/MO/W.PET/CERES
1000 CREAM (GRAM) TOPICAL DAILY
Status: DISCONTINUED | OUTPATIENT
Start: 2024-03-07 | End: 2024-03-09 | Stop reason: HOSPADM

## 2024-03-07 RX ORDER — NICOTINE POLACRILEX 4 MG
15 LOZENGE BUCCAL
Status: DISCONTINUED | OUTPATIENT
Start: 2024-03-07 | End: 2024-03-09 | Stop reason: HOSPADM

## 2024-03-07 RX ORDER — ASPIRIN 81 MG/1
81 TABLET, CHEWABLE ORAL DAILY
Status: DISCONTINUED | OUTPATIENT
Start: 2024-03-07 | End: 2024-03-09 | Stop reason: HOSPADM

## 2024-03-07 RX ADMIN — ASPIRIN 81 MG: 81 TABLET, CHEWABLE ORAL at 12:01

## 2024-03-07 RX ADMIN — Medication 10 ML: at 21:29

## 2024-03-07 RX ADMIN — CLOPIDOGREL BISULFATE 300 MG: 75 TABLET ORAL at 11:59

## 2024-03-07 RX ADMIN — ATORVASTATIN CALCIUM 80 MG: 40 TABLET, FILM COATED ORAL at 21:29

## 2024-03-07 RX ADMIN — Medication 1000 MCG: at 16:36

## 2024-03-07 RX ADMIN — IOPAMIDOL 115 ML: 755 INJECTION, SOLUTION INTRAVENOUS at 10:45

## 2024-03-07 RX ADMIN — Medication 10 ML: at 16:36
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
## 2024-03-07 NOTE — PROGRESS NOTES
Chief Complaint   Patient presents with    Hypertension       Subjective      Cj Cifuentes is a 73 y.o. who presents for not feeling well.  Patient reports in the last 12 to 18 hours he began feeling poorly, developed visual disturbance in the left eye along with abnormal sensation in the left face, and some difficulties walking.  Patient felt like he was staggering when he walked.  He has not had any falls.  The symptoms prompted him to check his blood pressure at home yesterday evening where blood pressure was in the 150s over 90s with use of a wrist cuff..  Patient's wife encouraged him to seek treatment today so he walks into the office to have his blood pressure checked.  Further evaluation proceeded once  patient made staff aware of his symptoms.  Patient has diabetes.  He has had elevated blood pressures in the past but with weight loss his hypertension resolved.    The following portions of the patient's history were reviewed and updated as appropriate: allergies, current medications, past family history, past medical history, past social history, past surgical history, and problem list.    Review of Systems    Objective   Vital Signs:  /80   Pulse 54   SpO2 94%     Initial blood pressure in office was 172 systolic.  After 5 minutes at rest patient's blood pressure was 162 systolic.        Physical Exam  Vitals reviewed.   Constitutional:       Appearance: Normal appearance.   Neck:      Vascular: No carotid bruit.   Cardiovascular:      Rate and Rhythm: Normal rate and regular rhythm.      Pulses: Normal pulses.      Heart sounds: Normal heart sounds.   Pulmonary:      Effort: Pulmonary effort is normal.      Breath sounds: Normal breath sounds.   Neurological:      Mental Status: He is alert.      Cranial Nerves: Cranial nerves 2-12 are intact.      Sensory: Sensation is intact.      Motor: Weakness present.      Coordination: Romberg sign negative.      Gait: Gait abnormal.      Deep Tendon  "Reflexes:      Reflex Scores:       Patellar reflexes are 2+ on the right side and 2+ on the left side.       Achilles reflexes are 2+ on the right side and 2+ on the left side.     Comments: Patient did have some abnormal findings on neuroexam.  He voiced decreased visual acuity in the left eye although on Snellen chart testing he had 20/30 vision in both eyes.  He continues to report however the vision out of the left eye was \"blurry\".  Cranial nerves were intact. patient had weakness with dorsiflexion of the right ankle.  Patient had impaired balance with pivoting requiring assistance to get up on the exam table.          Result Review   The following data was reviewed by: Gary Kaur MD on 03/07/2024:    Data reviewed : POCT glucose -- 136               Assessment and Plan  Diagnoses and all orders for this visit:    1. Cerebrovascular accident (CVA), unspecified mechanism (Primary)    2. Type 2 diabetes mellitus with hyperglycemia, without long-term current use of insulin  -     POCT Glucose    3. Essential hypertension    Plan: TIA versus CVA.  Due to abnormal neurologic symptoms and findings patient was instructed to go to the emergency room immediately.  Patient's wife will take him to [facility] for further evaluation.        Follow Up  No follow-ups on file.  Patient was given instructions and counseling regarding his condition or for health maintenance advice. Please see specific information pulled into the AVS if appropriate.       "
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
## 2024-03-07 NOTE — PLAN OF CARE
Goal Outcome Evaluation:  Plan of Care Reviewed With: patient, daughter                  Anticipated Discharge Disposition (SLP): No further SLP services warranted    SLP Diagnosis: functional speech/language skills, functional cognitive-linguistic skills (03/07/24 0510)
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
## 2024-03-07 NOTE — CASE MANAGEMENT/SOCIAL WORK
Discharge Planning Assessment  [facility]     Patient Name: Cj Cifuentes  MRN: [id number]  Today's Date: 3/7/2024    Admit Date: 3/7/2024    Plan: Home   Discharge Needs Assessment       Row Name 03/07/24 1141       Living Environment    People in Home spouse    Current Living Arrangements home    Primary Care Provided by self    Provides Primary Care For no one    Family Caregiver if Needed spouse    Family Caregiver Names Kassidy Peters, wife    Quality of Family Relationships helpful;involved;supportive    Able to Return to Prior Arrangements yes       Transition Planning    Patient/Family Anticipates Transition to home with family    Patient/Family Anticipated Services at Transition        Discharge Needs Assessment    Equipment Currently Used at Home cane, straight;walker, rolling    Concerns to be Addressed denies needs/concerns at this time    Discharge Coordination/Progress Lives in a house in [place] with his wife, independent with ADLs.  Has a cane and walker that he currently does not use.  No history of home health and no advanced directives.                   Discharge Plan       Row Name 03/07/24 1142       Plan    Plan Home    Patient/Family in Agreement with Plan yes    Plan Comments I spoke with Mr Peters and his family at bedside.  Mr Peters resides in a house in [place] with his wife and is independent with ADLs.  He has a cane and walker that he currently does not use.  He has not had home health in the past, and does not have an advanced directive.  His insurance is [employer], and there have not been any issues or lapses in coverage.  His insurance does cover his prescriptions.  His PCP is Gary Kaur, and he gets his prescriptions filled at [facility].  At this time, there are no discharge needs.    Final Discharge Disposition Code 01 - home or self-care                  Continued Care and Services - Admitted Since 3/7/2024    No active coordination  exists for this encounter.          Demographic Summary    No documentation.                  Functional Status       Row Name 03/07/24 1141       Functional Status    Usual Activity Tolerance good    Current Activity Tolerance good       Functional Status, IADL    Medications independent    Meal Preparation independent    Housekeeping independent    Laundry independent    Shopping independent       Mental Status    General Appearance WDL WDL                   Psychosocial    No documentation.                  Abuse/Neglect    No documentation.                  Legal    No documentation.                  Substance Abuse    No documentation.                  Patient Forms    No documentation.                     Celena Ruiz RN
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
## 2024-03-08 ENCOUNTER — APPOINTMENT (OUTPATIENT)
Dept: CARDIOLOGY | Facility: HOSPITAL | Age: 74
End: 2024-03-08
Payer: MEDICARE

## 2024-03-08 LAB
ANION GAP SERPL CALCULATED.3IONS-SCNC: 10 MMOL/L (ref 5–15)
BASOPHILS # BLD AUTO: 0.07 10*3/MM3 (ref 0–0.2)
BASOPHILS NFR BLD AUTO: 0.7 % (ref 0–1.5)
BH CV ECHO MEAS - AO MAX PG: 8.1 MMHG
BH CV ECHO MEAS - AO MEAN PG: 5 MMHG
BH CV ECHO MEAS - AO ROOT DIAM: 3.4 CM
BH CV ECHO MEAS - AO V2 MAX: 142 CM/SEC
BH CV ECHO MEAS - AO V2 VTI: 30.6 CM
BH CV ECHO MEAS - AVA(I,D): 2.33 CM2
BH CV ECHO MEAS - EDV(CUBED): 82.8 ML
BH CV ECHO MEAS - EDV(MOD-SP2): 95.4 ML
BH CV ECHO MEAS - EDV(MOD-SP4): 124 ML
BH CV ECHO MEAS - EF(MOD-BP): 56.9 %
BH CV ECHO MEAS - EF(MOD-SP2): 56.3 %
BH CV ECHO MEAS - EF(MOD-SP4): 56.9 %
BH CV ECHO MEAS - ESV(CUBED): 28.6 ML
BH CV ECHO MEAS - ESV(MOD-SP2): 41.7 ML
BH CV ECHO MEAS - ESV(MOD-SP4): 53.5 ML
BH CV ECHO MEAS - FS: 29.8 %
BH CV ECHO MEAS - IVS/LVPW: 0.95 CM
BH CV ECHO MEAS - IVSD: 0.97 CM
BH CV ECHO MEAS - LA DIMENSION: 3.7 CM
BH CV ECHO MEAS - LAT PEAK E' VEL: 7.7 CM/SEC
BH CV ECHO MEAS - LV MASS(C)D: 143.9 GRAMS
BH CV ECHO MEAS - LV MAX PG: 5.1 MMHG
BH CV ECHO MEAS - LV MEAN PG: 3.1 MMHG
BH CV ECHO MEAS - LV V1 MAX: 112.9 CM/SEC
BH CV ECHO MEAS - LV V1 VTI: 23.4 CM
BH CV ECHO MEAS - LVIDD: 4.4 CM
BH CV ECHO MEAS - LVIDS: 3.1 CM
BH CV ECHO MEAS - LVOT AREA: 3 CM2
BH CV ECHO MEAS - LVOT DIAM: 1.97 CM
BH CV ECHO MEAS - LVPWD: 1.02 CM
BH CV ECHO MEAS - MED PEAK E' VEL: 5.9 CM/SEC
BH CV ECHO MEAS - MV A MAX VEL: 96.7 CM/SEC
BH CV ECHO MEAS - MV DEC SLOPE: 172.8 CM/SEC2
BH CV ECHO MEAS - MV DEC TIME: 0.4 SEC
BH CV ECHO MEAS - MV E MAX VEL: 67 CM/SEC
BH CV ECHO MEAS - MV E/A: 0.69
BH CV ECHO MEAS - MV MAX PG: 3.6 MMHG
BH CV ECHO MEAS - MV MEAN PG: 1.26 MMHG
BH CV ECHO MEAS - MV P1/2T: 105.8 MSEC
BH CV ECHO MEAS - MV V2 VTI: 24.9 CM
BH CV ECHO MEAS - MVA(P1/2T): 2.08 CM2
BH CV ECHO MEAS - MVA(VTI): 2.9 CM2
BH CV ECHO MEAS - PA ACC TIME: 0.07 SEC
BH CV ECHO MEAS - SV(LVOT): 71.3 ML
BH CV ECHO MEAS - SV(MOD-SP2): 53.7 ML
BH CV ECHO MEAS - SV(MOD-SP4): 70.5 ML
BH CV ECHO MEAS - TAPSE (>1.6): 2.39 CM
BH CV ECHO MEASUREMENTS AVERAGE E/E' RATIO: 9.85
BH CV ECHO SHUNT ASSESSMENT PERFORMED (HIDDEN SCRIPTING): 1
BH CV VAS BP RIGHT ARM: NORMAL MMHG
BH CV XLRA - TDI S': 10.7 CM/SEC
BUN SERPL-MCNC: 12 MG/DL (ref 8–23)
BUN/CREAT SERPL: 14.5 (ref 7–25)
CALCIUM SPEC-SCNC: 8.9 MG/DL (ref 8.6–10.5)
CHLORIDE SERPL-SCNC: 100 MMOL/L (ref 98–107)
CHOLEST SERPL-MCNC: 161 MG/DL (ref 0–200)
CO2 SERPL-SCNC: 23 MMOL/L (ref 22–29)
CREAT SERPL-MCNC: 0.83 MG/DL (ref 0.76–1.27)
DEPRECATED RDW RBC AUTO: 40.6 FL (ref 37–54)
EGFRCR SERPLBLD CKD-EPI 2021: 93.6 ML/MIN/1.73
EOSINOPHIL # BLD AUTO: 0.22 10*3/MM3 (ref 0–0.4)
EOSINOPHIL NFR BLD AUTO: 2.3 % (ref 0.3–6.2)
ERYTHROCYTE [DISTWIDTH] IN BLOOD BY AUTOMATED COUNT: 12.8 % (ref 12.3–15.4)
GLUCOSE BLDC GLUCOMTR-MCNC: 120 MG/DL (ref 70–130)
GLUCOSE BLDC GLUCOMTR-MCNC: 128 MG/DL (ref 70–130)
GLUCOSE BLDC GLUCOMTR-MCNC: 149 MG/DL (ref 70–130)
GLUCOSE BLDC GLUCOMTR-MCNC: 159 MG/DL (ref 70–130)
GLUCOSE SERPL-MCNC: 157 MG/DL (ref 65–99)
HBA1C MFR BLD: 7 % (ref 4.8–5.6)
HCT VFR BLD AUTO: 42.4 % (ref 37.5–51)
HDLC SERPL-MCNC: 26 MG/DL (ref 40–60)
HGB BLD-MCNC: 14.7 G/DL (ref 13–17.7)
IMM GRANULOCYTES # BLD AUTO: 0.04 10*3/MM3 (ref 0–0.05)
IMM GRANULOCYTES NFR BLD AUTO: 0.4 % (ref 0–0.5)
LDLC SERPL CALC-MCNC: 90 MG/DL (ref 0–100)
LDLC/HDLC SERPL: 3.16 {RATIO}
LEFT ATRIUM VOLUME INDEX: 17.3 ML/M2
LYMPHOCYTES # BLD AUTO: 1.61 10*3/MM3 (ref 0.7–3.1)
LYMPHOCYTES NFR BLD AUTO: 16.9 % (ref 19.6–45.3)
MCH RBC QN AUTO: 29.9 PG (ref 26.6–33)
MCHC RBC AUTO-ENTMCNC: 34.7 G/DL (ref 31.5–35.7)
MCV RBC AUTO: 86.4 FL (ref 79–97)
MONOCYTES # BLD AUTO: 0.84 10*3/MM3 (ref 0.1–0.9)
MONOCYTES NFR BLD AUTO: 8.8 % (ref 5–12)
NEUTROPHILS NFR BLD AUTO: 6.73 10*3/MM3 (ref 1.7–7)
NEUTROPHILS NFR BLD AUTO: 70.9 % (ref 42.7–76)
NRBC BLD AUTO-RTO: 0 /100 WBC (ref 0–0.2)
PLATELET # BLD AUTO: 231 10*3/MM3 (ref 140–450)
PMV BLD AUTO: 10.7 FL (ref 6–12)
POTASSIUM SERPL-SCNC: 4.6 MMOL/L (ref 3.5–5.2)
RBC # BLD AUTO: 4.91 10*6/MM3 (ref 4.14–5.8)
SODIUM SERPL-SCNC: 133 MMOL/L (ref 136–145)
TRIGL SERPL-MCNC: 264 MG/DL (ref 0–150)
VLDLC SERPL-MCNC: 45 MG/DL (ref 5–40)
WBC NRBC COR # BLD AUTO: 9.51 10*3/MM3 (ref 3.4–10.8)

## 2024-03-08 PROCEDURE — 96374 THER/PROPH/DIAG INJ IV PUSH: CPT

## 2024-03-08 PROCEDURE — 25010000002 KETOROLAC TROMETHAMINE PER 15 MG: Performed by: FAMILY MEDICINE

## 2024-03-08 PROCEDURE — 97161 PT EVAL LOW COMPLEX 20 MIN: CPT

## 2024-03-08 PROCEDURE — 97165 OT EVAL LOW COMPLEX 30 MIN: CPT

## 2024-03-08 PROCEDURE — 80061 LIPID PANEL: CPT

## 2024-03-08 PROCEDURE — 93306 TTE W/DOPPLER COMPLETE: CPT | Performed by: INTERNAL MEDICINE

## 2024-03-08 PROCEDURE — 93306 TTE W/DOPPLER COMPLETE: CPT

## 2024-03-08 PROCEDURE — 99214 OFFICE O/P EST MOD 30 MIN: CPT | Performed by: STUDENT IN AN ORGANIZED HEALTH CARE EDUCATION/TRAINING PROGRAM

## 2024-03-08 PROCEDURE — 63710000001 INSULIN LISPRO (HUMAN) PER 5 UNITS: Performed by: STUDENT IN AN ORGANIZED HEALTH CARE EDUCATION/TRAINING PROGRAM

## 2024-03-08 PROCEDURE — 99232 SBSQ HOSP IP/OBS MODERATE 35: CPT | Performed by: FAMILY MEDICINE

## 2024-03-08 PROCEDURE — G0378 HOSPITAL OBSERVATION PER HR: HCPCS

## 2024-03-08 PROCEDURE — 80048 BASIC METABOLIC PNL TOTAL CA: CPT | Performed by: STUDENT IN AN ORGANIZED HEALTH CARE EDUCATION/TRAINING PROGRAM

## 2024-03-08 PROCEDURE — 83036 HEMOGLOBIN GLYCOSYLATED A1C: CPT

## 2024-03-08 PROCEDURE — 82948 REAGENT STRIP/BLOOD GLUCOSE: CPT

## 2024-03-08 PROCEDURE — 85025 COMPLETE CBC W/AUTO DIFF WBC: CPT | Performed by: STUDENT IN AN ORGANIZED HEALTH CARE EDUCATION/TRAINING PROGRAM

## 2024-03-08 PROCEDURE — 25010000002 SULFUR HEXAFLUORIDE MICROSPH 60.7-25 MG RECONSTITUTED SUSPENSION

## 2024-03-08 RX ORDER — ACETAMINOPHEN 500 MG
1000 TABLET ORAL ONCE
Status: COMPLETED | OUTPATIENT
Start: 2024-03-08 | End: 2024-03-08

## 2024-03-08 RX ORDER — KETOROLAC TROMETHAMINE 30 MG/ML
30 INJECTION, SOLUTION INTRAMUSCULAR; INTRAVENOUS ONCE
Status: COMPLETED | OUTPATIENT
Start: 2024-03-08 | End: 2024-03-08

## 2024-03-08 RX ORDER — CALCIUM CARBONATE 500 MG/1
2 TABLET, CHEWABLE ORAL ONCE
Status: COMPLETED | OUTPATIENT
Start: 2024-03-08 | End: 2024-03-08

## 2024-03-08 RX ADMIN — SULFUR HEXAFLUORIDE 2 ML: KIT at 11:45

## 2024-03-08 RX ADMIN — ACETAMINOPHEN 1000 MG: 500 TABLET ORAL at 11:33

## 2024-03-08 RX ADMIN — Medication 10 ML: at 20:15

## 2024-03-08 RX ADMIN — Medication 1000 MCG: at 10:07

## 2024-03-08 RX ADMIN — KETOROLAC TROMETHAMINE 30 MG: 30 INJECTION, SOLUTION INTRAMUSCULAR; INTRAVENOUS at 11:33

## 2024-03-08 RX ADMIN — Medication 10 ML: at 10:09

## 2024-03-08 RX ADMIN — INSULIN LISPRO 2 UNITS: 100 INJECTION, SOLUTION INTRAVENOUS; SUBCUTANEOUS at 20:32

## 2024-03-08 RX ADMIN — ASPIRIN 81 MG: 81 TABLET, CHEWABLE ORAL at 10:07

## 2024-03-08 RX ADMIN — CALCIUM CARBONATE (ANTACID) CHEW TAB 500 MG 2 TABLET: 500 CHEW TAB at 04:10

## 2024-03-08 RX ADMIN — CLOPIDOGREL BISULFATE 75 MG: 75 TABLET ORAL at 10:07

## 2024-03-08 RX ADMIN — ATORVASTATIN CALCIUM 80 MG: 40 TABLET, FILM COATED ORAL at 20:14
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
## 2024-03-08 NOTE — PLAN OF CARE
Goal Outcome Evaluation:  Plan of Care Reviewed With: patient        Progress: no change  Outcome Evaluation: Pt presents at baseline for ADL performance with symmetrical BUE strength and coordination/sensation intact. No visual deficits appreciated this morning, mild facial numbness persists. OT signing off, defer to PT for any further mobility needs. Rec d/c to home when medically appropriate.      Anticipated Discharge Disposition (OT): home
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
## 2024-03-08 NOTE — CONSULTS
Diabetes Education    Patient Name:  Cj Cifuentes  YOB: 1950  MRN: [id number]  Admit Date:  3/7/2024        Consult for diabetes education received. Chart reviewed. Pt was seen at bedside today. Permission given for visit.     Patient seen per stroke protocol. Patient had no specific questions about diabetes. Explained to patient that stroke can be a complication of uncontrolled diabetes. Patient is currently taking metformin and ozempic, no issues reported. Patient with free style jose alberto CGM monitor. Target blood sugar is 100-150, patients current numbers are 105-145 as reported by patient via CGM. Discussed treatment of hypoglycemia, patient reports understanding via teach back. Patient stated he rarely has issues with hyperglycemia. Current A1C is 7%.     Patient has been scheduled for outpatient diabetes education follow up visit on March 21 at 9:30AM via phone. Outpatient staff will provide reminder call prior to appointment. Patient was given reminder card with date and time of appointment and our contact information.     Total time spent reviewing chart, preparing education/materials, providing education at bedside, and coordinating care approx 30 minutes.    Thank you for consult.    Electronically signed by:  Malini Santo RN  03/08/24 14:23 EST

## 2024-03-08 NOTE — PROGRESS NOTES
[facility] Medicine Services  PROGRESS NOTE    Patient Name: Cj Cifuentes  : 1950  MRN: [id number]    Date of Admission: 3/7/2024  Primary Care Physician: Gary Kaur MD    Subjective   Subjective     CC:  Stroke symptoms    HPI:  Patient is a 73-year-old who presented to the emergency department yesterday with changes in his vision, dizziness and numbness of the face.  Stroke team was consulted.  MRI was negative for findings of acute infarct however Dr. Javad Camargo with stroke neurology believes he may have had a clinical CVA.  Workup still in progress.  Patient reports still having foggy vision of the left eye.  He still has a headache.  His peripheral vision does not seem to be affected on exam to gross deficits.      Objective   Objective     Vital Signs:   Temp:  [97.8 °F (36.6 °C)-98.3 °F (36.8 °C)] 98.3 °F (36.8 °C)  Heart Rate:  [67-89] 89  Resp:  [16-18] 18  BP: (143-154)/(83-95) 149/83  Flow (L/min):  [2] 2     Physical Exam:  Constitutional: No acute distress, awake, alert  HENT: NCAT, mucous membranes moist  Respiratory: Clear to auscultation bilaterally, respiratory effort normal   Cardiovascular: RRR, no murmurs, rubs, or gallops  Gastrointestinal: Positive bowel sounds, soft, nontender, nondistended  Musculoskeletal: No bilateral ankle edema  Psychiatric: Appropriate affect, cooperative  Neurologic: Oriented x 3, strength symmetric in all extremities, Cranial Nerves grossly intact to confrontation, speech clear  Skin: No rashes      Results Reviewed:  LAB RESULTS:      Lab 24  0506 24  1043 24  1042 24  1041   WBC 9.51  --   --  10.33   HEMOGLOBIN 14.7  --   --  15.7   HEMOGLOBIN, POC  --  15.6 15.6  --    HEMATOCRIT 42.4  --   --  44.9   HEMATOCRIT POC  --  46 46  --    PLATELETS 231  --   --  238   NEUTROS ABS 6.73  --   --  7.29*   IMMATURE GRANS (ABS) 0.04  --   --  0.04   LYMPHS ABS 1.61  --   --  1.94   MONOS ABS 0.84  --    --  0.80   EOS ABS 0.22  --   --  0.18   MCV 86.4  --   --  84.9   PROTIME  --   --  13.0 13.0   APTT  --   --   --  25.4         Lab 03/08/24  0506 03/07/24  1043 03/07/24  1042   SODIUM 133*  --   --    POTASSIUM 4.6  --   --    CHLORIDE 100  --   --    CO2 23.0  --   --    ANION GAP 10.0  --   --    BUN 12  --   --    CREATININE 0.83 0.90 0.90   EGFR 93.6  --  91.3   GLUCOSE 157*  --   --    CALCIUM 8.9  --   --    HEMOGLOBIN A1C 7.00*  --   --          Lab 03/07/24  1041   ALT (SGPT) 14   AST (SGOT) 14         Lab 03/07/24  1042 03/07/24  1041   HSTROP T  --  23*   PROTIME 13.0 13.0   INR 1 1.1         Lab 03/08/24  0506   CHOLESTEROL 161   LDL CHOL 90   HDL CHOL 26*   TRIGLYCERIDES 264*             Brief Urine Lab Results  (Last result in the past 365 days)        Color   Clarity   Blood   Leuk Est   Nitrite   Protein   CREAT   Urine HCG        07/11/23 1428             200 mg/dL                 Microbiology Results Abnormal       None            Adult Transthoracic Echo Complete W/ Cont if Necessary Per Protocol (With Agitated Saline)    Result Date: 3/8/2024    Left ventricular systolic function is normal. Calculated left ventricular EF = 56.9% Normal left ventricular cavity size and wall thickness noted. All left ventricular wall segments contract normally. Left ventricular diastolic function is consistent with (grade I) impaired relaxation.   Normal left atrial size and volume noted. Saline test results are negative.   The aortic valve is structurally normal with no regurgitation or stenosis present.   The mitral valve is structurally normal with no significant stenosis present. Trace mitral valve regurgitation is present.     MRI Brain Without Contrast    Result Date: 3/7/2024  MRI BRAIN WO CONTRAST Date of Exam: 3/7/2024 7:01 PM EST Indication: Stroke, follow up.  Comparison: CT evaluation performed on the same date Technique:  Routine multiplanar/multisequence sequence images of the brain were obtained  without contrast administration. Findings: No area of restricted diffusion is identified to suggest an acute intracranial infarct. Multifocal areas of T2/FLAIR signal increase are noted within the subcortical, deep cerebral, and periventricular white matter consistent with chronic small  vessel/microangiopathic ischemic changes. The ventricles and sulci are normal in size and configuration. No intracranial mass or mass effect. No extra-axial mass or collection. The posterior fossa is normal. Sellar and suprasellar structures are normal.  The major intracranial flow-voids of the [place] are patent. Orbital and periorbital soft tissues are normal. The mucosal thickening of the left maxillary sinus. The mastoid air cells are aerated..     Impression: Impression: No acute intracranial pathology. Chronic small vessel/microangiopathic ischemic changes. Electronically Signed: Brant Alfonso MD  3/7/2024 7:31 PM EST  Workstation ID: [id number]    XR Chest 1 View    Result Date: 3/7/2024  XR CHEST 1 VW Date of Exam: 3/7/2024 11:00 AM EST Indication: Acute Stroke Protocol (onset < 12 hrs) Comparison: None available. Findings: Cardiomediastinal silhouette is unremarkable.  No airspace disease, pneumothorax, nor pleural effusion. There are degenerative changes of the shoulders and thoracic spine.     Impression: Impression: No acute cardiopulmonary abnormality. Electronically Signed: Le Millan MD  3/7/2024 11:19 AM EST  Workstation ID: [id number]    CT Angiogram Head w AI Analysis of LVO    Result Date: 3/7/2024  CT CEREBRAL PERFUSION W WO CONTRAST, CT ANGIOGRAM NECK, CT ANGIOGRAM HEAD W AI ANALYSIS OF LVO Date of Exam: 3/7/2024 10:40 AM EST Indication: Neuro deficit, acute stroke suspected.  Comparison: Concurrent noncontrast head CT Technique: Axial CT images of the brain were obtained prior to and after the administration of 115 mL Isovue-370. Core blood volume, core blood flow, mean transit time, and Tmax images  were obtained utilizing the Rapid software protocol. A limited CT angiogram of the head was also performed to measure the blood vessel density. CTA of the head and neck was performed after the uneventful intravenous administration of above contrast. Reconstructed coronal and sagittal images were also obtained. In addition, a 3-D volume rendered image was created for interpretation. Automated exposure control and iterative reconstruction methods were used. The radiation dose reduction device was turned on for each scan per the ALARA (As Low as Reasonably Achievable) protocol. Findings: CT cerebral perfusion: Grossly normal and symmetric cerebral perfusion without core infarct or ischemic tissue at risk. CTA HEAD: No abrupt cut off/large vessel occlusion, flow-limiting stenosis, dissection, or aneurysm. There is mild dolichoectasia of the basilar tip. The cerebral veins and dural venous sinuses are grossly patent. CTA NECK: No abrupt cut off/large vessel occlusion, flow-limiting stenosis, dissection, or aneurysm. No appreciable atherosclerosis. Extravascular findings: There is a peribronchovascular 4 mm micronodule in the right upper lobe (series 10 image 33). Questionable 3 mm peribronchovascular nodule in the left upper lobe (series 10 image 35). Otherwise unremarkable appearance of the lung apices. Homogeneous attenuation of the thyroid gland. No pharyngeal or laryngeal mass lesion. No cervical adenopathy. The patient is edentulous. No acute or suspicious bony findings. There is degenerative disc disease in the mid and lower cervical spine.     Impression: Impression: No core infarct or ischemic tissue at risk. No abrupt cut off/large vessel occlusion, flow-limiting stenosis, dissection, or aneurysm. There are couple sub--6 mm micronodules in the lung apices. Recommend comparison with an outside study for evaluation of temporal stability. If not available, per Fleischner Society guidelines for incidentally found  multiple solid nodules measuring 6 mm and less, no follow-up is needed if the patient is at low risk for lung cancer. If the patient is at high-risk at for lung cancer, then a 12 month low-dose follow-up CT can be considered. Electronically Signed: Brayan Bingham MD  3/7/2024 11:12 AM EST  Workstation ID: [id number]    CT CEREBRAL PERFUSION WITH & WITHOUT CONTRAST    Result Date: 3/7/2024  CT CEREBRAL PERFUSION W WO CONTRAST, CT ANGIOGRAM NECK, CT ANGIOGRAM HEAD W AI ANALYSIS OF LVO Date of Exam: 3/7/2024 10:40 AM EST Indication: Neuro deficit, acute stroke suspected.  Comparison: Concurrent noncontrast head CT Technique: Axial CT images of the brain were obtained prior to and after the administration of 115 mL Isovue-370. Core blood volume, core blood flow, mean transit time, and Tmax images were obtained utilizing the Rapid software protocol. A limited CT angiogram of the head was also performed to measure the blood vessel density. CTA of the head and neck was performed after the uneventful intravenous administration of above contrast. Reconstructed coronal and sagittal images were also obtained. In addition, a 3-D volume rendered image was created for interpretation. Automated exposure control and iterative reconstruction methods were used. The radiation dose reduction device was turned on for each scan per the ALARA (As Low as Reasonably Achievable) protocol. Findings: CT cerebral perfusion: Grossly normal and symmetric cerebral perfusion without core infarct or ischemic tissue at risk. CTA HEAD: No abrupt cut off/large vessel occlusion, flow-limiting stenosis, dissection, or aneurysm. There is mild dolichoectasia of the basilar tip. The cerebral veins and dural venous sinuses are grossly patent. CTA NECK: No abrupt cut off/large vessel occlusion, flow-limiting stenosis, dissection, or aneurysm. No appreciable atherosclerosis. Extravascular findings: There is a peribronchovascular 4 mm micronodule in the right  upper lobe (series 10 image 33). Questionable 3 mm peribronchovascular nodule in the left upper lobe (series 10 image 35). Otherwise unremarkable appearance of the lung apices. Homogeneous attenuation of the thyroid gland. No pharyngeal or laryngeal mass lesion. No cervical adenopathy. The patient is edentulous. No acute or suspicious bony findings. There is degenerative disc disease in the mid and lower cervical spine.     Impression: Impression: No core infarct or ischemic tissue at risk. No abrupt cut off/large vessel occlusion, flow-limiting stenosis, dissection, or aneurysm. There are couple sub--6 mm micronodules in the lung apices. Recommend comparison with an outside study for evaluation of temporal stability. If not available, per Fleischner Society guidelines for incidentally found multiple solid nodules measuring 6 mm and less, no follow-up is needed if the patient is at low risk for lung cancer. If the patient is at high-risk at for lung cancer, then a 12 month low-dose follow-up CT can be considered. Electronically Signed: Brayan Bingham MD  3/7/2024 11:12 AM EST  Workstation ID: [id number]    CT Angiogram Neck    Result Date: 3/7/2024  CT CEREBRAL PERFUSION W WO CONTRAST, CT ANGIOGRAM NECK, CT ANGIOGRAM HEAD W AI ANALYSIS OF LVO Date of Exam: 3/7/2024 10:40 AM EST Indication: Neuro deficit, acute stroke suspected.  Comparison: Concurrent noncontrast head CT Technique: Axial CT images of the brain were obtained prior to and after the administration of 115 mL Isovue-370. Core blood volume, core blood flow, mean transit time, and Tmax images were obtained utilizing the Rapid software protocol. A limited CT angiogram of the head was also performed to measure the blood vessel density. CTA of the head and neck was performed after the uneventful intravenous administration of above contrast. Reconstructed coronal and sagittal images were also obtained. In addition, a 3-D volume rendered image was created for  interpretation. Automated exposure control and iterative reconstruction methods were used. The radiation dose reduction device was turned on for each scan per the ALARA (As Low as Reasonably Achievable) protocol. Findings: CT cerebral perfusion: Grossly normal and symmetric cerebral perfusion without core infarct or ischemic tissue at risk. CTA HEAD: No abrupt cut off/large vessel occlusion, flow-limiting stenosis, dissection, or aneurysm. There is mild dolichoectasia of the basilar tip. The cerebral veins and dural venous sinuses are grossly patent. CTA NECK: No abrupt cut off/large vessel occlusion, flow-limiting stenosis, dissection, or aneurysm. No appreciable atherosclerosis. Extravascular findings: There is a peribronchovascular 4 mm micronodule in the right upper lobe (series 10 image 33). Questionable 3 mm peribronchovascular nodule in the left upper lobe (series 10 image 35). Otherwise unremarkable appearance of the lung apices. Homogeneous attenuation of the thyroid gland. No pharyngeal or laryngeal mass lesion. No cervical adenopathy. The patient is edentulous. No acute or suspicious bony findings. There is degenerative disc disease in the mid and lower cervical spine.     Impression: Impression: No core infarct or ischemic tissue at risk. No abrupt cut off/large vessel occlusion, flow-limiting stenosis, dissection, or aneurysm. There are couple sub--6 mm micronodules in the lung apices. Recommend comparison with an outside study for evaluation of temporal stability. If not available, per Fleischner Society guidelines for incidentally found multiple solid nodules measuring 6 mm and less, no follow-up is needed if the patient is at low risk for lung cancer. If the patient is at high-risk at for lung cancer, then a 12 month low-dose follow-up CT can be considered. Electronically Signed: Brayan Bingham MD  3/7/2024 11:12 AM EST  Workstation ID: [id number]    CT Head Without Contrast Stroke Protocol    Result  Date: 3/7/2024  CT HEAD WO CONTRAST STROKE PROTOCOL Date of Exam: 3/7/2024 10:31 AM EST Indication: Neuro deficit, acute, stroke suspected Neuro deficit, acute stroke suspected. Comparison: None available. Technique: Axial CT images were obtained of the head without contrast administration.  Reconstructed coronal images were also obtained. Automated exposure control and iterative construction methods were used. Scan Time: 1034 Results discussed with Dr. Perla at 1038, 3/7/2024. Findings: The calvarium appears intact. Included paranasal sinuses and mastoids appear clear. No gross abnormalities are seen in the orbits. There is expected degree of generalized cerebral atrophy for the patient's age. There is no evidence of acute infarction, edema, hemorrhage, hydrocephalus, mass or mass effect, or abnormal extra-axial collection.     Impression: Impression: Age-appropriate generalized cerebral atrophy. No evidence of acute intracranial disease. Electronically Signed: Amadeo Sanders MD  3/7/2024 10:44 AM EST  Workstation ID: [id number]     Results for orders placed during the hospital encounter of 03/07/24    Adult Transthoracic Echo Complete W/ Cont if Necessary Per Protocol (With Agitated Saline)    Interpretation Summary    Left ventricular systolic function is normal. Calculated left ventricular EF = 56.9% Normal left ventricular cavity size and wall thickness noted. All left ventricular wall segments contract normally. Left ventricular diastolic function is consistent with (grade I) impaired relaxation.    Normal left atrial size and volume noted. Saline test results are negative.    The aortic valve is structurally normal with no regurgitation or stenosis present.    The mitral valve is structurally normal with no significant stenosis present. Trace mitral valve regurgitation is present.      Current medications:  Scheduled Meds:aspirin, 81 mg, Oral, Daily   Or  aspirin, 300 mg, Rectal, Daily  atorvastatin, 80 mg, Oral,  Nightly  clopidogrel, 75 mg, Oral, Daily  insulin lispro, 2-7 Units, Subcutaneous, 4x Daily AC & at Bedtime  sodium chloride, 10 mL, Intravenous, Q12H  sodium chloride, 10 mL, Intravenous, Q12H  vitamin B-12, 1,000 mcg, Oral, Daily      Continuous Infusions:niCARdipine, 5-15 mg/hr      PRN Meds:.  senna-docusate sodium **AND** polyethylene glycol **AND** bisacodyl **AND** bisacodyl    dextrose    dextrose    glucagon (human recombinant)    sodium chloride    sodium chloride    sodium chloride    sodium chloride    Assessment & Plan   Assessment & Plan     Active Hospital Problems    Diagnosis  POA    **Visual changes [H53.9]  Yes    Essential hypertension [I10]  Yes    Type 2 diabetes mellitus with hyperglycemia, without long-term current use of insulin [E11.65]  Yes    Class 2 severe obesity due to excess calories with serious comorbidity and body mass index (BMI) of 38.0 to 38.9 in adult [E66.01, Z68.38]  Not Applicable      Resolved Hospital Problems   No resolved problems to display.        Brief Hospital Course to date:  Cj Cifuentes is a 73 y.o. male with past medical history of diabetes, hypertension, hypothyroidism, arthritis, chronic neck pain who was admitted with strokelike symptoms including dizziness, facial numbness and visual disturbance, neurology stroke team feels clinically consistent with CVA although MRI did not show acute infarct.    Acute CVA  Left facial paresthesia  Headache  -Neurology stroke team feel symptoms are consistent with acute clinical CVA  -Continued workup per stroke team  -PT OT and speech therapy    Hypertension  -Allowing permissive hypertension until cleared by stroke team    Diabetes mellitus  -Continue insulin as needed per sliding scale        Expected Discharge Location and Transportation: Home  Expected Discharge   Expected Discharge Date: 3/11/2024; Expected Discharge Time:      DVT prophylaxis:  Mechanical DVT prophylaxis orders are present.         AM-PAC 6 Clicks  Score (PT): 19 (03/08/24 1151)    CODE STATUS:   Code Status and Medical Interventions:   Ordered at: 03/07/24 1340     Level Of Support Discussed With:    Patient     Code Status (Patient has no pulse and is not breathing):    CPR (Attempt to Resuscitate)     Medical Interventions (Patient has pulse or is breathing):    Full Support       Charlette Vega MD  03/08/24

## 2024-03-08 NOTE — PLAN OF CARE
Goal Outcome Evaluation:  Plan of Care Reviewed With: patient, spouse           Outcome Evaluation: Pt. presents below baseline function w/generalized weakness and balance deficits affecting his ability to safely participate in functional mobility. He performed transfers and ambulated 50' w/contact guard assist. Activity limited by fatigue. Pt. would benefit from IPPT to address stated deficits.      Anticipated Discharge Disposition (PT): home with assist, home with outpatient therapy services

## 2024-03-08 NOTE — PROGRESS NOTES
Stroke Progress Note       Chief Complaint:  left sided weakness and left vision issues    Subjective    Subjective     Subjective:  Patient feels weak on his left leg  He states that he has lazy eye on the left    Review of Systems   Constitutional: No fatigue  HENT: Negative for nosebleeds and rhinorrhea.    Eyes: Negative for redness.   Respiratory: Negative for cough.    Gastrointestinal: Negative for anal bleeding.   Endocrine: Negative for polydipsia.   Genitourinary: Negative for enuresis and urgency.   Musculoskeletal: Negative for joint swelling.   Neurological: Negative for tremors.   Psychiatric/Behavioral: Negative for hallucinations.      Objective      Temp:  [97.8 °F (36.6 °C)-98.3 °F (36.8 °C)] 98.3 °F (36.8 °C)  Heart Rate:  [67-89] 87  Resp:  [16-18] 18  BP: (143-154)/(83-95) 149/83          NEURO  MENTAL STATUS: AAOx3, memory intact, fund of knowledge appropriate    LANG/SPEECH: Naming and repetition intact, fluent, follows 3-step commands    CRANIAL NERVES:    Pupils equal and reactive, EOMI intact, no gaze deviation, no nystagmus  No facial droop, cough and gag intact, shoulder shrug intact, tongue midline     MOTOR:  Moves all extremities equally except for left lower extremity     SENSORY: dec sensation on the left     COORDINATION: Normal finger to nose and heel to shin, no tremor, no dysmetria    STATION: Not assessed due to patient condition    GAIT: Not assessed due to patient condition     Results Review:    I reviewed the patient's new clinical results.    Lab Results (last 24 hours)       Procedure Component Value Units Date/Time    POC Glucose Once [[id number]]  (Normal) Collected: 03/08/24 1707    Specimen: Blood Updated: 03/08/24 1709     Glucose 120 mg/dL     POC Glucose Once [[id number]]  (Normal) Collected: 03/08/24 1123    Specimen: Blood Updated: 03/08/24 1124     Glucose 128 mg/dL     POC Glucose Once [[id number]]  (Abnormal) Collected: 03/08/24 0748    Specimen: Blood Updated:  03/08/24 0750     Glucose 149 mg/dL     Hemoglobin A1c [[id number]]  (Abnormal) Collected: 03/08/24 0506    Specimen: Blood Updated: 03/08/24 0643     Hemoglobin A1C 7.00 %     Narrative:      Hemoglobin A1C Ranges:    Increased Risk for Diabetes  5.7% to 6.4%  Diabetes                     >= 6.5%  Diabetic Goal                < 7.0%    CBC Auto Differential [[id number]]  (Abnormal) Collected: 03/08/24 0506    Specimen: Blood Updated: 03/08/24 0637     WBC 9.51 10*3/mm3      RBC 4.91 10*6/mm3      Hemoglobin 14.7 g/dL      Hematocrit 42.4 %      MCV 86.4 fL      MCH 29.9 pg      MCHC 34.7 g/dL      RDW 12.8 %      RDW-SD 40.6 fl      MPV 10.7 fL      Platelets 231 10*3/mm3      Neutrophil % 70.9 %      Lymphocyte % 16.9 %      Monocyte % 8.8 %      Eosinophil % 2.3 %      Basophil % 0.7 %      Immature Grans % 0.4 %      Neutrophils, Absolute 6.73 10*3/mm3      Lymphocytes, Absolute 1.61 10*3/mm3      Monocytes, Absolute 0.84 10*3/mm3      Eosinophils, Absolute 0.22 10*3/mm3      Basophils, Absolute 0.07 10*3/mm3      Immature Grans, Absolute 0.04 10*3/mm3      nRBC 0.0 /100 WBC     Lipid Panel [[id number]]  (Abnormal) Collected: 03/08/24 0506    Specimen: Blood Updated: 03/08/24 0604     Total Cholesterol 161 mg/dL      Triglycerides 264 mg/dL      HDL Cholesterol 26 mg/dL      LDL Cholesterol  90 mg/dL      VLDL Cholesterol 45 mg/dL      LDL/HDL Ratio 3.16    Narrative:      Cholesterol Reference Ranges  (U.S. Department of Health and Human Services ATP III Classifications)    Desirable          <200 mg/dL  Borderline High    200-239 mg/dL  High Risk          >240 mg/dL      Triglyceride Reference Ranges  (U.S. Department of Health and Human Services ATP III Classifications)    Normal           <150 mg/dL  Borderline High  150-199 mg/dL  High             200-499 mg/dL  Very High        >500 mg/dL    HDL Reference Ranges  (U.S. Department of Health and Human Services ATP III Classifications)    Low     <40 mg/dl  (major risk factor for CHD)  High    >60 mg/dl ('negative' risk factor for CHD)        LDL Reference Ranges  (U.S. Department of Health and Human Services ATP III Classifications)    Optimal          <100 mg/dL  Near Optimal     100-129 mg/dL  Borderline High  130-159 mg/dL  High             160-189 mg/dL  Very High        >189 mg/dL    Basic Metabolic Panel [[id number]]  (Abnormal) Collected: 03/08/24 0506    Specimen: Blood Updated: 03/08/24 0604     Glucose 157 mg/dL      BUN 12 mg/dL      Creatinine 0.83 mg/dL      Sodium 133 mmol/L      Potassium 4.6 mmol/L      Chloride 100 mmol/L      CO2 23.0 mmol/L      Calcium 8.9 mg/dL      BUN/Creatinine Ratio 14.5     Anion Gap 10.0 mmol/L      eGFR 93.6 mL/min/1.73     Narrative:      GFR Normal >60  Chronic Kidney Disease <60  Kidney Failure <15    The GFR formula is only valid for adults with stable renal function between ages 18 and 70.    POC Glucose Once [[id number]]  (Normal) Collected: 03/07/24 2042    Specimen: Blood Updated: 03/07/24 2044     Glucose 116 mg/dL           MRI Brain Without Contrast    Result Date: 3/7/2024  Impression: No acute intracranial pathology. Chronic small vessel/microangiopathic ischemic changes. Electronically Signed: Brant Alfonso MD  3/7/2024 7:31 PM EST  Workstation ID: [id number]    XR Chest 1 View    Result Date: 3/7/2024  Impression: No acute cardiopulmonary abnormality. Electronically Signed: Le Millan MD  3/7/2024 11:19 AM EST  Workstation ID: [id number]    CT Angiogram Head w AI Analysis of LVO    Result Date: 3/7/2024  Impression: No core infarct or ischemic tissue at risk. No abrupt cut off/large vessel occlusion, flow-limiting stenosis, dissection, or aneurysm. There are couple sub--6 mm micronodules in the lung apices. Recommend comparison with an outside study for evaluation of temporal stability. If not available, per Fleischner Society guidelines for incidentally found multiple solid nodules measuring 6 mm and less, no  follow-up is needed if the patient is at low risk for lung cancer. If the patient is at high-risk at for lung cancer, then a 12 month low-dose follow-up CT can be considered. Electronically Signed: Brayan Bingham MD  3/7/2024 11:12 AM EST  Workstation ID: [id number]    CT CEREBRAL PERFUSION WITH & WITHOUT CONTRAST    Result Date: 3/7/2024  Impression: No core infarct or ischemic tissue at risk. No abrupt cut off/large vessel occlusion, flow-limiting stenosis, dissection, or aneurysm. There are couple sub--6 mm micronodules in the lung apices. Recommend comparison with an outside study for evaluation of temporal stability. If not available, per Fleischner Society guidelines for incidentally found multiple solid nodules measuring 6 mm and less, no follow-up is needed if the patient is at low risk for lung cancer. If the patient is at high-risk at for lung cancer, then a 12 month low-dose follow-up CT can be considered. Electronically Signed: Brayan Bingham MD  3/7/2024 11:12 AM EST  Workstation ID: [id number]    CT Angiogram Neck    Result Date: 3/7/2024  Impression: No core infarct or ischemic tissue at risk. No abrupt cut off/large vessel occlusion, flow-limiting stenosis, dissection, or aneurysm. There are couple sub--6 mm micronodules in the lung apices. Recommend comparison with an outside study for evaluation of temporal stability. If not available, per Fleischner Society guidelines for incidentally found multiple solid nodules measuring 6 mm and less, no follow-up is needed if the patient is at low risk for lung cancer. If the patient is at high-risk at for lung cancer, then a 12 month low-dose follow-up CT can be considered. Electronically Signed: Brayan Bingham MD  3/7/2024 11:12 AM EST  Workstation ID: [id number]    CT Head Without Contrast Stroke Protocol    Result Date: 3/7/2024  Impression: Age-appropriate generalized cerebral atrophy. No evidence of acute intracranial disease. Electronically Signed: Amadeo  MD Tommy  3/7/2024 10:44 AM EST  Workstation ID: [id number]   Results for orders placed during the hospital encounter of 03/07/24    Adult Transthoracic Echo Complete W/ Cont if Necessary Per Protocol (With Agitated Saline)    Interpretation Summary    Left ventricular systolic function is normal. Calculated left ventricular EF = 56.9% Normal left ventricular cavity size and wall thickness noted. All left ventricular wall segments contract normally. Left ventricular diastolic function is consistent with (grade I) impaired relaxation.    Normal left atrial size and volume noted. Saline test results are negative.    The aortic valve is structurally normal with no regurgitation or stenosis present.    The mitral valve is structurally normal with no significant stenosis present. Trace mitral valve regurgitation is present.            Assessment/Plan     Assessment/Plan:    Diffusion negative, clinical stroke- localizable to right cerebral hemisphere  -patient with uncontrolled vascular risk factors - likely secondary to small vessel disease  -DAPT for 21 days followed by aspirin full dose   -to observe one more day - can discharge tomorrow     Hypertension- normal goals   Hyperlipidemia- continue lipitor 80 daily  T2DM w- goal A1C less than 7  Sleep apnea- educated on compliance of CPAP  Multiple solid nodules -lung- follow up CT chest in 12 months/primary care follow up.     Discussed signs and symptoms of stroke and when to call 911. Instructed to follow a low fat diet including the Mediterranean diet. Instructed to take all medications daily as prescribed. Encouraged 30 minutes of exercise 3-4 times a week as tolerated. Stay well hydrated. Discussed potential side effects of new medications and signs and symptoms to report.   Patient  verbalizes understanding and agrees with plan.        Pantera Desir MD  03/08/24  17:16 EST

## 2024-03-09 ENCOUNTER — READMISSION MANAGEMENT (OUTPATIENT)
Dept: CALL CENTER | Facility: HOSPITAL | Age: 74
End: 2024-03-09
Payer: MEDICARE

## 2024-03-09 VITALS
DIASTOLIC BLOOD PRESSURE: 90 MMHG | HEIGHT: 69 IN | OXYGEN SATURATION: 92 % | WEIGHT: 253.53 LBS | SYSTOLIC BLOOD PRESSURE: 136 MMHG | BODY MASS INDEX: 37.55 KG/M2 | TEMPERATURE: 98.5 F | RESPIRATION RATE: 17 BRPM | HEART RATE: 85 BPM

## 2024-03-09 PROBLEM — R09.89 SUSPECTED CEREBROVASCULAR ACCIDENT (CVA): Status: ACTIVE | Noted: 2024-03-09

## 2024-03-09 PROBLEM — G45.9 TRANSIENT ISCHEMIC ATTACK (TIA): Status: ACTIVE | Noted: 2024-03-09

## 2024-03-09 LAB
GLUCOSE BLDC GLUCOMTR-MCNC: 130 MG/DL (ref 70–130)
GLUCOSE BLDC GLUCOMTR-MCNC: 152 MG/DL (ref 70–130)

## 2024-03-09 PROCEDURE — 82948 REAGENT STRIP/BLOOD GLUCOSE: CPT

## 2024-03-09 PROCEDURE — 63710000001 INSULIN LISPRO (HUMAN) PER 5 UNITS: Performed by: STUDENT IN AN ORGANIZED HEALTH CARE EDUCATION/TRAINING PROGRAM

## 2024-03-09 PROCEDURE — 99239 HOSP IP/OBS DSCHRG MGMT >30: CPT | Performed by: FAMILY MEDICINE

## 2024-03-09 PROCEDURE — 99213 OFFICE O/P EST LOW 20 MIN: CPT | Performed by: NURSE PRACTITIONER

## 2024-03-09 PROCEDURE — G0378 HOSPITAL OBSERVATION PER HR: HCPCS

## 2024-03-09 RX ORDER — ATORVASTATIN CALCIUM 80 MG/1
80 TABLET, FILM COATED ORAL NIGHTLY
Qty: 90 TABLET | Refills: 0 | Status: SHIPPED | OUTPATIENT
Start: 2024-03-09 | End: 2024-06-07

## 2024-03-09 RX ORDER — ASPIRIN 325 MG
325 TABLET, DELAYED RELEASE (ENTERIC COATED) ORAL DAILY
Qty: 30 TABLET | Refills: 2 | Status: SHIPPED | OUTPATIENT
Start: 2024-03-31 | End: 2024-06-29

## 2024-03-09 RX ORDER — CLOPIDOGREL BISULFATE 75 MG/1
75 TABLET ORAL DAILY
Qty: 21 TABLET | Refills: 0 | Status: SHIPPED | OUTPATIENT
Start: 2024-03-10 | End: 2024-03-31

## 2024-03-09 RX ORDER — ASPIRIN 81 MG/1
81 TABLET, CHEWABLE ORAL DAILY
Qty: 21 TABLET | Refills: 0 | Status: SHIPPED | OUTPATIENT
Start: 2024-03-10 | End: 2024-03-31

## 2024-03-09 RX ADMIN — Medication 10 ML: at 08:11

## 2024-03-09 RX ADMIN — CLOPIDOGREL BISULFATE 75 MG: 75 TABLET ORAL at 08:10

## 2024-03-09 RX ADMIN — Medication 1000 MCG: at 08:10

## 2024-03-09 RX ADMIN — ASPIRIN 81 MG: 81 TABLET, CHEWABLE ORAL at 08:10

## 2024-03-09 RX ADMIN — INSULIN LISPRO 2 UNITS: 100 INJECTION, SOLUTION INTRAVENOUS; SUBCUTANEOUS at 08:12

## 2024-03-09 NOTE — DISCHARGE INSTRUCTIONS
Continue taking aspirin 81 mg and plavix 75 mg through 3/27.  Beginning on 3/28 stop taking plavix and increase aspirin to 325 mg daily.  Continue taking atorvastatin 80 mg daily.

## 2024-03-09 NOTE — OUTREACH NOTE
Prep Survey      Flowsheet Row Responses   [facility] facility patient discharged from? [facility]   Is LACE score < 7 ? Yes   Eligibility [facility]   Date of Admission 03/07/24   Date of Discharge 03/09/24   Discharge Disposition Home or Self Care   Discharge diagnosis CVA   Does the patient have one of the following disease processes/diagnoses(primary or secondary)? Stroke   Does the patient have Home health ordered? No   Is there a DME ordered? No   Prep survey completed? Yes            DAYDAY MENDOZA - Registered Nurse

## 2024-03-09 NOTE — DISCHARGE SUMMARY
[facility] Medicine Services  DISCHARGE SUMMARY    Patient Name: Cj Cifuentes  : 1950  MRN: [id number]    Date of Admission: 3/7/2024 10:27 AM  Date of Discharge:  24    Primary Care Physician: Gary Kaur MD    Consults       Date and Time Order Name Status Description    3/7/2024 10:31 AM Inpatient Neurology Consult Stroke              Hospital Course     Presenting Problem: visual changes    Active Hospital Problems    Diagnosis  POA    **Suspected cerebrovascular accident (CVA) [R09.89]  Yes    Transient ischemic attack (TIA) [G45.9]  Yes    Visual changes [H53.9]  Yes    Essential hypertension [I10]  Yes    Type 2 diabetes mellitus with hyperglycemia, without long-term current use of insulin [E11.65]  Yes    Class 2 severe obesity due to excess calories with serious comorbidity and body mass index (BMI) of 38.0 to 38.9 in adult [E66.01, Z68.38]  Not Applicable      Resolved Hospital Problems   No resolved problems to display.          Hospital Course:  3/8 - Cj Cifuentes is a 73 y.o. male who presented to the emergency department  with changes in his vision, dizziness and numbness of the face.  Stroke team was consulted.  MRI was negative for findings of acute infarct however Dr. Javad Camargo with stroke neurology believes he may have had a clinical CVA.  Patient reports still having foggy vision of the left eye.  He still has a headache.  His peripheral vision does not seem to be affected on exam to gross deficits.     3/9 - PT states his symptoms have largely resolved and he no longer has heaviness in the left side of his face. Headache is also resolved. Alonzo po.     Acute CVA vs TIA  Left facial paresthesia  Headache  -Neurology stroke team feel symptoms are consistent with acute clinical CVA  -PT OT and speech therapy ok for home  --DAPT x 21 days then 325mg asa.   --Lipitor 80mg     Hypertension  -resume home meds at dc     Diabetes  mellitus  -resume home meds at [place]  --A1C was 7.0      Discharge Follow Up Recommendations for outpatient labs/diagnostics:   PCP 1 week to review med changes.   Stroke clinic 4 weeks    Day of Discharge     HPI:   Pt feeling much better. Headache has resolved    Review of Systems  No fever, no cp, no soa    Vital Signs:   Temp:  [98 °F (36.7 °C)-98.5 °F (36.9 °C)] 98.5 °F (36.9 °C)  Heart Rate:  [66-89] 85  Resp:  [17-18] 17  BP: (125-159)/(80-95) 136/90  Flow (L/min):  [2] 2      Physical Exam:  Constitutional: No acute distress, awake, alert  HENT: NCAT, mucous membranes moist  Respiratory: Clear to auscultation bilaterally, respiratory effort normal   Cardiovascular: RRR  Gastrointestinal: Positive bowel sounds, soft, nontender, nondistended  Musculoskeletal: No bilateral ankle edema  Psychiatric: Appropriate affect, cooperative  Neurologic: Oriented x 3, strength symmetric in all extremities, Cranial Nerves grossly intact to confrontation, speech clear  Skin: No rashes      Pertinent  and/or Most Recent Results     LAB RESULTS:      Lab 03/08/24  0506 03/07/24  1043 03/07/24  1042 03/07/24  1041   WBC 9.51  --   --  10.33   HEMOGLOBIN 14.7  --   --  15.7   HEMOGLOBIN, POC  --  15.6 15.6  --    HEMATOCRIT 42.4  --   --  44.9   HEMATOCRIT POC  --  46 46  --    PLATELETS 231  --   --  238   NEUTROS ABS 6.73  --   --  7.29*   IMMATURE GRANS (ABS) 0.04  --   --  0.04   LYMPHS ABS 1.61  --   --  1.94   MONOS ABS 0.84  --   --  0.80   EOS ABS 0.22  --   --  0.18   MCV 86.4  --   --  84.9   PROTIME  --   --  13.0 13.0   APTT  --   --   --  25.4         Lab 03/08/24  0506 03/07/24  1043 03/07/24  1042   SODIUM 133*  --   --    POTASSIUM 4.6  --   --    CHLORIDE 100  --   --    CO2 23.0  --   --    ANION GAP 10.0  --   --    BUN 12  --   --    CREATININE 0.83 0.90 0.90   EGFR 93.6  --  91.3   GLUCOSE 157*  --   --    CALCIUM 8.9  --   --    HEMOGLOBIN A1C 7.00*  --   --          Lab 03/07/24  1041   ALT (SGPT) 14   AST  (SGOT) 14         Lab 03/07/24  1042 03/07/24  1041   HSTROP T  --  23*   PROTIME 13.0 13.0   INR 1 1.1         Lab 03/08/24  0506   CHOLESTEROL 161   LDL CHOL 90   HDL CHOL 26*   TRIGLYCERIDES 264*             Brief Urine Lab Results  (Last result in the past 365 days)        Color   Clarity   Blood   Leuk Est   Nitrite   Protein   CREAT   Urine HCG        07/11/23 1428             200 mg/dL               Microbiology Results (last 10 days)       ** No results found for the last 240 hours. **            Adult Transthoracic Echo Complete W/ Cont if Necessary Per Protocol (With Agitated Saline)    Result Date: 3/8/2024    Left ventricular systolic function is normal. Calculated left ventricular EF = 56.9% Normal left ventricular cavity size and wall thickness noted. All left ventricular wall segments contract normally. Left ventricular diastolic function is consistent with (grade I) impaired relaxation.   Normal left atrial size and volume noted. Saline test results are negative.   The aortic valve is structurally normal with no regurgitation or stenosis present.   The mitral valve is structurally normal with no significant stenosis present. Trace mitral valve regurgitation is present.     MRI Brain Without Contrast    Result Date: 3/7/2024  MRI BRAIN WO CONTRAST Date of Exam: 3/7/2024 7:01 PM EST Indication: Stroke, follow up.  Comparison: CT evaluation performed on the same date Technique:  Routine multiplanar/multisequence sequence images of the brain were obtained without contrast administration. Findings: No area of restricted diffusion is identified to suggest an acute intracranial infarct. Multifocal areas of T2/FLAIR signal increase are noted within the subcortical, deep cerebral, and periventricular white matter consistent with chronic small  vessel/microangiopathic ischemic changes. The ventricles and sulci are normal in size and configuration. No intracranial mass or mass effect. No extra-axial mass or  collection. The posterior fossa is normal. Sellar and suprasellar structures are normal.  The major intracranial flow-voids of the [place] are patent. Orbital and periorbital soft tissues are normal. The mucosal thickening of the left maxillary sinus. The mastoid air cells are aerated..     Impression: No acute intracranial pathology. Chronic small vessel/microangiopathic ischemic changes. Electronically Signed: Brant Alfonso MD  3/7/2024 7:31 PM EST  Workstation ID: [id number]    XR Chest 1 View    Result Date: 3/7/2024  XR CHEST 1 VW Date of Exam: 3/7/2024 11:00 AM EST Indication: Acute Stroke Protocol (onset < 12 hrs) Comparison: None available. Findings: Cardiomediastinal silhouette is unremarkable.  No airspace disease, pneumothorax, nor pleural effusion. There are degenerative changes of the shoulders and thoracic spine.     Impression: No acute cardiopulmonary abnormality. Electronically Signed: Le Millan MD  3/7/2024 11:19 AM EST  Workstation ID: [id number]    CT Angiogram Head w AI Analysis of LVO    Result Date: 3/7/2024  CT CEREBRAL PERFUSION W WO CONTRAST, CT ANGIOGRAM NECK, CT ANGIOGRAM HEAD W AI ANALYSIS OF LVO Date of Exam: 3/7/2024 10:40 AM EST Indication: Neuro deficit, acute stroke suspected.  Comparison: Concurrent noncontrast head CT Technique: Axial CT images of the brain were obtained prior to and after the administration of 115 mL Isovue-370. Core blood volume, core blood flow, mean transit time, and Tmax images were obtained utilizing the Rapid software protocol. A limited CT angiogram of the head was also performed to measure the blood vessel density. CTA of the head and neck was performed after the uneventful intravenous administration of above contrast. Reconstructed coronal and sagittal images were also obtained. In addition, a 3-D volume rendered image was created for interpretation. Automated exposure control and iterative reconstruction methods were used. The radiation dose  reduction device was turned on for each scan per the ALARA (As Low as Reasonably Achievable) protocol. Findings: CT cerebral perfusion: Grossly normal and symmetric cerebral perfusion without core infarct or ischemic tissue at risk. CTA HEAD: No abrupt cut off/large vessel occlusion, flow-limiting stenosis, dissection, or aneurysm. There is mild dolichoectasia of the basilar tip. The cerebral veins and dural venous sinuses are grossly patent. CTA NECK: No abrupt cut off/large vessel occlusion, flow-limiting stenosis, dissection, or aneurysm. No appreciable atherosclerosis. Extravascular findings: There is a peribronchovascular 4 mm micronodule in the right upper lobe (series 10 image 33). Questionable 3 mm peribronchovascular nodule in the left upper lobe (series 10 image 35). Otherwise unremarkable appearance of the lung apices. Homogeneous attenuation of the thyroid gland. No pharyngeal or laryngeal mass lesion. No cervical adenopathy. The patient is edentulous. No acute or suspicious bony findings. There is degenerative disc disease in the mid and lower cervical spine.     Impression: No core infarct or ischemic tissue at risk. No abrupt cut off/large vessel occlusion, flow-limiting stenosis, dissection, or aneurysm. There are couple sub--6 mm micronodules in the lung apices. Recommend comparison with an outside study for evaluation of temporal stability. If not available, per Fleischner Society guidelines for incidentally found multiple solid nodules measuring 6 mm and less, no follow-up is needed if the patient is at low risk for lung cancer. If the patient is at high-risk at for lung cancer, then a 12 month low-dose follow-up CT can be considered. Electronically Signed: Brayan Bingham MD  3/7/2024 11:12 AM EST  Workstation ID: [id number]    CT CEREBRAL PERFUSION WITH & WITHOUT CONTRAST    Result Date: 3/7/2024  CT CEREBRAL PERFUSION W WO CONTRAST, CT ANGIOGRAM NECK, CT ANGIOGRAM HEAD W AI ANALYSIS OF LVO Date  of Exam: 3/7/2024 10:40 AM EST Indication: Neuro deficit, acute stroke suspected.  Comparison: Concurrent noncontrast head CT Technique: Axial CT images of the brain were obtained prior to and after the administration of 115 mL Isovue-370. Core blood volume, core blood flow, mean transit time, and Tmax images were obtained utilizing the Rapid software protocol. A limited CT angiogram of the head was also performed to measure the blood vessel density. CTA of the head and neck was performed after the uneventful intravenous administration of above contrast. Reconstructed coronal and sagittal images were also obtained. In addition, a 3-D volume rendered image was created for interpretation. Automated exposure control and iterative reconstruction methods were used. The radiation dose reduction device was turned on for each scan per the ALARA (As Low as Reasonably Achievable) protocol. Findings: CT cerebral perfusion: Grossly normal and symmetric cerebral perfusion without core infarct or ischemic tissue at risk. CTA HEAD: No abrupt cut off/large vessel occlusion, flow-limiting stenosis, dissection, or aneurysm. There is mild dolichoectasia of the basilar tip. The cerebral veins and dural venous sinuses are grossly patent. CTA NECK: No abrupt cut off/large vessel occlusion, flow-limiting stenosis, dissection, or aneurysm. No appreciable atherosclerosis. Extravascular findings: There is a peribronchovascular 4 mm micronodule in the right upper lobe (series 10 image 33). Questionable 3 mm peribronchovascular nodule in the left upper lobe (series 10 image 35). Otherwise unremarkable appearance of the lung apices. Homogeneous attenuation of the thyroid gland. No pharyngeal or laryngeal mass lesion. No cervical adenopathy. The patient is edentulous. No acute or suspicious bony findings. There is degenerative disc disease in the mid and lower cervical spine.     Impression: No core infarct or ischemic tissue at risk. No abrupt  cut off/large vessel occlusion, flow-limiting stenosis, dissection, or aneurysm. There are couple sub--6 mm micronodules in the lung apices. Recommend comparison with an outside study for evaluation of temporal stability. If not available, per Fleischner Society guidelines for incidentally found multiple solid nodules measuring 6 mm and less, no follow-up is needed if the patient is at low risk for lung cancer. If the patient is at high-risk at for lung cancer, then a 12 month low-dose follow-up CT can be considered. Electronically Signed: Brayan Bingham MD  3/7/2024 11:12 AM EST  Workstation ID: [id number]    CT Angiogram Neck    Result Date: 3/7/2024  CT CEREBRAL PERFUSION W WO CONTRAST, CT ANGIOGRAM NECK, CT ANGIOGRAM HEAD W AI ANALYSIS OF LVO Date of Exam: 3/7/2024 10:40 AM EST Indication: Neuro deficit, acute stroke suspected.  Comparison: Concurrent noncontrast head CT Technique: Axial CT images of the brain were obtained prior to and after the administration of 115 mL Isovue-370. Core blood volume, core blood flow, mean transit time, and Tmax images were obtained utilizing the Rapid software protocol. A limited CT angiogram of the head was also performed to measure the blood vessel density. CTA of the head and neck was performed after the uneventful intravenous administration of above contrast. Reconstructed coronal and sagittal images were also obtained. In addition, a 3-D volume rendered image was created for interpretation. Automated exposure control and iterative reconstruction methods were used. The radiation dose reduction device was turned on for each scan per the ALARA (As Low as Reasonably Achievable) protocol. Findings: CT cerebral perfusion: Grossly normal and symmetric cerebral perfusion without core infarct or ischemic tissue at risk. CTA HEAD: No abrupt cut off/large vessel occlusion, flow-limiting stenosis, dissection, or aneurysm. There is mild dolichoectasia of the basilar tip. The cerebral  veins and dural venous sinuses are grossly patent. CTA NECK: No abrupt cut off/large vessel occlusion, flow-limiting stenosis, dissection, or aneurysm. No appreciable atherosclerosis. Extravascular findings: There is a peribronchovascular 4 mm micronodule in the right upper lobe (series 10 image 33). Questionable 3 mm peribronchovascular nodule in the left upper lobe (series 10 image 35). Otherwise unremarkable appearance of the lung apices. Homogeneous attenuation of the thyroid gland. No pharyngeal or laryngeal mass lesion. No cervical adenopathy. The patient is edentulous. No acute or suspicious bony findings. There is degenerative disc disease in the mid and lower cervical spine.     Impression: No core infarct or ischemic tissue at risk. No abrupt cut off/large vessel occlusion, flow-limiting stenosis, dissection, or aneurysm. There are couple sub--6 mm micronodules in the lung apices. Recommend comparison with an outside study for evaluation of temporal stability. If not available, per Fleischner Society guidelines for incidentally found multiple solid nodules measuring 6 mm and less, no follow-up is needed if the patient is at low risk for lung cancer. If the patient is at high-risk at for lung cancer, then a 12 month low-dose follow-up CT can be considered. Electronically Signed: Brayan Bingham MD  3/7/2024 11:12 AM EST  Workstation ID: [id number]    CT Head Without Contrast Stroke Protocol    Result Date: 3/7/2024  CT HEAD WO CONTRAST STROKE PROTOCOL Date of Exam: 3/7/2024 10:31 AM EST Indication: Neuro deficit, acute, stroke suspected Neuro deficit, acute stroke suspected. Comparison: None available. Technique: Axial CT images were obtained of the head without contrast administration.  Reconstructed coronal images were also obtained. Automated exposure control and iterative construction methods were used. Scan Time: 1034 Results discussed with Dr. Perla at 1038, 3/7/2024. Findings: The calvarium appears  intact. Included paranasal sinuses and mastoids appear clear. No gross abnormalities are seen in the orbits. There is expected degree of generalized cerebral atrophy for the patient's age. There is no evidence of acute infarction, edema, hemorrhage, hydrocephalus, mass or mass effect, or abnormal extra-axial collection.     Impression: Age-appropriate generalized cerebral atrophy. No evidence of acute intracranial disease. Electronically Signed: Amadeo Sanders MD  3/7/2024 10:44 AM EST  Workstation ID: [id number]             Results for orders placed during the hospital encounter of 03/07/24    Adult Transthoracic Echo Complete W/ Cont if Necessary Per Protocol (With Agitated Saline)    Interpretation Summary    Left ventricular systolic function is normal. Calculated left ventricular EF = 56.9% Normal left ventricular cavity size and wall thickness noted. All left ventricular wall segments contract normally. Left ventricular diastolic function is consistent with (grade I) impaired relaxation.    Normal left atrial size and volume noted. Saline test results are negative.    The aortic valve is structurally normal with no regurgitation or stenosis present.    The mitral valve is structurally normal with no significant stenosis present. Trace mitral valve regurgitation is present.      Plan for Follow-up of Pending Labs/Results: nothing pending    Discharge Details        Discharge Medications        New Medications        Instructions Start Date   aspirin 81 MG chewable tablet   81 mg, Oral, Daily   Start Date: March 10, 2024     aspirin 325 MG EC tablet   325 mg, Oral, Daily, To start on 3/31/24   Start Date: March 31, 2024     clopidogrel 75 MG tablet  Commonly known as: PLAVIX   75 mg, Oral, Daily   Start Date: March 10, 2024            Changes to Medications        Instructions Start Date   atorvastatin 80 MG tablet  Commonly known as: LIPITOR  What changed:   medication strength  how much to take   80 mg, Oral,  Nightly             Continue These Medications        Instructions Start Date   FreeStyle Kera 2 [place] device   No dose, route, or frequency recorded.      FreeStyle Kera 2 Sensor misc   1 each, Does not apply, Every 14 Days      metFORMIN 500 MG tablet  Commonly known as: Glucophage   1,000 mg, Oral, Daily With Breakfast      Ozempic (1 MG/DOSE) 4 MG/3ML solution pen-injector  Generic drug: Semaglutide (1 MG/DOSE)   1 mg, Subcutaneous, Weekly      vitamin B-12 1000 MCG tablet  Commonly known as: CYANOCOBALAMIN   1,000 mcg, Oral, Daily               No Known Allergies      Discharge Disposition:  Home or Self Care    Diet:  Hospital:  Diet Order   Procedures    Diet: Diabetic, Cardiac; Healthy Heart (2-3 Na+); Consistent Carbohydrate; Fluid Consistency: Thin (IDDSI 0)       Diet Instructions       Diet: Cardiac Diets, Diabetic Diets; Healthy Heart (2-3 Na+); Thin (IDDSI 0); Consistent Carbohydrate      Discharge Diet:  Cardiac Diets  Diabetic Diets       Cardiac Diet: Healthy Heart (2-3 Na+)    Fluid Consistency: Thin (IDDSI 0)    Diabetic Diet: Consistent Carbohydrate             Activity:  Activity Instructions       Activity as Tolerated              Restrictions or Other Recommendations:  none       CODE STATUS:    Code Status and Medical Interventions:   Ordered at: 03/07/24 1340     Level Of Support Discussed With:    Patient     Code Status (Patient has no pulse and is not breathing):    CPR (Attempt to Resuscitate)     Medical Interventions (Patient has pulse or is breathing):    Full Support       Future Appointments   Date Time Provider Department Center   3/21/2024  9:30 AM [facility]   7/30/2024  8:00 AM Gary Kaur MD [facility]   12/17/2024  8:40 AM Tuyet Stinson MD [facility]   2/4/2025  9:15 AM Gary Kaur MD [facility]       Additional Instructions for the Follow-ups that You Need to Schedule       Discharge Follow-up with PCP   As directed        Currently Documented PCP:    Gary Kaur MD    PCP Phone Number:    [phone]     Follow Up Details: 1 week discuss med changes and possible cva                      Charlette Vega MD  03/09/24      Time Spent on Discharge:  I spent  32  minutes on this discharge activity which included: face-to-face encounter with the patient, reviewing the data in the system, coordination of the care with the nursing staff as well as consultants, documentation, and entering orders.
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
## 2024-03-11 ENCOUNTER — TRANSITIONAL CARE MANAGEMENT TELEPHONE ENCOUNTER (OUTPATIENT)
Dept: CALL CENTER | Facility: HOSPITAL | Age: 74
End: 2024-03-11
Payer: MEDICARE

## 2024-03-11 ENCOUNTER — OFFICE VISIT (OUTPATIENT)
Dept: FAMILY MEDICINE CLINIC | Facility: CLINIC | Age: 74
End: 2024-03-11
Payer: MEDICARE

## 2024-03-11 VITALS
DIASTOLIC BLOOD PRESSURE: 85 MMHG | SYSTOLIC BLOOD PRESSURE: 150 MMHG | WEIGHT: 255.6 LBS | TEMPERATURE: 97.3 F | HEIGHT: 69 IN | BODY MASS INDEX: 37.86 KG/M2 | OXYGEN SATURATION: 97 % | RESPIRATION RATE: 20 BRPM | HEART RATE: 82 BPM

## 2024-03-11 DIAGNOSIS — E11.65 TYPE 2 DIABETES MELLITUS WITH HYPERGLYCEMIA, WITHOUT LONG-TERM CURRENT USE OF INSULIN: ICD-10-CM

## 2024-03-11 DIAGNOSIS — G47.34 NOCTURNAL HYPOXIA: ICD-10-CM

## 2024-03-11 DIAGNOSIS — I10 ESSENTIAL HYPERTENSION: ICD-10-CM

## 2024-03-11 DIAGNOSIS — I51.89 DIASTOLIC DYSFUNCTION WITHOUT HEART FAILURE: ICD-10-CM

## 2024-03-11 DIAGNOSIS — R06.81 WITNESSED EPISODE OF APNEA: ICD-10-CM

## 2024-03-11 DIAGNOSIS — I63.9 CEREBROVASCULAR ACCIDENT (CVA), UNSPECIFIED MECHANISM: Primary | ICD-10-CM

## 2024-03-11 NOTE — PROGRESS NOTES
Transitional Care Follow Up Visit  Subjective     Cj Cifuentes is a 73 y.o. male who presents for a transitional care management visit.    Within 48 business hours after discharge patient presented for his follow-up appointment    I reviewed and discussed the discharge summary, hospital problems, inpatient lab results, inpatient diagnostic studies, and consultation reports with Cj.     Current outpatient and discharge medications have been reconciled for the patient.  Reviewed by: Gary Kaur MD          3/9/2024     4:29 PM   Date of TCM Phone Call   [facility]   Date of Admission 3/7/2024   Date of Discharge 3/9/2024   Discharge Disposition Home or Self Care     Risk for Readmission (LACE) Score: 6 (3/9/2024  6:00 AM)      History of Present Illness   Course During Hospital Stay: Patient been seen in this office on March 7 and due to concerns over CVA had been referred immediately to [facility].  Patient underwent rapid evaluation.  Imaging did not reveal any focal areas of ischemia however stroke service indicated patient had a clinical CVA.  Patient tells me that the morning following his admission he may have had more of a left-sided facial droop.  When he presented to the office.  Regardless his symptoms overall improved and patient feels like he is back to baseline.  Patient is on clopidogrel 75 mg daily and aspirin 81 mg daily for the next 21 days.  After 21 days he will transition to aspirin 325 mg daily    A1c was 7 during hospitalization    He is to schedule follow-up with neurology service    Echocardiogram showed grade 1 diastolic dysfunction with normal LV systolic function.    Blood pressure remains elevated here in office.    Patient also was told he would need follow-up evaluation for sleep apnea.  Patient was awakened 1 night during hospitalization as staff applied oxygen due to hypoxia.  It was also reported patient had witnessed apnea.     The following  portions of the patient's history were reviewed and updated as appropriate: allergies, current medications, past family history, past medical history, past social history, past surgical history, and problem list.    Review of Systems    Objective   Physical Exam  Vitals reviewed.   Constitutional:       Appearance: Normal appearance.   Neck:      Vascular: No carotid bruit.   Cardiovascular:      Rate and Rhythm: Normal rate and regular rhythm.      Pulses: Normal pulses.      Heart sounds: Normal heart sounds.   Pulmonary:      Effort: Pulmonary effort is normal.   Neurological:      Mental Status: He is alert.      Motor: Weakness present.      Comments: Patient has minor residual right ankle weakness with dorsiflexion.  This symptom has improved compared to March 7         Assessment & Plan   Diagnoses and all orders for this visit:    1. Cerebrovascular accident (CVA), unspecified mechanism (Primary)  Comments:  Continue DAPT for 21 days then transition to aspirin 325 mg.  RTO 3 weeks  Orders:  -     Ambulatory Referral to Sleep Medicine    2. Witnessed episode of apnea  Comments:  Refer to sleep medicine  Orders:  -     Ambulatory Referral to Sleep Medicine    3. Nocturnal hypoxia  -     Ambulatory Referral to Sleep Medicine    4. Type 2 diabetes mellitus with hyperglycemia, without long-term current use of insulin  Assessment & Plan:  Stable. A1c at goal. Add Jardiance 10 mg for multiple benefits. Monitor for hypoglycemia.  Should Jardiance be unaffordable or cause hypoglycemia further hypoglycemic medications will not be prescribed.  Patient will be placed on an ACE or an ARB at that time to address his blood pressure and diastolic dysfunction    Orders:  -     empagliflozin (JARDIANCE) 10 MG tablet tablet; Take 1 tablet by mouth Daily.  Dispense: 30 tablet; Refill: 1    5. Essential hypertension  Assessment & Plan:  Hypertension is borderline  Blood pressure will be reassessed 3 weeks.    Jardiance 10 mg  will be started with close monitoring of both blood pressure and blood sugar.  Patient may may need addition of ACE or ARB to reach blood pressure goal of 130/80      6. Diastolic dysfunction without heart failure  Assessment & Plan:  New problem.  SGLT2 medication will be started.  As long as patient does not develop hypoglycemia or medication is not affordable Empagliflozin is preferable to address multiple conditions (diastolic dysfunction, hypoglycemia, hypertension)    Orders:  -     empagliflozin (JARDIANCE) 10 MG tablet tablet; Take 1 tablet by mouth Daily.  Dispense: 30 tablet; Refill: 1

## 2024-03-11 NOTE — OUTREACH NOTE
Call Center TCM Note      Flowsheet Row Responses   [facility] patient discharged from? [place]   Does the patient have one of the following disease processes/diagnoses(primary or secondary)? Stroke   TCM attempt successful? Yes   TCM call completed? Yes  [PCP office appt today, no call needed.]   Wrap up additional comments PCP Dr Kaur. Patient completed PCP office appt today. This appt within 2 business days of discharge, fulfills TCM requirement, no call needed.            Sary Cardona RN    3/11/2024, 14:15 EDT

## 2024-03-11 NOTE — ASSESSMENT & PLAN NOTE
New problem.  SGLT2 medication will be started.  As long as patient does not develop hypoglycemia or medication is not affordable Empagliflozin is preferable to address multiple conditions (diastolic dysfunction, hypoglycemia, hypertension)

## 2024-03-11 NOTE — ASSESSMENT & PLAN NOTE
Stable. A1c at goal. Add Jardiance 10 mg for multiple benefits. Monitor for hypoglycemia.  Should Jardiance be unaffordable or cause hypoglycemia further hypoglycemic medications will not be prescribed.  Patient will be placed on an ACE or an ARB at that time to address his blood pressure and diastolic dysfunction

## 2024-03-11 NOTE — ASSESSMENT & PLAN NOTE
Hypertension is borderline  Blood pressure will be reassessed 3 weeks.    Jardiance 10 mg will be started with close monitoring of both blood pressure and blood sugar.  Patient may may need addition of ACE or ARB to reach blood pressure goal of 130/80

## 2024-03-15 ENCOUNTER — TELEPHONE (OUTPATIENT)
Dept: FAMILY MEDICINE CLINIC | Facility: CLINIC | Age: 74
End: 2024-03-15
Payer: MEDICARE

## 2024-03-15 DIAGNOSIS — I51.89 DIASTOLIC DYSFUNCTION WITHOUT HEART FAILURE: ICD-10-CM

## 2024-03-15 DIAGNOSIS — E11.65 TYPE 2 DIABETES MELLITUS WITH HYPERGLYCEMIA, WITHOUT LONG-TERM CURRENT USE OF INSULIN: ICD-10-CM

## 2024-03-15 RX ORDER — SEMAGLUTIDE 1.34 MG/ML
1 INJECTION, SOLUTION SUBCUTANEOUS WEEKLY
Qty: 3 ML | Refills: 6 | Status: SHIPPED | OUTPATIENT
Start: 2024-03-15

## 2024-03-15 NOTE — TELEPHONE ENCOUNTER
Caller: Cj Cifuentes    Relationship: Self    Best call back number: [phone]     Which medication are you concerned about: JARDIANCE AND OZEMPIC    Who prescribed you this medication: DR HUDSON    When did you start taking this medication: TUESDAY, MARCH 12,24 STARTED JARDIANCE AND WAS TAKING OZEMPIC BEFORE THEN    What are your concerns: ASKING IF HE SHOULD BE TAKING BOTH. WANTS TO MAKE SURE THEY DON'T COUNTERACT.    How long have you had these concerns: TODAY. WOKE UP WITH A STOMACH ACHE AND AFTER EATING HE STILL HAS THE STOMACH ACHE.     PLEASE CALL TO ADVISE IF HE SHOULD BE TAKING BOTH MEDICATIONS TOGETHER.

## 2024-03-20 ENCOUNTER — READMISSION MANAGEMENT (OUTPATIENT)
Dept: CALL CENTER | Facility: HOSPITAL | Age: 74
End: 2024-03-20
Payer: MEDICARE

## 2024-03-20 NOTE — OUTREACH NOTE
Stroke Week 2 Survey      Flowsheet Row Responses   [facility] facility patient discharged from? [place]   Does the patient have one of the following disease processes/diagnoses(primary or secondary)? Stroke   Week 2 attempt successful? No   Unsuccessful attempts Attempt 1            FLACO DE PAZ - Registered Nurse

## 2024-03-21 ENCOUNTER — HOSPITAL ENCOUNTER (OUTPATIENT)
Dept: DIABETES SERVICES | Facility: HOSPITAL | Age: 74
Setting detail: RECURRING SERIES
Discharge: HOME | End: 2024-03-21
Payer: MEDICARE

## 2024-03-25 ENCOUNTER — READMISSION MANAGEMENT (OUTPATIENT)
Dept: CALL CENTER | Facility: HOSPITAL | Age: 74
End: 2024-03-25
Payer: MEDICARE

## 2024-03-25 NOTE — OUTREACH NOTE
Stroke Week 2 Survey      Flowsheet Row Responses   [facility] patient discharged from? [place]   Does the patient have one of the following disease processes/diagnoses(primary or secondary)? Stroke   Week 2 attempt successful? No   Unsuccessful attempts Attempt 2            BLOSSOM RODRIGUEZ - Registered Nurse

## 2024-03-26 ENCOUNTER — TELEPHONE (OUTPATIENT)
Dept: NEUROLOGY | Facility: CLINIC | Age: 74
End: 2024-03-26
Payer: MEDICARE

## 2024-04-01 ENCOUNTER — OFFICE VISIT (OUTPATIENT)
Dept: FAMILY MEDICINE CLINIC | Facility: CLINIC | Age: 74
End: 2024-04-01
Payer: MEDICARE

## 2024-04-01 VITALS
TEMPERATURE: 97.8 F | DIASTOLIC BLOOD PRESSURE: 78 MMHG | SYSTOLIC BLOOD PRESSURE: 130 MMHG | WEIGHT: 253 LBS | OXYGEN SATURATION: 95 % | HEART RATE: 79 BPM | BODY MASS INDEX: 37.47 KG/M2 | RESPIRATION RATE: 20 BRPM | HEIGHT: 69 IN

## 2024-04-01 DIAGNOSIS — I51.89 DIASTOLIC DYSFUNCTION WITHOUT HEART FAILURE: ICD-10-CM

## 2024-04-01 DIAGNOSIS — Z12.11 SCREENING FOR COLON CANCER: ICD-10-CM

## 2024-04-01 DIAGNOSIS — J02.9 ACUTE PHARYNGITIS, UNSPECIFIED ETIOLOGY: ICD-10-CM

## 2024-04-01 DIAGNOSIS — E11.65 TYPE 2 DIABETES MELLITUS WITH HYPERGLYCEMIA, WITHOUT LONG-TERM CURRENT USE OF INSULIN: ICD-10-CM

## 2024-04-01 DIAGNOSIS — I63.9 CEREBROVASCULAR ACCIDENT (CVA), UNSPECIFIED MECHANISM: Primary | ICD-10-CM

## 2024-04-01 PROBLEM — G45.9 TRANSIENT ISCHEMIC ATTACK (TIA): Status: RESOLVED | Noted: 2024-03-09 | Resolved: 2024-04-01

## 2024-04-01 PROBLEM — R09.89 SUSPECTED CEREBROVASCULAR ACCIDENT (CVA): Status: RESOLVED | Noted: 2024-03-09 | Resolved: 2024-04-01

## 2024-04-01 RX ORDER — AMOXICILLIN 500 MG/1
1000 CAPSULE ORAL 2 TIMES DAILY
Qty: 28 CAPSULE | Refills: 0 | Status: SHIPPED | OUTPATIENT
Start: 2024-04-01

## 2024-04-01 RX ORDER — ATORVASTATIN CALCIUM 80 MG/1
80 TABLET, FILM COATED ORAL NIGHTLY
Qty: 90 TABLET | Refills: 3 | Status: SHIPPED | OUTPATIENT
Start: 2024-04-01 | End: 2025-03-27

## 2024-04-01 RX ORDER — ASPIRIN 325 MG
325 TABLET, DELAYED RELEASE (ENTERIC COATED) ORAL DAILY
Qty: 90 TABLET | Refills: 3 | Status: SHIPPED | OUTPATIENT
Start: 2024-04-01

## 2024-04-01 NOTE — PROGRESS NOTES
"Chief Complaint   Patient presents with    FU on BP     sinus congestion, PND, sore throat      Started two days ago        Subjective      Cj Cifuentes is a 73 y.o. who presents for follow-up of hypertension and recent CVA.  Patient has been monitoring blood pressures at home and reports systolics between 125 and 135 and diastolics between 68 and 80.  He denies any recurrence of unilateral weakness or visual disturbance.  With the addition of Jardiance he has not had any hypoglycemic events.  Today was his last day of aspirin 81 mg and Plavix 75 mg.    In addition patient has 2 to 3 days of URI symptoms with nasal congestion, thick postnasal drainage, green nasal discharge and cough.  He does not believe he has had fevers    The following portions of the patient's history were reviewed and updated as appropriate: allergies, current medications, past family history, past medical history, past social history, past surgical history, and problem list.    Review of Systems    Objective   Vital Signs:  /78   Pulse 79   Temp 97.8 °F (36.6 °C)   Resp 20   Ht 175.3 cm (69.02\")   Wt 115 kg (253 lb)   SpO2 95%   BMI 37.34 kg/m²               Physical Exam  Vitals reviewed.   Constitutional:       Appearance: Normal appearance.   HENT:      Head: Normocephalic.      Right Ear: Tympanic membrane normal.      Left Ear: Tympanic membrane normal.      Mouth/Throat:      Mouth: Mucous membranes are moist.      Pharynx: Posterior oropharyngeal erythema present. No oropharyngeal exudate.   Eyes:      Pupils: Pupils are equal, round, and reactive to light.   Cardiovascular:      Rate and Rhythm: Normal rate and regular rhythm.      Pulses: Normal pulses.      Heart sounds: Normal heart sounds.   Pulmonary:      Effort: Pulmonary effort is normal.      Breath sounds: Normal breath sounds.   Musculoskeletal:      Cervical back: Normal range of motion. No rigidity.   Lymphadenopathy:      Cervical: No cervical " adenopathy.   Neurological:      Mental Status: He is alert.          Result Review                     Assessment and Plan  Diagnoses and all orders for this visit:    1. Cerebrovascular accident (CVA), unspecified mechanism (Primary)  Overview:  March 2024    Orders:  -     aspirin 325 MG EC tablet; Take 1 tablet by mouth Daily. To start on 3/31/24  Dispense: 90 tablet; Refill: 3    2. Type 2 diabetes mellitus with hyperglycemia, without long-term current use of insulin  -     empagliflozin (JARDIANCE) 10 MG tablet tablet; Take 1 tablet by mouth Daily.  Dispense: 30 tablet; Refill: 4  -     metFORMIN (Glucophage) 500 MG tablet; Take 2 tablets by mouth Daily With Breakfast.  Dispense: 180 tablet; Refill: 1    3. Diastolic dysfunction without heart failure  Overview:  Grade 1 diastolic dysfunction on echocardiogram March 8, 2024    Orders:  -     empagliflozin (JARDIANCE) 10 MG tablet tablet; Take 1 tablet by mouth Daily.  Dispense: 30 tablet; Refill: 4    4. Screening for colon cancer  -     Ambulatory Referral For Screening Colonoscopy    5. Acute pharyngitis, unspecified etiology  -     amoxicillin (AMOXIL) 500 MG capsule; Take 2 capsules by mouth 2 (Two) Times a Day.  Dispense: 28 capsule; Refill: 0    Other orders  -     atorvastatin (LIPITOR) 80 MG tablet; Take 1 tablet by mouth Every Night for 360 days.  Dispense: 90 tablet; Refill: 3    Plan  1.  Hypertension is better controlled.  Continue Jardiance 10 mg for treatment of multiple conditions.  Continue home monitoring of blood pressure with goal of 130/80 or less.  2.  Diabetes remains within reasonable control.  Continue to monitor for hypoglycemia.  Should hypoglycemia develop will discontinue metformin due to the benefits of his SGLT2 and GLP-1  3.  Recent CVA.  Patient will begin aspirin 325 mg daily for his lifetime  4.  Patient will be referred for screening colonoscopy        Follow Up  No follow-ups on file.  Patient was given instructions and  counseling regarding his condition or for health maintenance advice. Please see specific information pulled into the AVS if appropriate.

## 2024-04-05 ENCOUNTER — READMISSION MANAGEMENT (OUTPATIENT)
Dept: CALL CENTER | Facility: HOSPITAL | Age: 74
End: 2024-04-05
Payer: MEDICARE

## 2024-04-05 NOTE — OUTREACH NOTE
Stroke Week 3 Survey      Flowsheet Row Responses   [facility] facility patient discharged from? [place]   Does the patient have one of the following disease processes/diagnoses(primary or secondary)? Stroke   Week 3 attempt successful? No   Unsuccessful attempts Attempt 1            Romy CAMPUZANO - Registered Nurse

## 2024-04-09 ENCOUNTER — READMISSION MANAGEMENT (OUTPATIENT)
Dept: CALL CENTER | Facility: HOSPITAL | Age: 74
End: 2024-04-09
Payer: MEDICARE

## 2024-04-09 NOTE — OUTREACH NOTE
Stroke Week 3 Survey      Flowsheet Row Responses   [facility] facility patient discharged from? [place]   Does the patient have one of the following disease processes/diagnoses(primary or secondary)? Stroke   Week 3 attempt successful? No   Unsuccessful attempts Attempt 2            Pat SANCHES - Registered Nurse

## 2024-04-16 ENCOUNTER — OFFICE VISIT (OUTPATIENT)
Dept: NEUROLOGY | Facility: CLINIC | Age: 74
End: 2024-04-16
Payer: MEDICARE

## 2024-04-16 VITALS
OXYGEN SATURATION: 94 % | SYSTOLIC BLOOD PRESSURE: 114 MMHG | HEIGHT: 69 IN | BODY MASS INDEX: 37.47 KG/M2 | HEART RATE: 121 BPM | WEIGHT: 253 LBS | DIASTOLIC BLOOD PRESSURE: 72 MMHG | TEMPERATURE: 98.2 F

## 2024-04-16 DIAGNOSIS — Z86.73 HISTORY OF STROKE: Primary | ICD-10-CM

## 2024-04-16 DIAGNOSIS — E11.65 TYPE 2 DIABETES MELLITUS WITH HYPERGLYCEMIA, WITHOUT LONG-TERM CURRENT USE OF INSULIN: ICD-10-CM

## 2024-04-16 DIAGNOSIS — I10 ESSENTIAL HYPERTENSION: ICD-10-CM

## 2024-04-16 PROCEDURE — 3074F SYST BP LT 130 MM HG: CPT | Performed by: NURSE PRACTITIONER

## 2024-04-16 PROCEDURE — 3078F DIAST BP <80 MM HG: CPT | Performed by: NURSE PRACTITIONER

## 2024-04-16 PROCEDURE — 99214 OFFICE O/P EST MOD 30 MIN: CPT | Performed by: NURSE PRACTITIONER
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
## 2024-05-20 ENCOUNTER — OFFICE VISIT (OUTPATIENT)
Dept: FAMILY MEDICINE CLINIC | Facility: CLINIC | Age: 74
End: 2024-05-20
Payer: MEDICARE

## 2024-05-20 VITALS
SYSTOLIC BLOOD PRESSURE: 140 MMHG | TEMPERATURE: 98.4 F | OXYGEN SATURATION: 97 % | HEIGHT: 69 IN | DIASTOLIC BLOOD PRESSURE: 80 MMHG | WEIGHT: 244.2 LBS | RESPIRATION RATE: 20 BRPM | HEART RATE: 84 BPM | BODY MASS INDEX: 36.17 KG/M2

## 2024-05-20 DIAGNOSIS — J40 BRONCHITIS: Primary | ICD-10-CM

## 2024-05-20 DIAGNOSIS — I83.93 ASYMPTOMATIC VARICOSE VEINS OF BILATERAL LOWER EXTREMITIES: ICD-10-CM

## 2024-05-20 PROCEDURE — 1126F AMNT PAIN NOTED NONE PRSNT: CPT | Performed by: FAMILY MEDICINE

## 2024-05-20 PROCEDURE — G2211 COMPLEX E/M VISIT ADD ON: HCPCS | Performed by: FAMILY MEDICINE

## 2024-05-20 PROCEDURE — 3077F SYST BP >= 140 MM HG: CPT | Performed by: FAMILY MEDICINE

## 2024-05-20 PROCEDURE — 99213 OFFICE O/P EST LOW 20 MIN: CPT | Performed by: FAMILY MEDICINE

## 2024-05-20 PROCEDURE — 3079F DIAST BP 80-89 MM HG: CPT | Performed by: FAMILY MEDICINE

## 2024-05-20 PROCEDURE — 3051F HG A1C>EQUAL 7.0%<8.0%: CPT | Performed by: FAMILY MEDICINE

## 2024-05-20 RX ORDER — AZITHROMYCIN 250 MG/1
TABLET, FILM COATED ORAL
Qty: 6 TABLET | Refills: 0 | Status: SHIPPED | OUTPATIENT
Start: 2024-05-20

## 2024-05-20 NOTE — PROGRESS NOTES
"Chief Complaint   Patient presents with    sinus congestion, cough      Started on Wednesday        Subjective      Cj Cifuentes is a 73 y.o. who presents for 5 days of illness that began with rhinorrhea, sore throat and cough that is now progressed to more severe cough with green sputum production and mild dyspnea.  He denies fevers.    Objective   Vital Signs:  /80   Pulse 84   Temp 98.4 °F (36.9 °C)   Resp 20   Ht 175.3 cm (69.02\")   Wt 111 kg (244 lb 3.2 oz)   SpO2 97%   BMI 36.05 kg/m²     Physical Exam  Constitutional:       Appearance: Normal appearance.   HENT:      Head: Normocephalic and atraumatic.      Right Ear: Tympanic membrane and ear canal normal.      Left Ear: Tympanic membrane and ear canal normal.      Nose: Nose normal.      Mouth/Throat:      Mouth: Mucous membranes are moist.      Pharynx: Oropharynx is clear.   Eyes:      Conjunctiva/sclera: Conjunctivae normal.   Cardiovascular:      Rate and Rhythm: Normal rate and regular rhythm.      Heart sounds: Normal heart sounds. No murmur heard.  Pulmonary:      Effort: Pulmonary effort is normal. No respiratory distress.      Breath sounds: Examination of the right-upper field reveals rhonchi. Examination of the left-upper field reveals rhonchi. Examination of the right-middle field reveals rhonchi. Examination of the left-lower field reveals rhonchi. Rhonchi present.   Abdominal:      General: Abdomen is flat. Bowel sounds are normal. There is no distension.      Palpations: Abdomen is soft.      Tenderness: There is no abdominal tenderness.   Musculoskeletal:      Cervical back: Normal range of motion and neck supple. No tenderness.   Lymphadenopathy:      Cervical: No cervical adenopathy.   Skin:     General: Skin is warm and dry.   Neurological:      Mental Status: He is alert.   Psychiatric:         Mood and Affect: Mood normal.          Result Review                     Assessment and Plan  Diagnoses and all orders for this " visit:    1. Bronchitis (Primary)  -     azithromycin (Zithromax Z-Roel) 250 MG tablet; Take 2 tablets by mouth on day 1, then 1 tablet daily on days 2-5  Dispense: 6 tablet; Refill: 0    Plan: Patient will start azithromycin for acute bronchitis.  Recommend Robitussin DM for symptom of cough.        Follow Up  No follow-ups on file.  Patient was given instructions and counseling regarding his condition or for health maintenance advice. Please see specific information pulled into the AVS if appropriate.

## 2024-06-05 ENCOUNTER — OFFICE VISIT (OUTPATIENT)
Dept: FAMILY MEDICINE CLINIC | Facility: CLINIC | Age: 74
End: 2024-06-05
Payer: MEDICARE

## 2024-06-05 VITALS
RESPIRATION RATE: 16 BRPM | HEIGHT: 69 IN | OXYGEN SATURATION: 97 % | SYSTOLIC BLOOD PRESSURE: 142 MMHG | TEMPERATURE: 97.8 F | BODY MASS INDEX: 36.88 KG/M2 | WEIGHT: 249 LBS | HEART RATE: 85 BPM | DIASTOLIC BLOOD PRESSURE: 70 MMHG

## 2024-06-05 DIAGNOSIS — M25.551 RIGHT HIP PAIN: ICD-10-CM

## 2024-06-05 DIAGNOSIS — N30.90 CYSTITIS: Primary | ICD-10-CM

## 2024-06-05 DIAGNOSIS — R30.0 DYSURIA: ICD-10-CM

## 2024-06-05 LAB
BILIRUB BLD-MCNC: NEGATIVE MG/DL
CLARITY, POC: CLEAR
COLOR UR: ABNORMAL
EXPIRATION DATE: ABNORMAL
GLUCOSE UR STRIP-MCNC: ABNORMAL MG/DL
KETONES UR QL: NEGATIVE
LEUKOCYTE EST, POC: ABNORMAL
Lab: ABNORMAL
NITRITE UR-MCNC: NEGATIVE MG/ML
PH UR: 5.5 [PH] (ref 5–8)
PROT UR STRIP-MCNC: ABNORMAL MG/DL
RBC # UR STRIP: ABNORMAL /UL
SP GR UR: 1.02 (ref 1–1.03)
UROBILINOGEN UR QL: NORMAL

## 2024-06-05 PROCEDURE — 1159F MED LIST DOCD IN RCRD: CPT | Performed by: FAMILY MEDICINE

## 2024-06-05 PROCEDURE — 81003 URINALYSIS AUTO W/O SCOPE: CPT | Performed by: FAMILY MEDICINE

## 2024-06-05 PROCEDURE — 1126F AMNT PAIN NOTED NONE PRSNT: CPT | Performed by: FAMILY MEDICINE

## 2024-06-05 PROCEDURE — 3077F SYST BP >= 140 MM HG: CPT | Performed by: FAMILY MEDICINE

## 2024-06-05 PROCEDURE — 3078F DIAST BP <80 MM HG: CPT | Performed by: FAMILY MEDICINE

## 2024-06-05 PROCEDURE — 99213 OFFICE O/P EST LOW 20 MIN: CPT | Performed by: FAMILY MEDICINE

## 2024-06-05 PROCEDURE — 3051F HG A1C>EQUAL 7.0%<8.0%: CPT | Performed by: FAMILY MEDICINE

## 2024-06-05 PROCEDURE — 1160F RVW MEDS BY RX/DR IN RCRD: CPT | Performed by: FAMILY MEDICINE

## 2024-06-05 RX ORDER — NAPROXEN 500 MG/1
500 TABLET ORAL 2 TIMES DAILY WITH MEALS
Qty: 60 TABLET | Refills: 0 | Status: SHIPPED | OUTPATIENT
Start: 2024-06-05

## 2024-06-05 RX ORDER — CIPROFLOXACIN 500 MG/1
500 TABLET, FILM COATED ORAL 2 TIMES DAILY
Qty: 14 TABLET | Refills: 0 | Status: SHIPPED | OUTPATIENT
Start: 2024-06-05

## 2024-06-05 NOTE — PROGRESS NOTES
Subjective   Cj Cifuentes is a 73 y.o. male.     Dysuria     Back Pain  Associated symptoms include dysuria.        He has had dysuria and frequency  Urine has had an odor as well    He has had low back pain and discomfort  Hurts to stand on his R hip  This started Monday 6/3/24 at the same time his urine started to change and give him issues      The following portions of the patient's history were reviewed and updated as appropriate: allergies, current medications, past family history, past medical history, past social history, past surgical history, and problem list.    Review of Systems   Genitourinary:  Positive for dysuria.   Musculoskeletal:  Positive for back pain.       Objective   Physical Exam  Vitals and nursing note reviewed.   Constitutional:       General: He is not in acute distress.     Appearance: Normal appearance. He is well-developed.   Cardiovascular:      Rate and Rhythm: Normal rate and regular rhythm.      Heart sounds: Normal heart sounds.   Pulmonary:      Effort: Pulmonary effort is normal.      Breath sounds: Normal breath sounds.   Abdominal:      Comments: Mild B flank pain but more so in the R hip area when he walks   Musculoskeletal:        Back:    Neurological:      Mental Status: He is alert and oriented to person, place, and time.   Psychiatric:         Mood and Affect: Mood normal.         Behavior: Behavior normal.         Thought Content: Thought content normal.         Judgment: Judgment normal.         Assessment & Plan   Diagnoses and all orders for this visit:    1. Cystitis (Primary)  -     ciprofloxacin (Cipro) 500 MG tablet; Take 1 tablet by mouth 2 (Two) Times a Day.  Dispense: 14 tablet; Refill: 0  -     Urine Culture - Urine, Urine, Clean Catch    2. Dysuria  -     POCT urinalysis dipstick, automated    3. Right hip pain  -     naproxen (Naprosyn) 500 MG tablet; Take 1 tablet by mouth 2 (Two) Times a Day With Meals.  Dispense: 60 tablet; Refill: 0    +UA, treat  with cipro and check urine culture.  F/u pending labs    NSAID for low back pain.  He will call back INB in the next 48 hours

## 2024-06-07 ENCOUNTER — TELEPHONE (OUTPATIENT)
Dept: FAMILY MEDICINE CLINIC | Facility: CLINIC | Age: 74
End: 2024-06-07

## 2024-06-07 LAB
BACTERIA UR CULT: NO GROWTH
BACTERIA UR CULT: NORMAL

## 2024-06-07 NOTE — TELEPHONE ENCOUNTER
Spoke to PT he stated this was pertaining to last visit with Dr. Mendoza.  Pt would like a work excuse still not  feeling well enough to work today

## 2024-06-07 NOTE — TELEPHONE ENCOUNTER
Caller: Cj Cifuentes    Relationship: Self    Best call back number: [phone]     What form or medical record are you requesting: WORK EXCUSE    Who is requesting this form or medical record from you: PATIENT    How would you like to receive the form or medical records (pick-up, mail, fax):     Additional notes: THE PATIENT IS FEELING BETTER BUT NOT WELL ENOUGH TO GO TO WORK.

## 2024-06-13 ENCOUNTER — OFFICE VISIT (OUTPATIENT)
Dept: SLEEP MEDICINE | Facility: CLINIC | Age: 74
End: 2024-06-13
Payer: MEDICARE

## 2024-06-13 VITALS
OXYGEN SATURATION: 96 % | WEIGHT: 245 LBS | TEMPERATURE: 98.2 F | DIASTOLIC BLOOD PRESSURE: 82 MMHG | SYSTOLIC BLOOD PRESSURE: 144 MMHG | HEART RATE: 93 BPM | BODY MASS INDEX: 36.29 KG/M2 | HEIGHT: 69 IN

## 2024-06-13 DIAGNOSIS — G47.19 EXCESSIVE DAYTIME SLEEPINESS: ICD-10-CM

## 2024-06-13 DIAGNOSIS — I63.9 CEREBROVASCULAR ACCIDENT (CVA), UNSPECIFIED MECHANISM: ICD-10-CM

## 2024-06-13 DIAGNOSIS — R06.83 SNORING: ICD-10-CM

## 2024-06-13 DIAGNOSIS — E66.01 CLASS 2 SEVERE OBESITY DUE TO EXCESS CALORIES WITH SERIOUS COMORBIDITY AND BODY MASS INDEX (BMI) OF 38.0 TO 38.9 IN ADULT: ICD-10-CM

## 2024-06-13 DIAGNOSIS — G47.34 NOCTURNAL HYPOXEMIA: ICD-10-CM

## 2024-06-13 DIAGNOSIS — R29.818 SUSPECTED SLEEP APNEA: ICD-10-CM

## 2024-06-13 DIAGNOSIS — I10 ESSENTIAL HYPERTENSION: Primary | ICD-10-CM
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
## 2024-07-05 DIAGNOSIS — R11.2 NAUSEA AND VOMITING, UNSPECIFIED VOMITING TYPE: Primary | ICD-10-CM

## 2024-07-05 RX ORDER — ONDANSETRON 8 MG/1
8 TABLET, ORALLY DISINTEGRATING ORAL EVERY 8 HOURS PRN
Qty: 30 TABLET | Refills: 0 | Status: SHIPPED | OUTPATIENT
Start: 2024-07-05

## 2024-07-05 NOTE — PROGRESS NOTES
Patient had attempted to contact the office due to GI symptoms.  He reports in the middle the night he awoke with diarrhea and estimates he is having a bowel movement about 3 times per hour since then.  He is also vomited 4 times but recently has been able to keep liquids down.  He is unaware of any sick contacts.  He does not have any any abdominal pain or bloating.  He denies fevers or chills.  Patient was advised to consume plenty of fluids primarily in the form of Gatorade.  He will be given Zofran for any nausea.  Avoid any antidiarrheals for at least 48 hours.  If diarrhea persist at that time he may use Imodium or Pepto-Bismol.  Should patient develop abdominal pain or abdominal distention she should go to the emergency room

## 2024-07-09 ENCOUNTER — CLINICAL SUPPORT (OUTPATIENT)
Dept: FAMILY MEDICINE CLINIC | Facility: CLINIC | Age: 74
End: 2024-07-09
Payer: MEDICARE
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
## 2024-07-16 ENCOUNTER — OFFICE VISIT (OUTPATIENT)
Dept: NEUROLOGY | Facility: CLINIC | Age: 74
End: 2024-07-16
Payer: MEDICARE

## 2024-07-16 VITALS
WEIGHT: 258 LBS | OXYGEN SATURATION: 95 % | DIASTOLIC BLOOD PRESSURE: 86 MMHG | HEIGHT: 69 IN | BODY MASS INDEX: 38.21 KG/M2 | TEMPERATURE: 98.2 F | SYSTOLIC BLOOD PRESSURE: 142 MMHG | HEART RATE: 94 BPM

## 2024-07-16 DIAGNOSIS — Z86.73 HISTORY OF STROKE: Primary | ICD-10-CM

## 2024-07-23 ENCOUNTER — HOSPITAL ENCOUNTER (OUTPATIENT)
Dept: SLEEP MEDICINE | Facility: HOSPITAL | Age: 74
End: 2024-07-23
Payer: MEDICARE

## 2024-07-23 VITALS — BODY MASS INDEX: 38.21 KG/M2 | WEIGHT: 258 LBS | HEIGHT: 69 IN

## 2024-07-23 DIAGNOSIS — I10 ESSENTIAL HYPERTENSION: ICD-10-CM

## 2024-07-23 DIAGNOSIS — G47.34 NOCTURNAL HYPOXEMIA: ICD-10-CM

## 2024-07-23 DIAGNOSIS — E66.01 CLASS 2 SEVERE OBESITY DUE TO EXCESS CALORIES WITH SERIOUS COMORBIDITY AND BODY MASS INDEX (BMI) OF 38.0 TO 38.9 IN ADULT: ICD-10-CM

## 2024-07-23 DIAGNOSIS — R29.818 SUSPECTED SLEEP APNEA: ICD-10-CM

## 2024-07-23 DIAGNOSIS — G47.19 EXCESSIVE DAYTIME SLEEPINESS: ICD-10-CM

## 2024-07-23 DIAGNOSIS — R06.83 SNORING: ICD-10-CM

## 2024-07-23 DIAGNOSIS — I63.9 CEREBROVASCULAR ACCIDENT (CVA), UNSPECIFIED MECHANISM: ICD-10-CM

## 2024-07-23 PROCEDURE — 95800 SLP STDY UNATTENDED: CPT

## 2024-07-25 DIAGNOSIS — G47.33 OSA (OBSTRUCTIVE SLEEP APNEA): Primary | ICD-10-CM

## 2024-07-26 PROCEDURE — 95800 SLP STDY UNATTENDED: CPT | Performed by: INTERNAL MEDICINE

## 2024-07-29 ENCOUNTER — TELEPHONE (OUTPATIENT)
Dept: SLEEP MEDICINE | Age: 74
End: 2024-07-29
Payer: MEDICARE

## 2024-07-29 NOTE — TELEPHONE ENCOUNTER
Patient was told to call [facility] about cpap/supllies. He did but they do not to CPAP anymore. Please help call patient at [phone]. 31 to 90 day appt is scheduled.

## 2024-07-30 ENCOUNTER — OFFICE VISIT (OUTPATIENT)
Dept: FAMILY MEDICINE CLINIC | Facility: CLINIC | Age: 74
End: 2024-07-30
Payer: MEDICARE

## 2024-07-30 VITALS
DIASTOLIC BLOOD PRESSURE: 84 MMHG | HEIGHT: 69 IN | TEMPERATURE: 97.3 F | HEART RATE: 90 BPM | WEIGHT: 250 LBS | BODY MASS INDEX: 37.03 KG/M2 | SYSTOLIC BLOOD PRESSURE: 122 MMHG | OXYGEN SATURATION: 94 % | RESPIRATION RATE: 16 BRPM

## 2024-07-30 DIAGNOSIS — R82.90 MALODOROUS URINE: ICD-10-CM

## 2024-07-30 DIAGNOSIS — E03.8 SUBCLINICAL HYPOTHYROIDISM: ICD-10-CM

## 2024-07-30 DIAGNOSIS — E11.65 TYPE 2 DIABETES MELLITUS WITH HYPERGLYCEMIA, WITHOUT LONG-TERM CURRENT USE OF INSULIN: Primary | ICD-10-CM

## 2024-07-30 DIAGNOSIS — N20.0 RENAL STONE: ICD-10-CM

## 2024-07-30 DIAGNOSIS — I10 ESSENTIAL HYPERTENSION: ICD-10-CM

## 2024-07-30 DIAGNOSIS — R80.9 MICROALBUMINURIA: ICD-10-CM

## 2024-07-30 LAB
BILIRUB BLD-MCNC: NEGATIVE MG/DL
CLARITY, POC: CLEAR
COLOR UR: ABNORMAL
EXPIRATION DATE: ABNORMAL
EXPIRATION DATE: ABNORMAL
GLUCOSE UR STRIP-MCNC: NEGATIVE MG/DL
HBA1C MFR BLD: 7.5 % (ref 4.5–5.7)
KETONES UR QL: NEGATIVE
LEUKOCYTE EST, POC: ABNORMAL
Lab: ABNORMAL
Lab: ABNORMAL
NITRITE UR-MCNC: NEGATIVE MG/ML
PH UR: 6 [PH] (ref 5–8)
POC CREATININE URINE: ABNORMAL
POC MICROALBUMIN URINE: ABNORMAL
PROT UR STRIP-MCNC: ABNORMAL MG/DL
RBC # UR STRIP: NEGATIVE /UL
SP GR UR: 1.03 (ref 1–1.03)
UROBILINOGEN UR QL: NORMAL

## 2024-07-30 PROCEDURE — 3074F SYST BP LT 130 MM HG: CPT | Performed by: FAMILY MEDICINE

## 2024-07-30 PROCEDURE — 83036 HEMOGLOBIN GLYCOSYLATED A1C: CPT | Performed by: FAMILY MEDICINE

## 2024-07-30 PROCEDURE — 99214 OFFICE O/P EST MOD 30 MIN: CPT | Performed by: FAMILY MEDICINE

## 2024-07-30 PROCEDURE — 3079F DIAST BP 80-89 MM HG: CPT | Performed by: FAMILY MEDICINE

## 2024-07-30 PROCEDURE — 3051F HG A1C>EQUAL 7.0%<8.0%: CPT | Performed by: FAMILY MEDICINE

## 2024-07-30 PROCEDURE — 1126F AMNT PAIN NOTED NONE PRSNT: CPT | Performed by: FAMILY MEDICINE

## 2024-07-30 PROCEDURE — 82044 UR ALBUMIN SEMIQUANTITATIVE: CPT | Performed by: FAMILY MEDICINE

## 2024-07-30 PROCEDURE — G2211 COMPLEX E/M VISIT ADD ON: HCPCS | Performed by: FAMILY MEDICINE

## 2024-07-30 PROCEDURE — 81002 URINALYSIS NONAUTO W/O SCOPE: CPT | Performed by: FAMILY MEDICINE

## 2024-07-30 RX ORDER — SEMAGLUTIDE 0.68 MG/ML
0.5 INJECTION, SOLUTION SUBCUTANEOUS WEEKLY
Qty: 3 ML | Refills: 2 | Status: SHIPPED | OUTPATIENT
Start: 2024-07-30

## 2024-07-30 RX ORDER — LISINOPRIL 5 MG/1
5 TABLET ORAL DAILY
Qty: 30 TABLET | Refills: 2 | Status: SHIPPED | OUTPATIENT
Start: 2024-07-30

## 2024-07-30 NOTE — PROGRESS NOTES
"Chief Complaint   Patient presents with    6mo f/u DM        Subjective      Cj Cifuentes is a 73 y.o. who presents for diabetes maintenance visit.  He reports his CGM data shows blood sugars between 90 and 120.  He does recall a single day when his blood sugar spiked to over 200.  Otherwise he is feeling well.  He has not been on Ozempic for the last couple of months due to the cost of the medication and chose to increase metformin to twice daily.  This seemingly has resulted in increased episodes of diarrhea.    At last visit patient was identified as having microalbuminuria and plan had been to repeat urine microalbumin today    Patient also reports some malodorous urine over the last few days.  Patient does have a history of UTIs.    The following portions of the patient's history were reviewed and updated as appropriate: allergies, current medications, past family history, past medical history, past social history, past surgical history, and problem list.    Review of Systems    Objective   Vital Signs:  /84   Pulse 90   Temp 97.3 °F (36.3 °C)   Resp 16   Ht 175.3 cm (69\")   Wt 113 kg (250 lb)   SpO2 94%   BMI 36.92 kg/m²               Physical Exam  Vitals reviewed.   Constitutional:       Appearance: Normal appearance.   Cardiovascular:      Rate and Rhythm: Normal rate and regular rhythm.      Pulses: Normal pulses.   Pulmonary:      Effort: Pulmonary effort is normal.      Breath sounds: Normal breath sounds.   Abdominal:      Tenderness: There is no right CVA tenderness or left CVA tenderness.   Neurological:      Mental Status: He is alert.          Result Review   The following data was reviewed by: Gary Kaur MD on 07/30/2024:  Most Recent A1C          7/30/2024    08:03   HGBA1C Most Recent   Hemoglobin A1C 7.5      Data reviewed : UA CR  mg/g, UA with 1+ LE, 1+ protein, neg nitrities urine sample also contained a small stone             Assessment and Plan  Diagnoses " and all orders for this visit:    1. Type 2 diabetes mellitus with hyperglycemia, without long-term current use of insulin (Primary)  Comments:  Control is worsening.  Reduce metformin to once daily due to diarrhea.  Restart Ozempic at 0.25 mg weekly.  RTO 3 months  Orders:  -     POC Glycosylated Hemoglobin (Hb A1C)  -     Semaglutide,0.25 or 0.5MG/DOS, (Ozempic, 0.25 or 0.5 MG/DOSE,) 2 MG/3ML solution pen-injector; Inject 0.5 mg under the skin into the appropriate area as directed 1 (One) Time Per Week.  Dispense: 3 mL; Refill: 2  -     POC Microalbumin    2. Essential hypertension  Comments:  Stable.  Continue to monitor  Orders:  -     CBC & Differential    3. Subclinical hypothyroidism  Comments:  Stable.  Surveillance TSH ordered with reflex to free T4 if needed  Orders:  -     TSH Rfx On Abnormal To Free T4    4. Malodorous urine  Comments:  UA with mild abnormalities.  Urine culture will be obtained  Orders:  -     POC Urinalysis Dipstick  -     Urine Culture - Urine, Urine, Clean Catch    5. Microalbuminuria  Comments:  Confirmed.  Add low-dose ACE inhibitor  Overview:  Confirmed.  Add low-dose ACE inhibitor      6. Renal stone    Other orders  -     lisinopril (PRINIVIL,ZESTRIL) 5 MG tablet; Take 1 tablet by mouth Daily.  Dispense: 30 tablet; Refill: 2            Follow Up  Return in about 3 months (around 10/30/2024) for Recheck Diabetes.  Patient was given instructions and counseling regarding his condition or for health maintenance advice. Please see specific information pulled into the AVS if appropriate.

## 2024-07-31 LAB
BASOPHILS # BLD AUTO: 0.06 10*3/MM3 (ref 0–0.2)
BASOPHILS NFR BLD AUTO: 0.5 % (ref 0–1.5)
EOSINOPHIL # BLD AUTO: 0.21 10*3/MM3 (ref 0–0.4)
EOSINOPHIL NFR BLD AUTO: 1.7 % (ref 0.3–6.2)
ERYTHROCYTE [DISTWIDTH] IN BLOOD BY AUTOMATED COUNT: 13.4 % (ref 12.3–15.4)
HCT VFR BLD AUTO: 43.4 % (ref 37.5–51)
HGB BLD-MCNC: 14.2 G/DL (ref 13–17.7)
IMM GRANULOCYTES # BLD AUTO: 0.04 10*3/MM3 (ref 0–0.05)
IMM GRANULOCYTES NFR BLD AUTO: 0.3 % (ref 0–0.5)
LYMPHOCYTES # BLD AUTO: 1.7 10*3/MM3 (ref 0.7–3.1)
LYMPHOCYTES NFR BLD AUTO: 13.8 % (ref 19.6–45.3)
MCH RBC QN AUTO: 29.2 PG (ref 26.6–33)
MCHC RBC AUTO-ENTMCNC: 32.7 G/DL (ref 31.5–35.7)
MCV RBC AUTO: 89.1 FL (ref 79–97)
MONOCYTES # BLD AUTO: 0.92 10*3/MM3 (ref 0.1–0.9)
MONOCYTES NFR BLD AUTO: 7.5 % (ref 5–12)
NEUTROPHILS # BLD AUTO: 9.4 10*3/MM3 (ref 1.7–7)
NEUTROPHILS NFR BLD AUTO: 76.2 % (ref 42.7–76)
NRBC BLD AUTO-RTO: 0 /100 WBC (ref 0–0.2)
PLATELET # BLD AUTO: 231 10*3/MM3 (ref 140–450)
RBC # BLD AUTO: 4.87 10*6/MM3 (ref 4.14–5.8)
T4 FREE SERPL-MCNC: 1.09 NG/DL (ref 0.93–1.7)
TSH SERPL DL<=0.005 MIU/L-ACNC: 5.52 UIU/ML (ref 0.27–4.2)
WBC # BLD AUTO: 12.33 10*3/MM3 (ref 3.4–10.8)

## 2024-08-01 LAB
BACTERIA UR CULT: NO GROWTH
BACTERIA UR CULT: NORMAL

## 2024-08-14 ENCOUNTER — TELEPHONE (OUTPATIENT)
Dept: PAIN MEDICINE | Facility: CLINIC | Age: 74
End: 2024-08-14
Payer: MEDICARE

## 2024-08-14 NOTE — TELEPHONE ENCOUNTER
Caller: Cj Cifuentes    Relationship to patient: Self    Best call back number: [phone]    Chief complaint: PAIN HAS COME BACK IN NECK AND DOWN SHOULDERS    Type of visit: INJECTION    Requested date: ASAP

## 2024-09-09 ENCOUNTER — TELEPHONE (OUTPATIENT)
Dept: PAIN MEDICINE | Facility: CLINIC | Age: 74
End: 2024-09-09
Payer: MEDICARE

## 2024-09-09 NOTE — TELEPHONE ENCOUNTER
Provider: BYRON KHAN    Caller: CALLIE ASHLEY    Relationship to Patient: SELF    Phone Number: [phone]    Reason for Call: PT STATES HE FELL TODAY AND HAS BEEN HAVING TROUBLE WITH HIS LEFT SIDE AND MORE PAIN IN HIS NECK. PT WANTING TO SEE ABOUT BEING SEEN SOONER THAN 9/16/24 REGARDING GETTING ANOTHER INJECTION.

## 2024-09-10 ENCOUNTER — APPOINTMENT (OUTPATIENT)
Dept: GENERAL RADIOLOGY | Facility: HOSPITAL | Age: 74
End: 2024-09-10
Payer: MEDICARE

## 2024-09-10 ENCOUNTER — TELEPHONE (OUTPATIENT)
Dept: PAIN MEDICINE | Facility: CLINIC | Age: 74
End: 2024-09-10
Payer: MEDICARE

## 2024-09-10 ENCOUNTER — TELEPHONE (OUTPATIENT)
Dept: FAMILY MEDICINE CLINIC | Facility: CLINIC | Age: 74
End: 2024-09-10
Payer: MEDICARE

## 2024-09-10 ENCOUNTER — HOSPITAL ENCOUNTER (EMERGENCY)
Facility: HOSPITAL | Age: 74
Discharge: HOME | End: 2024-09-10
Attending: EMERGENCY MEDICINE | Admitting: EMERGENCY MEDICINE
Payer: MEDICARE

## 2024-09-10 ENCOUNTER — APPOINTMENT (OUTPATIENT)
Dept: CT IMAGING | Facility: HOSPITAL | Age: 74
End: 2024-09-10
Payer: MEDICARE

## 2024-09-10 VITALS
OXYGEN SATURATION: 100 % | RESPIRATION RATE: 18 BRPM | SYSTOLIC BLOOD PRESSURE: 147 MMHG | DIASTOLIC BLOOD PRESSURE: 76 MMHG | HEIGHT: 69 IN | WEIGHT: 240 LBS | BODY MASS INDEX: 35.55 KG/M2 | HEART RATE: 77 BPM | TEMPERATURE: 99 F

## 2024-09-10 DIAGNOSIS — S13.9XXA NECK SPRAIN, INITIAL ENCOUNTER: ICD-10-CM
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
## 2024-09-10 DIAGNOSIS — S50.02XA CONTUSION OF LEFT ELBOW, INITIAL ENCOUNTER: ICD-10-CM

## 2024-09-10 DIAGNOSIS — S80.02XA CONTUSION OF LEFT KNEE, INITIAL ENCOUNTER: ICD-10-CM

## 2024-09-10 DIAGNOSIS — S33.5XXA SPRAIN OF LOW BACK, INITIAL ENCOUNTER: ICD-10-CM

## 2024-09-10 DIAGNOSIS — S70.02XA CONTUSION OF LEFT HIP, INITIAL ENCOUNTER: ICD-10-CM

## 2024-09-10 PROCEDURE — 72131 CT LUMBAR SPINE W/O DYE: CPT

## 2024-09-10 PROCEDURE — 73080 X-RAY EXAM OF ELBOW: CPT

## 2024-09-10 PROCEDURE — 72125 CT NECK SPINE W/O DYE: CPT

## 2024-09-10 PROCEDURE — 99284 EMERGENCY DEPT VISIT MOD MDM: CPT

## 2024-09-10 PROCEDURE — 73560 X-RAY EXAM OF KNEE 1 OR 2: CPT

## 2024-09-10 PROCEDURE — 73502 X-RAY EXAM HIP UNI 2-3 VIEWS: CPT

## 2024-09-10 NOTE — TELEPHONE ENCOUNTER
Surgery/Procedure:  cervical epidural steroid injection by left paramedian interlaminar approach   Surgery/Procedure Date:  08/28/2024  Last visit:   Office Visit with Gauri Simms APRN (01/23/2024)   Next visit: 09/16/2024     Reason for call:    Patient states he fell yesterday and is having trouble with his left side. He is requesting a sooner appointment then 09/16/24 and would like to discuss getting another injection.     I have called the patient to gather further information.     Patient states he fell yesterday at his [employer] school and reports that his left leg went numb/weak. The numbness is only on the left leg, knee and down to foot.   He reports that the numbness started on August 1st and has since gotten worse.   He also has numbness in his left arm and was only having numbness in index and middle fingers. After the fall he reports that all of his fingers have gone numb. Patient denies going to ER after the fall.     He reports that his pain level is a 9 and the only thing that seems to be helping with neck pain is Naproxen.     Nothing helps relieve the numbness.

## 2024-09-10 NOTE — TELEPHONE ENCOUNTER
S/w patient and relayed Gauri's message. Patient states he gets off work at 1:30 PM today and will contact his PCP then.

## 2024-09-10 NOTE — TELEPHONE ENCOUNTER
Caller: Cj Cifuentes    Relationship to patient: Self    Best call back number: [phone]     Chief complaint: NUMBNESS ON LEFT SIDE OF BODY    Patient directed to call 911 or go to their nearest emergency room.     Patient verbalized understanding: [x] Yes  [] No
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
## 2024-09-10 NOTE — TELEPHONE ENCOUNTER
Caller: Cj Cifuentes    Relationship to patient: Self    Best call back number: [phone] (home)     Patient is needing: PATIENT STATES THAT HE IS UNABLE TO GET INTO HIS PCP AND IS GOING TO BE TAKEN TO THE ER FOLLOWING THE FALL HE HAD YESTERDAY. THERE IS A PREVIOUS TE THAT WAS SIGNED IN REGARDS TO THIS.   CONTACT PATIENT AS NEEDED

## 2024-09-16 ENCOUNTER — OFFICE VISIT (OUTPATIENT)
Dept: PAIN MEDICINE | Facility: CLINIC | Age: 74
End: 2024-09-16
Payer: OTHER MISCELLANEOUS

## 2024-09-16 VITALS — WEIGHT: 259.2 LBS | HEIGHT: 69 IN | HEART RATE: 92 BPM | BODY MASS INDEX: 38.39 KG/M2 | OXYGEN SATURATION: 96 %

## 2024-09-16 DIAGNOSIS — E11.65 TYPE 2 DIABETES MELLITUS WITH HYPERGLYCEMIA, WITHOUT LONG-TERM CURRENT USE OF INSULIN: ICD-10-CM

## 2024-09-16 DIAGNOSIS — M47.812 CERVICAL SPONDYLOSIS WITHOUT MYELOPATHY: ICD-10-CM

## 2024-09-16 DIAGNOSIS — M54.12 CERVICAL RADICULOPATHY: ICD-10-CM

## 2024-09-16 DIAGNOSIS — M54.12 CERVICAL RADICULOPATHY: Primary | ICD-10-CM

## 2024-09-16 DIAGNOSIS — E11.42 DIABETIC POLYNEUROPATHY ASSOCIATED WITH TYPE 2 DIABETES MELLITUS: ICD-10-CM

## 2024-09-16 DIAGNOSIS — M50.30 DDD (DEGENERATIVE DISC DISEASE), CERVICAL: ICD-10-CM

## 2024-09-16 DIAGNOSIS — M99.41 CONNECTIVE TISSUE STENOSIS OF NEURAL CANAL OF CERVICAL REGION: ICD-10-CM
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
## 2024-09-16 DIAGNOSIS — E66.9 OBESITY (BMI 30-39.9): ICD-10-CM

## 2024-09-16 PROCEDURE — 99214 OFFICE O/P EST MOD 30 MIN: CPT | Performed by: NURSE PRACTITIONER

## 2024-09-16 RX ORDER — NAPROXEN 250 MG/1
250 TABLET ORAL 2 TIMES DAILY PRN
COMMUNITY

## 2024-09-19 ENCOUNTER — TELEPHONE (OUTPATIENT)
Dept: PAIN MEDICINE | Facility: CLINIC | Age: 74
End: 2024-09-19
Payer: MEDICARE

## 2024-09-26 ENCOUNTER — EXTERNAL PBMM DATA (OUTPATIENT)
Dept: PHARMACY | Facility: OTHER | Age: 74
End: 2024-09-26
Payer: MEDICARE

## 2024-10-17 ENCOUNTER — TELEPHONE (OUTPATIENT)
Dept: PAIN MEDICINE | Facility: CLINIC | Age: 74
End: 2024-10-17
Payer: MEDICARE

## 2024-10-17 NOTE — TELEPHONE ENCOUNTER
S/w patient, confirmed that he was still on the schedule with Dr Madrigal on 10/21/2024, and that his insurance was approved. Provided patient with phone number for [facility].

## 2024-10-21 ENCOUNTER — OUTSIDE FACILITY SERVICE (OUTPATIENT)
Dept: PAIN MEDICINE | Facility: CLINIC | Age: 74
End: 2024-10-21
Payer: OTHER MISCELLANEOUS

## 2024-10-24 ENCOUNTER — TELEPHONE (OUTPATIENT)
Dept: PAIN MEDICINE | Facility: CLINIC | Age: 74
End: 2024-10-24
Payer: MEDICARE

## 2024-10-24 DIAGNOSIS — R80.9 MICROALBUMINURIA: ICD-10-CM

## 2024-10-24 RX ORDER — LISINOPRIL 5 MG/1
5 TABLET ORAL DAILY
Qty: 30 TABLET | Refills: 2 | Status: SHIPPED | OUTPATIENT
Start: 2024-10-24

## 2024-10-29 ENCOUNTER — OFFICE VISIT (OUTPATIENT)
Dept: SLEEP MEDICINE | Facility: CLINIC | Age: 74
End: 2024-10-29
Payer: MEDICARE

## 2024-10-29 ENCOUNTER — OFFICE VISIT (OUTPATIENT)
Dept: FAMILY MEDICINE CLINIC | Facility: CLINIC | Age: 74
End: 2024-10-29
Payer: MEDICARE

## 2024-10-29 VITALS
SYSTOLIC BLOOD PRESSURE: 140 MMHG | WEIGHT: 259 LBS | OXYGEN SATURATION: 95 % | BODY MASS INDEX: 38.36 KG/M2 | HEIGHT: 69 IN | HEART RATE: 79 BPM | DIASTOLIC BLOOD PRESSURE: 78 MMHG | TEMPERATURE: 98 F

## 2024-10-29 VITALS
OXYGEN SATURATION: 98 % | HEIGHT: 69 IN | BODY MASS INDEX: 38.39 KG/M2 | TEMPERATURE: 97.1 F | DIASTOLIC BLOOD PRESSURE: 78 MMHG | SYSTOLIC BLOOD PRESSURE: 140 MMHG | HEART RATE: 66 BPM | WEIGHT: 259.2 LBS | RESPIRATION RATE: 20 BRPM

## 2024-10-29 DIAGNOSIS — G47.33 OSA (OBSTRUCTIVE SLEEP APNEA): Primary | ICD-10-CM

## 2024-10-29 DIAGNOSIS — E03.8 SUBCLINICAL HYPOTHYROIDISM: ICD-10-CM

## 2024-10-29 DIAGNOSIS — Z12.11 COLON CANCER SCREENING: ICD-10-CM

## 2024-10-29 DIAGNOSIS — E11.65 TYPE 2 DIABETES MELLITUS WITH HYPERGLYCEMIA, WITHOUT LONG-TERM CURRENT USE OF INSULIN: Primary | ICD-10-CM

## 2024-10-29 DIAGNOSIS — R80.9 MICROALBUMINURIA: ICD-10-CM

## 2024-10-29 DIAGNOSIS — G47.34 NOCTURNAL HYPOXEMIA: ICD-10-CM

## 2024-10-29 DIAGNOSIS — I10 ESSENTIAL HYPERTENSION: ICD-10-CM

## 2024-10-29 LAB
EXPIRATION DATE: ABNORMAL
HBA1C MFR BLD: 8.3 % (ref 4.5–5.7)
Lab: ABNORMAL

## 2024-10-29 PROCEDURE — 3077F SYST BP >= 140 MM HG: CPT | Performed by: NURSE PRACTITIONER

## 2024-10-29 PROCEDURE — 83036 HEMOGLOBIN GLYCOSYLATED A1C: CPT | Performed by: FAMILY MEDICINE

## 2024-10-29 PROCEDURE — G2211 COMPLEX E/M VISIT ADD ON: HCPCS | Performed by: FAMILY MEDICINE

## 2024-10-29 PROCEDURE — 3052F HG A1C>EQUAL 8.0%<EQUAL 9.0%: CPT | Performed by: FAMILY MEDICINE

## 2024-10-29 PROCEDURE — 99214 OFFICE O/P EST MOD 30 MIN: CPT | Performed by: FAMILY MEDICINE

## 2024-10-29 PROCEDURE — 1125F AMNT PAIN NOTED PAIN PRSNT: CPT | Performed by: FAMILY MEDICINE

## 2024-10-29 PROCEDURE — 3078F DIAST BP <80 MM HG: CPT | Performed by: FAMILY MEDICINE

## 2024-10-29 PROCEDURE — 3078F DIAST BP <80 MM HG: CPT | Performed by: NURSE PRACTITIONER

## 2024-10-29 PROCEDURE — 3077F SYST BP >= 140 MM HG: CPT | Performed by: FAMILY MEDICINE

## 2024-10-29 PROCEDURE — 99213 OFFICE O/P EST LOW 20 MIN: CPT | Performed by: NURSE PRACTITIONER

## 2024-10-29 RX ORDER — LISINOPRIL 10 MG/1
10 TABLET ORAL DAILY
Qty: 90 TABLET | Refills: 1 | Status: SHIPPED | OUTPATIENT
Start: 2024-10-29

## 2024-10-29 RX ORDER — SEMAGLUTIDE 2.68 MG/ML
2 INJECTION, SOLUTION SUBCUTANEOUS WEEKLY
Qty: 3 ML | Refills: 2 | Status: SHIPPED | OUTPATIENT
Start: 2024-10-29

## 2024-10-29 NOTE — PROGRESS NOTES
"Chief Complaint   Patient presents with    3 mo f/u     Pt is fasting.       Subjective      Cj Cifuentes is a 74 y.o. who presents for DM and HTN. Blood sugars have been slightly higher than.  He continues on Ozempic 1 mg daily and metformin he is used too. He continues metformin 5oo mg daily and Ozempic 1 mg daily. HE is not monitoring blood pressure    The following portions of the patient's history were reviewed and updated as appropriate: allergies, current medications, past family history, past medical history, past social history, past surgical history, and problem list.    Review of Systems    Objective   Vital Signs:  /78   Pulse 66   Temp 97.1 °F (36.2 °C)   Resp 20   Ht 175.3 cm (69\")   Wt 118 kg (259 lb 3.2 oz)   SpO2 98%   BMI 38.28 kg/m²               Physical Exam  Vitals reviewed.   Constitutional:       Appearance: Normal appearance.   Neurological:      Mental Status: He is alert.          Result Review   The following data was reviewed by: Gary Kaur MD on 10/29/2024:  Most Recent A1C          10/29/2024    08:42   HGBA1C Most Recent   Hemoglobin A1C 8.3                    Assessment and Plan  Diagnoses and all orders for this visit:    1. Type 2 diabetes mellitus with hyperglycemia, without long-term current use of insulin (Primary)  Comments:  Worsening. Increase Ozempic to 2 mg. RTO 3 months  Orders:  -     POC Glycosylated Hemoglobin (Hb A1C)    2. Subclinical hypothyroidism    3. Microalbuminuria  Comments:  Confirmed.  Add low-dose ACE inhibitor  Overview:  Confirmed.  Add low-dose ACE inhibitor    Orders:  -     lisinopril (PRINIVIL,ZESTRIL) 10 MG tablet; Take 1 tablet by mouth Daily.  Dispense: 90 tablet; Refill: 1    4. Colon cancer screening  -     Ambulatory Referral For Screening Colonoscopy    5. Essential hypertension  Comments:  Not at goal. Increase LIsinopril. Increase PA and reduce sodium    Other orders  -     metFORMIN (GLUCOPHAGE) 500 MG tablet; " Take 1 tablet by mouth Daily With Breakfast.  Dispense: 90 tablet; Refill: 1  -     Semaglutide, 2 MG/DOSE, (Ozempic, 2 MG/DOSE,) 8 MG/3ML solution pen-injector; Inject 2 mg under the skin into the appropriate area as directed 1 (One) Time Per Week.  Dispense: 3 mL; Refill: 2            Follow Up  No follow-ups on file.  Patient was given instructions and counseling regarding his condition or for health maintenance advice. Please see specific information pulled into the AVS if appropriate.

## 2024-10-29 NOTE — PROGRESS NOTES
"Chief Complaint:   Chief Complaint   Patient presents with    Follow-up    Sleep Apnea       HPI:    Cj Cifuentes is a 74 y.o. male here for follow-up of sleep apnea.  Patient was seen in consult 6/13/2024.  Patient had a greater than 5-year history of excessive daytime sleepiness, witnessed apneas and nocturnal hypoxemia while in the hospital.  He was getting up frequently in the night to use the restroom.  Patient had a sleep study 7/23/2024 that showed moderate obstructive sleep apnea with an AHI of 19.4 as well as nocturnal hypoxemia.  Patient did initiate CPAP therapy.  Patient states \"I am totally a different person.\"  Patient is very rested and no longer gets up during the night.  He is sleeping 8 hours and goes to sleep very easily.  Patient has an [place] score of 5/24.  Patient states he cannot believe how good he feels.  He is having no concerns or complaints and will continue therapy.        Current medications are:   Current Outpatient Medications:     aspirin 325 MG EC tablet, Take 1 tablet by mouth Daily. To start on 3/31/24, Disp: 90 tablet, Rfl: 3    atorvastatin (LIPITOR) 80 MG tablet, Take 1 tablet by mouth Every Night for 360 days., Disp: 90 tablet, Rfl: 3    Continuous Blood Gluc  (FreeStyle Kera 2 [employer]red) device, , Disp: , Rfl:     Continuous Blood Gluc Sensor (FreeStyle Kera 2 Sensor) misc, Use 1 each Every 14 (Fourteen) Days., Disp: 2 each, Rfl: 11    lisinopril (PRINIVIL,ZESTRIL) 10 MG tablet, Take 1 tablet by mouth Daily., Disp: 90 tablet, Rfl: 1    metFORMIN (GLUCOPHAGE) 500 MG tablet, Take 1 tablet by mouth Daily With Breakfast., Disp: 90 tablet, Rfl: 1    naproxen (NAPROSYN) 250 MG tablet, Take 1 tablet by mouth 2 (Two) Times a Day As Needed., Disp: , Rfl:     Semaglutide, 2 MG/DOSE, (Ozempic, 2 MG/DOSE,) 8 MG/3ML solution pen-injector, Inject 2 mg under the skin into the appropriate area as directed 1 (One) Time Per Week., Disp: 3 mL, Rfl: 2    vitamin B-12 " "(CYANOCOBALAMIN) 1000 MCG tablet, Take 1 tablet by mouth Daily., Disp: , Rfl: .      The patient's relevant past medical, surgical, family and social history were reviewed and updated in Epic as appropriate.       Review of Systems   Constitutional:  Positive for activity change and appetite change.   Respiratory:  Positive for apnea and cough.    Cardiovascular:  Positive for leg swelling.   Gastrointestinal:  Positive for diarrhea.   Genitourinary:  Positive for decreased urine volume and difficulty urinating.   Musculoskeletal:  Positive for arthralgias, back pain, neck pain and neck stiffness.   Neurological:  Positive for dizziness, weakness, light-headedness, numbness and headaches.   Hematological:  Bruises/bleeds easily.   Psychiatric/Behavioral:  Positive for sleep disturbance.    All other systems reviewed and are negative.        Objective:    Physical Exam  Constitutional:       Appearance: Normal appearance.   HENT:      Head: Normocephalic and atraumatic.      Mouth/Throat:      Mouth: Mucous membranes are moist.      Pharynx: Oropharynx is clear.      Comments: Class 2 airway  Cardiovascular:      Rate and Rhythm: Normal rate and regular rhythm.   Pulmonary:      Effort: Pulmonary effort is normal.      Breath sounds: Normal breath sounds.   Skin:     General: Skin is warm and dry.   Neurological:      Mental Status: He is alert.   Psychiatric:         Mood and Affect: Mood normal.         Behavior: Behavior normal.         Thought Content: Thought content normal.         Judgment: Judgment normal.       /78   Pulse 79   Temp 98 °F (36.7 °C)   Ht 175.3 cm (69.02\")   Wt 117 kg (259 lb)   SpO2 95%   BMI 38.23 kg/m²     CPAP Report  76/81 days of use  Greater than 4-hour use 79%  Setting 4-20  95th percentile pressure 10.5  AHI 4.4    The patient continues to use and benefit from CPAP therapy.    ASSESSMENT/PLAN    Diagnoses and all orders for this visit:    1. LORIE (obstructive sleep apnea) " (Primary)  -     Overnight Sleep Oximetry Study; Future  -     PAP Therapy    2. Nocturnal hypoxemia  -     Overnight Sleep Oximetry Study; Future        Supplies x 1 year.  Return to clinic 1 year sooner symptoms warrant we will do an overnight oximetry while wearing CPAP and reassess nocturnal hypoxemia.  Patient will be called with these results.      Signed by  Colette Mckeon, ROCIO    October 29, 2024      CC: Gary Kaur MD         No ref. provider found

## 2024-10-31 ENCOUNTER — POP HEALTH PHARMACY (OUTPATIENT)
Dept: PHARMACY | Facility: OTHER | Age: 74
End: 2024-10-31
Payer: MEDICARE

## 2024-11-19 ENCOUNTER — OFFICE VISIT (OUTPATIENT)
Dept: FAMILY MEDICINE CLINIC | Facility: CLINIC | Age: 74
End: 2024-11-19
Payer: MEDICARE

## 2024-11-19 VITALS
RESPIRATION RATE: 16 BRPM | TEMPERATURE: 97.1 F | HEIGHT: 69 IN | HEART RATE: 51 BPM | SYSTOLIC BLOOD PRESSURE: 158 MMHG | OXYGEN SATURATION: 98 % | WEIGHT: 260.4 LBS | DIASTOLIC BLOOD PRESSURE: 70 MMHG | BODY MASS INDEX: 38.57 KG/M2

## 2024-11-19 DIAGNOSIS — M62.830 MUSCLE SPASM OF BACK: ICD-10-CM

## 2024-11-19 DIAGNOSIS — M54.50 LUMBAR BACK PAIN: Primary | ICD-10-CM

## 2024-11-19 PROBLEM — D72.823 NEUTROPHILIC LEUKEMOID REACTION: Status: ACTIVE | Noted: 2022-10-28

## 2024-11-19 PROBLEM — A49.8 PSEUDOMONAS INFECTION: Status: ACTIVE | Noted: 2023-01-17

## 2024-11-19 PROBLEM — N45.3 EPIDIDYMOORCHITIS: Status: ACTIVE | Noted: 2023-01-17

## 2024-11-19 PROBLEM — E11.9 TYPE 2 DIABETES MELLITUS WITHOUT COMPLICATIONS: Status: ACTIVE | Noted: 2022-10-28

## 2024-11-19 PROBLEM — B37.49 CANDIDURIA: Status: ACTIVE | Noted: 2023-01-30

## 2024-11-19 PROCEDURE — 99214 OFFICE O/P EST MOD 30 MIN: CPT | Performed by: NURSE PRACTITIONER

## 2024-11-19 PROCEDURE — 3077F SYST BP >= 140 MM HG: CPT | Performed by: NURSE PRACTITIONER

## 2024-11-19 PROCEDURE — 1125F AMNT PAIN NOTED PAIN PRSNT: CPT | Performed by: NURSE PRACTITIONER

## 2024-11-19 PROCEDURE — 1159F MED LIST DOCD IN RCRD: CPT | Performed by: NURSE PRACTITIONER

## 2024-11-19 PROCEDURE — 3078F DIAST BP <80 MM HG: CPT | Performed by: NURSE PRACTITIONER

## 2024-11-19 PROCEDURE — 3052F HG A1C>EQUAL 8.0%<EQUAL 9.0%: CPT | Performed by: NURSE PRACTITIONER

## 2024-11-19 PROCEDURE — 1160F RVW MEDS BY RX/DR IN RCRD: CPT | Performed by: NURSE PRACTITIONER

## 2024-11-19 PROCEDURE — 96372 THER/PROPH/DIAG INJ SC/IM: CPT | Performed by: NURSE PRACTITIONER

## 2024-11-19 RX ORDER — KETOROLAC TROMETHAMINE 30 MG/ML
60 INJECTION, SOLUTION INTRAMUSCULAR; INTRAVENOUS ONCE
Status: COMPLETED | OUTPATIENT
Start: 2024-11-19 | End: 2024-11-19

## 2024-11-19 RX ORDER — HYDROCODONE BITARTRATE AND ACETAMINOPHEN 5; 325 MG/1; MG/1
1 TABLET ORAL EVERY 6 HOURS PRN
Qty: 20 TABLET | Refills: 0 | Status: SHIPPED | OUTPATIENT
Start: 2024-11-19 | End: 2024-11-24

## 2024-11-19 RX ORDER — CYCLOBENZAPRINE HCL 10 MG
10 TABLET ORAL 2 TIMES DAILY PRN
Qty: 20 TABLET | Refills: 0 | Status: SHIPPED | OUTPATIENT
Start: 2024-11-19

## 2024-11-19 RX ADMIN — KETOROLAC TROMETHAMINE 60 MG: 30 INJECTION, SOLUTION INTRAMUSCULAR; INTRAVENOUS at 09:44
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
## 2024-11-20 ENCOUNTER — TELEPHONE (OUTPATIENT)
Dept: FAMILY MEDICINE CLINIC | Facility: CLINIC | Age: 74
End: 2024-11-20
Payer: MEDICARE

## 2024-11-20 ENCOUNTER — FLU SHOT (OUTPATIENT)
Dept: FAMILY MEDICINE CLINIC | Facility: CLINIC | Age: 74
End: 2024-11-20
Payer: MEDICARE

## 2024-11-20 DIAGNOSIS — Z23 NEED FOR INFLUENZA VACCINATION: Primary | ICD-10-CM

## 2024-11-20 NOTE — TELEPHONE ENCOUNTER
Outcome  Approved today by [employer] 2017  PA Case: [id number], Status: Approved,   Coverage Starts on: 8/21/2024 12:00:00 AM, Coverage Ends on: 11/20/2025 12:00:00 AM.  Authorization Expiration Date: 11/19/2025  FAXED TO PHARMACY

## 2024-12-02 RX ORDER — SODIUM, POTASSIUM,MAG SULFATES 17.5-3.13G
1 SOLUTION, RECONSTITUTED, ORAL ORAL TAKE AS DIRECTED
Qty: 354 ML | Refills: 0 | Status: SHIPPED | OUTPATIENT
Start: 2024-12-02

## 2024-12-09 DIAGNOSIS — E11.65 TYPE 2 DIABETES MELLITUS WITH HYPERGLYCEMIA, WITHOUT LONG-TERM CURRENT USE OF INSULIN: ICD-10-CM

## 2024-12-10 ENCOUNTER — OUTSIDE FACILITY SERVICE (OUTPATIENT)
Dept: GASTROENTEROLOGY | Facility: CLINIC | Age: 74
End: 2024-12-10
Payer: MEDICARE

## 2024-12-10 PROCEDURE — 45380 COLONOSCOPY AND BIOPSY: CPT | Performed by: INTERNAL MEDICINE

## 2024-12-10 PROCEDURE — 88305 TISSUE EXAM BY PATHOLOGIST: CPT | Performed by: INTERNAL MEDICINE

## 2024-12-11 ENCOUNTER — LAB REQUISITION (OUTPATIENT)
Dept: LAB | Facility: HOSPITAL | Age: 74
End: 2024-12-11
Payer: MEDICARE

## 2024-12-11 DIAGNOSIS — K57.30 DIVERTICULOSIS OF LARGE INTESTINE WITHOUT PERFORATION OR ABSCESS WITHOUT BLEEDING: ICD-10-CM

## 2024-12-11 DIAGNOSIS — D12.0 BENIGN NEOPLASM OF CECUM: ICD-10-CM

## 2024-12-11 DIAGNOSIS — K64.8 OTHER HEMORRHOIDS: ICD-10-CM

## 2024-12-11 DIAGNOSIS — Z12.11 ENCOUNTER FOR SCREENING FOR MALIGNANT NEOPLASM OF COLON: ICD-10-CM

## 2024-12-13 LAB — REF LAB TEST METHOD: NORMAL

## 2024-12-18 ENCOUNTER — OFFICE VISIT (OUTPATIENT)
Dept: UROLOGY | Facility: CLINIC | Age: 74
End: 2024-12-18
Payer: MEDICARE

## 2024-12-18 VITALS — WEIGHT: 260.14 LBS | BODY MASS INDEX: 38.53 KG/M2 | HEIGHT: 69 IN

## 2024-12-18 DIAGNOSIS — R33.8 BENIGN PROSTATIC HYPERPLASIA WITH URINARY RETENTION: Primary | ICD-10-CM

## 2024-12-18 DIAGNOSIS — N40.1 BENIGN PROSTATIC HYPERPLASIA WITH URINARY RETENTION: Primary | ICD-10-CM
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
## 2025-01-14 ENCOUNTER — OFFICE VISIT (OUTPATIENT)
Dept: NEUROLOGY | Facility: CLINIC | Age: 75
End: 2025-01-14
Payer: MEDICARE

## 2025-01-14 VITALS
HEART RATE: 86 BPM | HEIGHT: 69 IN | OXYGEN SATURATION: 94 % | TEMPERATURE: 99.2 F | BODY MASS INDEX: 39.4 KG/M2 | WEIGHT: 266 LBS | DIASTOLIC BLOOD PRESSURE: 74 MMHG | SYSTOLIC BLOOD PRESSURE: 136 MMHG

## 2025-01-14 DIAGNOSIS — E11.65 TYPE 2 DIABETES MELLITUS WITH HYPERGLYCEMIA, WITHOUT LONG-TERM CURRENT USE OF INSULIN: ICD-10-CM

## 2025-01-14 DIAGNOSIS — I10 ESSENTIAL HYPERTENSION: ICD-10-CM

## 2025-01-14 DIAGNOSIS — Z86.73 HISTORY OF STROKE: Primary | ICD-10-CM

## 2025-01-14 PROCEDURE — 3075F SYST BP GE 130 - 139MM HG: CPT | Performed by: NURSE PRACTITIONER

## 2025-01-14 PROCEDURE — 3078F DIAST BP <80 MM HG: CPT | Performed by: NURSE PRACTITIONER

## 2025-01-14 PROCEDURE — 99214 OFFICE O/P EST MOD 30 MIN: CPT | Performed by: NURSE PRACTITIONER
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
## 2025-01-16 DIAGNOSIS — G47.34 NOCTURNAL HYPOXEMIA: ICD-10-CM

## 2025-01-16 DIAGNOSIS — G47.33 OSA (OBSTRUCTIVE SLEEP APNEA): ICD-10-CM

## 2025-02-04 ENCOUNTER — OFFICE VISIT (OUTPATIENT)
Dept: FAMILY MEDICINE CLINIC | Facility: CLINIC | Age: 75
End: 2025-02-04
Payer: MEDICARE

## 2025-02-04 VITALS
HEIGHT: 69 IN | DIASTOLIC BLOOD PRESSURE: 82 MMHG | OXYGEN SATURATION: 99 % | SYSTOLIC BLOOD PRESSURE: 142 MMHG | TEMPERATURE: 97.8 F | BODY MASS INDEX: 39.4 KG/M2 | HEART RATE: 68 BPM | WEIGHT: 266 LBS | RESPIRATION RATE: 16 BRPM

## 2025-02-04 DIAGNOSIS — E11.65 TYPE 2 DIABETES MELLITUS WITH HYPERGLYCEMIA, WITHOUT LONG-TERM CURRENT USE OF INSULIN: ICD-10-CM

## 2025-02-04 DIAGNOSIS — E03.8 SUBCLINICAL HYPOTHYROIDISM: ICD-10-CM

## 2025-02-04 DIAGNOSIS — I10 ESSENTIAL HYPERTENSION: ICD-10-CM

## 2025-02-04 DIAGNOSIS — E11.41 TYPE 2 DIABETES MELLITUS WITH DIABETIC MONONEUROPATHY, WITHOUT LONG-TERM CURRENT USE OF INSULIN: ICD-10-CM

## 2025-02-04 DIAGNOSIS — E66.01 CLASS 2 SEVERE OBESITY DUE TO EXCESS CALORIES WITH SERIOUS COMORBIDITY AND BODY MASS INDEX (BMI) OF 38.0 TO 38.9 IN ADULT: ICD-10-CM

## 2025-02-04 DIAGNOSIS — L60.2 NAIL DISORDER (ONYCHOGRYPHOSIS): ICD-10-CM

## 2025-02-04 DIAGNOSIS — G57.93 NEUROPATHY OF BOTH FEET: ICD-10-CM

## 2025-02-04 DIAGNOSIS — Z00.00 ANNUAL PHYSICAL EXAM: ICD-10-CM

## 2025-02-04 DIAGNOSIS — E66.812 CLASS 2 SEVERE OBESITY DUE TO EXCESS CALORIES WITH SERIOUS COMORBIDITY AND BODY MASS INDEX (BMI) OF 38.0 TO 38.9 IN ADULT: ICD-10-CM

## 2025-02-04 DIAGNOSIS — R80.9 MICROALBUMINURIA: ICD-10-CM

## 2025-02-04 DIAGNOSIS — Z00.00 MEDICARE ANNUAL WELLNESS VISIT, SUBSEQUENT: Primary | ICD-10-CM

## 2025-02-04 LAB
EXPIRATION DATE: NORMAL
Lab: NORMAL
POC CREATININE URINE: 200
POC MICROALBUMIN URINE: 80

## 2025-02-04 PROCEDURE — 1126F AMNT PAIN NOTED NONE PRSNT: CPT | Performed by: FAMILY MEDICINE

## 2025-02-04 PROCEDURE — 3077F SYST BP >= 140 MM HG: CPT | Performed by: FAMILY MEDICINE

## 2025-02-04 PROCEDURE — 99397 PER PM REEVAL EST PAT 65+ YR: CPT | Performed by: FAMILY MEDICINE

## 2025-02-04 PROCEDURE — 1159F MED LIST DOCD IN RCRD: CPT | Performed by: FAMILY MEDICINE

## 2025-02-04 PROCEDURE — 82044 UR ALBUMIN SEMIQUANTITATIVE: CPT | Performed by: FAMILY MEDICINE

## 2025-02-04 PROCEDURE — 1160F RVW MEDS BY RX/DR IN RCRD: CPT | Performed by: FAMILY MEDICINE

## 2025-02-04 PROCEDURE — 1170F FXNL STATUS ASSESSED: CPT | Performed by: FAMILY MEDICINE

## 2025-02-04 PROCEDURE — 3079F DIAST BP 80-89 MM HG: CPT | Performed by: FAMILY MEDICINE

## 2025-02-04 PROCEDURE — G0439 PPPS, SUBSEQ VISIT: HCPCS | Performed by: FAMILY MEDICINE

## 2025-02-04 RX ORDER — NAPROXEN 250 MG/1
250 TABLET ORAL 2 TIMES DAILY PRN
Qty: 60 TABLET | Refills: 2 | Status: SHIPPED | OUTPATIENT
Start: 2025-02-04

## 2025-02-04 RX ORDER — SEMAGLUTIDE 2.68 MG/ML
2 INJECTION, SOLUTION SUBCUTANEOUS WEEKLY
Qty: 3 ML | Refills: 11 | Status: SHIPPED | OUTPATIENT
Start: 2025-02-04

## 2025-02-04 RX ORDER — LISINOPRIL 40 MG/1
40 TABLET ORAL DAILY
Qty: 30 TABLET | Refills: 11 | Status: SHIPPED | OUTPATIENT
Start: 2025-02-04

## 2025-02-04 NOTE — ASSESSMENT & PLAN NOTE
Orders:    POC Microalbumin    Hemoglobin A1c    Lipid Panel    Comprehensive Metabolic Panel    CBC & Differential    Semaglutide, 2 MG/DOSE, (Ozempic, 2 MG/DOSE,) 8 MG/3ML solution pen-injector; Inject 2 mg under the skin into the appropriate area as directed 1 (One) Time Per Week.
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
## 2025-02-04 NOTE — ASSESSMENT & PLAN NOTE
Patient's (Body mass index is 39.26 kg/m².) indicates that they are  with health conditions that include  . Weight is . BMI  . We discussed .     Orders:    Comprehensive Metabolic Panel    CBC & Differential

## 2025-02-04 NOTE — ASSESSMENT & PLAN NOTE
Orders:    Comprehensive Metabolic Panel    CBC & Differential    lisinopril (PRINIVIL,ZESTRIL) 40 MG tablet; Take 1 tablet by mouth Daily.

## 2025-02-05 DIAGNOSIS — E11.65 TYPE 2 DIABETES MELLITUS WITH HYPERGLYCEMIA, WITHOUT LONG-TERM CURRENT USE OF INSULIN: Primary | ICD-10-CM

## 2025-02-05 LAB
ALBUMIN SERPL-MCNC: 4 G/DL (ref 3.5–5.2)
ALBUMIN/GLOB SERPL: 1.6 G/DL
ALP SERPL-CCNC: 91 U/L (ref 39–117)
ALT SERPL-CCNC: 15 U/L (ref 1–41)
AST SERPL-CCNC: 14 U/L (ref 1–40)
BASOPHILS # BLD AUTO: 0.06 10*3/MM3 (ref 0–0.2)
BASOPHILS NFR BLD AUTO: 0.6 % (ref 0–1.5)
BILIRUB SERPL-MCNC: 0.8 MG/DL (ref 0–1.2)
BUN SERPL-MCNC: 11 MG/DL (ref 8–23)
BUN/CREAT SERPL: 11.6 (ref 7–25)
CALCIUM SERPL-MCNC: 9.5 MG/DL (ref 8.6–10.5)
CHLORIDE SERPL-SCNC: 102 MMOL/L (ref 98–107)
CHOLEST SERPL-MCNC: 102 MG/DL (ref 0–200)
CO2 SERPL-SCNC: 20.4 MMOL/L (ref 22–29)
CREAT SERPL-MCNC: 0.95 MG/DL (ref 0.76–1.27)
EGFRCR SERPLBLD CKD-EPI 2021: 85 ML/MIN/1.73
EOSINOPHIL # BLD AUTO: 0.14 10*3/MM3 (ref 0–0.4)
EOSINOPHIL NFR BLD AUTO: 1.3 % (ref 0.3–6.2)
ERYTHROCYTE [DISTWIDTH] IN BLOOD BY AUTOMATED COUNT: 12.7 % (ref 12.3–15.4)
GLOBULIN SER CALC-MCNC: 2.5 GM/DL
GLUCOSE SERPL-MCNC: 168 MG/DL (ref 65–99)
HBA1C MFR BLD: 9.2 % (ref 4.8–5.6)
HCT VFR BLD AUTO: 41.8 % (ref 37.5–51)
HDLC SERPL-MCNC: 28 MG/DL (ref 40–60)
HGB BLD-MCNC: 14.6 G/DL (ref 13–17.7)
IMM GRANULOCYTES # BLD AUTO: 0.04 10*3/MM3 (ref 0–0.05)
IMM GRANULOCYTES NFR BLD AUTO: 0.4 % (ref 0–0.5)
LDLC SERPL CALC-MCNC: 47 MG/DL (ref 0–100)
LYMPHOCYTES # BLD AUTO: 1.74 10*3/MM3 (ref 0.7–3.1)
LYMPHOCYTES NFR BLD AUTO: 16.2 % (ref 19.6–45.3)
MCH RBC QN AUTO: 31.3 PG (ref 26.6–33)
MCHC RBC AUTO-ENTMCNC: 34.9 G/DL (ref 31.5–35.7)
MCV RBC AUTO: 89.5 FL (ref 79–97)
MONOCYTES # BLD AUTO: 0.97 10*3/MM3 (ref 0.1–0.9)
MONOCYTES NFR BLD AUTO: 9 % (ref 5–12)
NEUTROPHILS # BLD AUTO: 7.79 10*3/MM3 (ref 1.7–7)
NEUTROPHILS NFR BLD AUTO: 72.5 % (ref 42.7–76)
NRBC BLD AUTO-RTO: 0 /100 WBC (ref 0–0.2)
PLATELET # BLD AUTO: 203 10*3/MM3 (ref 140–450)
POTASSIUM SERPL-SCNC: 4.5 MMOL/L (ref 3.5–5.2)
PROT SERPL-MCNC: 6.5 G/DL (ref 6–8.5)
PSA SERPL-MCNC: 1.48 NG/ML (ref 0–4)
RBC # BLD AUTO: 4.67 10*6/MM3 (ref 4.14–5.8)
SODIUM SERPL-SCNC: 136 MMOL/L (ref 136–145)
T4 FREE SERPL-MCNC: 1.02 NG/DL (ref 0.93–1.7)
TRIGL SERPL-MCNC: 159 MG/DL (ref 0–150)
TSH SERPL DL<=0.005 MIU/L-ACNC: 4.54 UIU/ML (ref 0.27–4.2)
VIT B12 SERPL-MCNC: 244 PG/ML (ref 211–946)
VLDLC SERPL CALC-MCNC: 27 MG/DL (ref 5–40)
WBC # BLD AUTO: 10.74 10*3/MM3 (ref 3.4–10.8)

## 2025-02-05 RX ORDER — DAPAGLIFLOZIN 5 MG/1
5 TABLET, FILM COATED ORAL DAILY
Qty: 30 TABLET | Refills: 2 | Status: SHIPPED | OUTPATIENT
Start: 2025-02-05

## 2025-02-20 ENCOUNTER — OFFICE VISIT (OUTPATIENT)
Dept: PAIN MEDICINE | Facility: CLINIC | Age: 75
End: 2025-02-20
Payer: OTHER MISCELLANEOUS

## 2025-02-20 VITALS — BODY MASS INDEX: 39.4 KG/M2 | HEIGHT: 69 IN | WEIGHT: 266 LBS

## 2025-02-20 DIAGNOSIS — M99.41 CONNECTIVE TISSUE STENOSIS OF NEURAL CANAL OF CERVICAL REGION: ICD-10-CM

## 2025-02-20 DIAGNOSIS — E11.42 DIABETIC POLYNEUROPATHY ASSOCIATED WITH TYPE 2 DIABETES MELLITUS: ICD-10-CM

## 2025-02-20 DIAGNOSIS — M47.812 CERVICAL SPONDYLOSIS WITHOUT MYELOPATHY: ICD-10-CM

## 2025-02-20 DIAGNOSIS — E66.9 OBESITY (BMI 30-39.9): ICD-10-CM
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
## 2025-02-20 DIAGNOSIS — M50.30 DDD (DEGENERATIVE DISC DISEASE), CERVICAL: ICD-10-CM

## 2025-02-20 DIAGNOSIS — M54.12 CERVICAL RADICULOPATHY: ICD-10-CM

## 2025-02-20 NOTE — PROGRESS NOTES
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 confirm appropriate needle placement and spread of contrast dye. We may repeat cervical epidural steroid injection by left paramedian interlaminar versus diagnostic and therapeutic left C5-C6 transforaminal epidural steroid injection depending on patient's outcome.  As per current guidelines, epidurals will be limited to a maximum of 4 sessions per spinal region in a rolling twelve (12) month period. Continuation of epidural steroid injections over 12 months would only be considered under the following provisions;  Patient is a high-risk surgical candidate, or the patient does not desire surgery, or recurrence of pain in the same location relieved with ESIs for at least three months and epidural provides at least 50% sustained improvement of pain and/or 50% objective improvement in function (using same scale as baseline)  Pain is severe enough to cause a significant degree of functional disability or vocational disability  The primary care provider will be notified regarding continuation of procand condition. Therefore, I have proposed the following plan:edures and repeat steroid use   Patient will follow-up with Dr. Silver thereafter for possible multilevel cervical discectomy and fusion centering around the C5-C6   2.  Diagnostics: He  will keep me updated on his left leg numbness and weakness.  If this continues, I have recommended MRI of the lumbar spine without contrast to further investigate the origin of this complaint.  3. Pharmacological measures: Reviewed and discussed; Patient takes Naproxen. Failed cyclobenzaprine, Naprosyn, Percocet, Lyrica gabapentin   4. Long-term rehabilitation efforts:  A. The patient does not have a history of falls. I did complete a risk assessment for falls.   B. Patient will start a comprehensive physical therapy program for upper body strengthening/posture correction, gait and balance training, neurodynamics, ASTYM, E-STIM, myofascial release, cupping, dry needling, home exercise program, if pain recurs  C. Start an exercise program such as yoga and daily walks for fitness, Continue gym regimen  D. Contrast therapy: Apply ice-packs for 15-20 minutes, followed by heating pads for 15-20 minutes to affected area   5. The patient has been instructed to contact my office with any questions or difficulties. The patient understands the plan and agrees to proceed accordingly    Pain Medications               aspirin 325 MG EC tablet Take 1 tablet by mouth Daily. To start on 3/31/24    cyclobenzaprine (FLEXERIL) 10 MG tablet Take 1 tablet by mouth 2 (Two) Times a Day As Needed for Muscle Spasms.    naproxen (NAPROSYN) 250 MG tablet Take 1 tablet by mouth 2 (Two) Times a Day As Needed for Mild Pain.          Cj Cifuentes reports a pain score of 8.  Given his pain assessment as noted, treatment options were discussed and the following options were decided upon as a follow-up plan to address the patient's pain: continuation of current treatment plan for pain, home exercises and therapy, referral to Physical Therapy, steroid injections, and use of non-medical modalities (ice, heat, stretching and/or behavior modifications).         Please note that portions of this note were completed with a voice recognition program.      ROCIO Chacon    Patient Care Team:  Gary Kaur MD as PCP - General (Family Medicine)  Enrique Madrigal MD as Consulting Physician (Pain Medicine)  Gauri Simms APRN as Nurse Practitioner (Nurse Practitioner)     No orders of the defined types were placed in this encounter.        Future Appointments   Date Time Provider Department Center   5/6/2025  8:45 AM Gary Kaur MD [facility]   11/4/2025  8:30 AM Colette Mckeon APRN [facility]   12/19/2025 10:10 AM Tuyet Stinson MD [facility]   1/13/2026  9:30 AM Jennifer Busch APRN [facility]

## 2025-02-24 ENCOUNTER — TELEPHONE (OUTPATIENT)
Dept: FAMILY MEDICINE CLINIC | Facility: CLINIC | Age: 75
End: 2025-02-24
Payer: MEDICARE

## 2025-02-24 NOTE — TELEPHONE ENCOUNTER
Caller: jC Cifuentes    Relationship: Self    Best call back number: [phone]    What is the best time to reach you:     Who are you requesting to speak with (clinical staff, provider,  specific staff member):     CLINICAL    What was the call regarding:     PATIENT NEEDS TO KNOW WHEN HE STARTED SEEING DR HUDSON IN [place]

## 2025-03-23 DIAGNOSIS — I63.9 CEREBROVASCULAR ACCIDENT (CVA), UNSPECIFIED MECHANISM: ICD-10-CM

## 2025-03-24 RX ORDER — ASPIRIN 325 MG
325 TABLET, DELAYED RELEASE (ENTERIC COATED) ORAL DAILY
Qty: 90 TABLET | Refills: 3 | Status: SHIPPED | OUTPATIENT
Start: 2025-03-24

## 2025-04-15 ENCOUNTER — TELEPHONE (OUTPATIENT)
Dept: FAMILY MEDICINE CLINIC | Facility: CLINIC | Age: 75
End: 2025-04-15
Payer: MEDICARE

## 2025-04-15 NOTE — TELEPHONE ENCOUNTER
Caller: Cj Cifuentes    Relationship: Self    Best call back number: [phone]     What was the call regarding: PATIENT HAD SHINGLES 2 WEEKS AGO, PATIENT WOULD LIKE TO KNOW IF HE CAN GET THE SHINGLES VAX. PLEASE ADVISE.     Is it okay if the provider responds through MyChart: NO

## 2025-05-04 DIAGNOSIS — E11.65 TYPE 2 DIABETES MELLITUS WITH HYPERGLYCEMIA, WITHOUT LONG-TERM CURRENT USE OF INSULIN: ICD-10-CM

## 2025-05-06 ENCOUNTER — OFFICE VISIT (OUTPATIENT)
Dept: FAMILY MEDICINE CLINIC | Facility: CLINIC | Age: 75
End: 2025-05-06
Payer: MEDICARE

## 2025-05-06 VITALS
DIASTOLIC BLOOD PRESSURE: 68 MMHG | RESPIRATION RATE: 20 BRPM | HEART RATE: 54 BPM | HEIGHT: 69 IN | OXYGEN SATURATION: 95 % | WEIGHT: 248.4 LBS | BODY MASS INDEX: 36.79 KG/M2 | TEMPERATURE: 97.1 F | SYSTOLIC BLOOD PRESSURE: 132 MMHG

## 2025-05-06 DIAGNOSIS — I10 ESSENTIAL HYPERTENSION: ICD-10-CM

## 2025-05-06 DIAGNOSIS — E11.65 TYPE 2 DIABETES MELLITUS WITH HYPERGLYCEMIA, WITHOUT LONG-TERM CURRENT USE OF INSULIN: Primary | ICD-10-CM

## 2025-05-06 LAB
EXPIRATION DATE: ABNORMAL
HBA1C MFR BLD: 7.8 % (ref 4.5–5.7)
Lab: ABNORMAL

## 2025-05-06 RX ORDER — VALACYCLOVIR HYDROCHLORIDE 500 MG/1
500 TABLET, FILM COATED ORAL DAILY
COMMUNITY
Start: 2025-04-29 | End: 2025-05-06

## 2025-05-06 RX ORDER — DAPAGLIFLOZIN 5 MG/1
5 TABLET, FILM COATED ORAL DAILY
Qty: 90 TABLET | Refills: 2 | Status: SHIPPED | OUTPATIENT
Start: 2025-05-06

## 2025-05-06 RX ORDER — METFORMIN HYDROCHLORIDE 500 MG/1
500 TABLET, EXTENDED RELEASE ORAL
Qty: 90 TABLET | Refills: 0 | Status: SHIPPED | OUTPATIENT
Start: 2025-05-06

## 2025-05-06 RX ORDER — DAPAGLIFLOZIN 5 MG/1
5 TABLET, FILM COATED ORAL DAILY
Qty: 30 TABLET | Refills: 2 | OUTPATIENT
Start: 2025-05-06

## 2025-05-06 RX ORDER — ATORVASTATIN CALCIUM 80 MG/1
80 TABLET, FILM COATED ORAL NIGHTLY
COMMUNITY
Start: 2025-03-03

## 2025-05-06 NOTE — PROGRESS NOTES
"Chief Complaint   Patient presents with    3 mo f/u     DM, Pt is not fasting.       Subjective      Cj Cifuentes is a 74 y.o. who presents for chronic care of diabetes and hypertension.  Regarding his diabetes he takes his metformin  mg about twice per week out of fear of hypoglycemia.  Patient has multiple documented episodes of hypoglycemia that generally occur within an hour of taking his metformin.  He will only take the metformin if his blood sugar is greater than 150.    Answers submitted by the patient for this visit:  Diabetes Questionnaire (Submitted on 5/5/2025)  Chief Complaint: Diabetes problem  Diabetes type: type 2  MedicAlert ID: No  Treatment compliance: all of the time  Symptom course: improving  Home blood tests: 3-4 x per day  High score:   Below 70: every several days  foot paresthesias: No  foot ulcerations: No  headaches: No  sweats: No  Current diet: generally healthy, low fiber, low salt  Meal planning: avoidance of concentrated sweets, low carbohydrate diet  Exercise: some days  Eye exam current: Yes  Sees podiatrist: No  Additional information: Normally  around 80to 130      The following portions of the patient's history were reviewed and updated as appropriate: allergies, current medications, past family history, past medical history, past social history, past surgical history, and problem list.    Review of Systems    Objective   Vital Signs:  /68   Pulse 54   Temp 97.1 °F (36.2 °C)   Resp 20   Ht 175.3 cm (69\")   Wt 113 kg (248 lb 6.4 oz)   SpO2 95%   BMI 36.68 kg/m²               Physical Exam  Vitals reviewed.   Constitutional:       Appearance: Normal appearance.   Neurological:      Mental Status: He is alert.          Result Review   The following data was reviewed by: Gary Kaur MD on 05/06/2025:  Most Recent A1C          5/6/2025    08:52   HGBA1C Most Recent   Hemoglobin A1C 7.8                    Assessment and Plan  Diagnoses and all " orders for this visit:    1. Type 2 diabetes mellitus with hyperglycemia, without long-term current use of insulin (Primary)  Comments:  Control is improved but is not at goal.  Change metformin to extended release form with goal of the daily use.  Consider increase if tolerated  Orders:  -     POC Glycosylated Hemoglobin (Hb A1C)  -     metFORMIN ER (GLUCOPHAGE-XR) 500 MG 24 hr tablet; Take 1 tablet by mouth Daily With Breakfast.  Dispense: 90 tablet; Refill: 0  -     dapagliflozin (Farxiga) 5 MG tablet tablet; Take 1 tablet by mouth Daily.  Dispense: 90 tablet; Refill: 2    2. Essential hypertension  Comments:  Blood pressure remains controlled.  Continue lisinopril and Farxiga.            Follow Up  Return in about 3 months (around 8/7/2025) for Next scheduled follow up Diabetes, Hypertension.  Patient was given instructions and counseling regarding his condition or for health maintenance advice. Please see specific information pulled into the AVS if appropriate.

## 2025-05-08 DIAGNOSIS — E11.65 TYPE 2 DIABETES MELLITUS WITH HYPERGLYCEMIA, WITHOUT LONG-TERM CURRENT USE OF INSULIN: ICD-10-CM

## 2025-05-08 NOTE — TELEPHONE ENCOUNTER
Caller: Esau Cj LISS    Relationship: Self    Best call back number: [phone]    Requested Prescriptions:   Requested Prescriptions     Pending Prescriptions Disp Refills    Continuous Glucose Sensor (FreeStyle Kera 2 Sensor) [place] [id number]        Pharmacy where request should be sent: [id number] - [address] [phone] [facility] [phone] FX     Last office visit with prescribing clinician: 5/6/2025   Last telemedicine visit with prescribing clinician: Visit date not found   Next office visit with prescribing clinician: 8/12/2025     Additional details provided by patient: THE PATIENT HAS ANOTHER SENSOR BUT WILL NEED A NEW PRESCRIPTION     Does the patient have less than a 3 day supply:  [] Yes  [x] No    Would you like a call back once the refill request has been completed: [] Yes [x] No    If the office needs to give you a call back, can they leave a voicemail: [] Yes [x] No    Keiry Kent Rep   05/08/25 14:20 EDT

## 2025-06-03 RX ORDER — ATORVASTATIN CALCIUM 80 MG/1
80 TABLET, FILM COATED ORAL
Qty: 90 TABLET | Refills: 3 | Status: SHIPPED | OUTPATIENT
Start: 2025-06-03

## 2025-06-24 ENCOUNTER — PATIENT OUTREACH (OUTPATIENT)
Dept: CASE MANAGEMENT | Facility: OTHER | Age: 75
End: 2025-06-24
Payer: MEDICARE

## 2025-06-24 NOTE — OUTREACH NOTE
Patient Outreach    AMBULATORY CASE MANAGEMENT NOTE    Names and Relationships of Patient/Support Persons: Contact: Cj Cifuentes; Relationship: Self -     Patient Outreach   RN-ACM outreach with patient regarding identified proactive HRCM review. Reviewed with patient role of RN-ACM and HRCM case management services. Patient verbalized understanding. Patient states to be compliant with medications and medical appointments. Patient states no difficulty with chest pain; SOB; appetite or sleeping. Patient states to use CGM and monitoring blood sugars stating values between 100 to 110. Patient states to have had episode of hypoglycemia last week improved with intake of orange juice. Reviewed with patient education and verbalized understanding. Patient states to appreciate outreach and declines needs for further outreach at this time. No further questions voiced at this time.   Adult Patient Profile  Questions/Answers      Flowsheet Row Most Recent Value   Symptoms/Conditions Managed at Home diabetes, type 2   Diabetes Management Strategies medication therapy, blood glucose testing, other (see comments)  [Physician follow up]   Barriers to Taking Medication as Prescribed none   Equipment Currently Used at Home cane, straight, other (see comments)  [CGM]   Primary Source of Support/Comfort spouse   People in Home spouse        Social Work Assessment  Questions/Answers      Flowsheet Row Most Recent Value   People in Home spouse   Functional Status Comments Patient states to be independent with ADL's,  transportation and ambulating with cane as needed   Equipment Currently Used at Home cane, straight, other (see comments)  [CGM]        Send Education  Questions/Answers      Flowsheet Row Most Recent Value   Annual Wellness Visit:  Patient Has Completed   Other Patient Education/Resources  Jovanny MORALES updated and reviewed with the patient during this program:  --     Food Insecurity: No Food Insecurity  (6/24/2025)    Hunger Vital Sign     Worried About Running Out of Food in the Last Year: Never true     Ran Out of Food in the Last Year: Never true      --     Transportation Needs: No Transportation Needs (6/24/2025)    PRAPARE - Transportation     Lack of Transportation (Medical): No     Lack of Transportation (Non-Medical): No       Education Documentation  Follow-Up Care, taught by Jyoti Crenshaw RN at 6/24/2025  3:16 PM.  Learner: Patient  Readiness: Acceptance  Method: Explanation  Response: Verbalizes Understanding    Hypoglycemia Identification and Treatment, taught by Jyoti Crenshaw RN at 6/24/2025  3:16 PM.  Learner: Patient  Readiness: Acceptance  Method: Explanation  Response: Verbalizes Understanding    Continuous Glucose Monitoring (CGM), taught by Jyoti Crenshaw RN at 6/24/2025  3:16 PM.  Learner: Patient  Readiness: Acceptance  Method: Explanation  Response: Verbalizes Understanding    Safety, taught by Jyoti Crenshaw RN at 6/24/2025  3:16 PM.  Learner: Patient  Readiness: Acceptance  Method: Explanation  Response: Verbalizes Understanding    Home Safety, taught by Jyoti Crenshaw RN at 6/24/2025  3:16 PM.  Learner: Patient  Readiness: Acceptance  Method: Explanation  Response: Verbalizes Understanding    Energy Conservation, taught by Jyoti Crenshaw RN at 6/24/2025  3:16 PM.  Learner: Patient  Readiness: Acceptance  Method: Explanation  Response: Verbalizes Understanding          Jyoti RODRIGUEZ  Ambulatory Case Management    6/24/2025, 15:17 EDT

## 2025-06-30 ENCOUNTER — TELEPHONE (OUTPATIENT)
Dept: FAMILY MEDICINE CLINIC | Facility: CLINIC | Age: 75
End: 2025-06-30

## 2025-06-30 ENCOUNTER — TELEPHONE (OUTPATIENT)
Dept: FAMILY MEDICINE CLINIC | Facility: CLINIC | Age: 75
End: 2025-06-30
Payer: MEDICARE

## 2025-06-30 DIAGNOSIS — E11.65 TYPE 2 DIABETES MELLITUS WITH HYPERGLYCEMIA, WITHOUT LONG-TERM CURRENT USE OF INSULIN: Primary | ICD-10-CM

## 2025-06-30 LAB — GLUCOSE BLDC GLUCOMTR-MCNC: 208 MG/DL (ref 70–130)

## 2025-06-30 PROCEDURE — 82948 REAGENT STRIP/BLOOD GLUCOSE: CPT | Performed by: FAMILY MEDICINE

## 2025-06-30 NOTE — TELEPHONE ENCOUNTER
Pt stated he had a 69 glucose on his Freestyle. He ate a big breakfast with pancakes and curtis, and his sugar went up to 109. About 20 min after this reading, his sugar dropped to 64. He stated this was happening yesterday as well, where he's getting low readings, then having something sweet to get it back up, then not getting any higher.   Pt did not check his sugar manually due to not having supplies at home. Informed him to come by the office so that we can do a glucose check, and compare it to the readings his Freestyle is giving at the time he's in office.

## 2025-06-30 NOTE — TELEPHONE ENCOUNTER
Tried to reach pt to inform him that Dr. Kaur feels he has a bad sensor on and he should go ahead and change it as soon as he gets home, however, no answer and his voicemail is full.

## 2025-07-01 DIAGNOSIS — E11.65 TYPE 2 DIABETES MELLITUS WITH HYPERGLYCEMIA, WITHOUT LONG-TERM CURRENT USE OF INSULIN: ICD-10-CM

## 2025-07-01 NOTE — TELEPHONE ENCOUNTER
Caller: Cj Cifuentes    Relationship: Self    Best call back number: [phone]       What was the call regarding: PATIENT STATES HE WAS CONTACTED BY THE PHARMACY AND TOLD THAT HIS SENSORS ARE READY FOR PICKUP. HE STATES HE HAS SENSORS, BUT HIS DEVICE HAS GONE BAD. HE HAS HAD IT FOR ABOUT 3 YEARS NOW AND NEEDS A NEW UNIT. PLEASE MAKE SURE IT IS THE DEVICE THAT GETS CALLED IN FOR HIM. HE IS UNABLE TO CHECK HIS BLOOD SUGAR AT THE MOMENT.

## 2025-07-01 NOTE — TELEPHONE ENCOUNTER
Caller: Esau Cj LEIJA    Relationship: Self    Best call back number: [phone]     Requested Prescriptions:   Requested Prescriptions     Pending Prescriptions Disp Refills    Continuous Glucose  (FreeStyle Kera 2 [place]) device          Pharmacy where request should be sent: [id number] [place], [place] - [address] [facility] AT [facility] [phone] [facility] [phone] FX     Last office visit with prescribing clinician: 5/6/2025   Last telemedicine visit with prescribing clinician: Visit date not found   Next office visit with prescribing clinician: 8/12/2025     Additional details provided by patient: DEVICE NO LONGER WORKING AND NEEDS A REPLACEMENT     Does the patient have less than a 3 day supply:  [x] Yes  [] No    Would you like a call back once the refill request has been completed: [] Yes [x] No    If the office needs to give you a call back, can they leave a voicemail: [] Yes [x] No    Keiry Chavez Rep   07/01/25 08:17 EDT

## 2025-07-02 NOTE — TELEPHONE ENCOUNTER
Spoke w/ pt and he said, his reader was bad as well. He replaced the sensor and his old reader still gave him low readings. We sent in a new reader and it is reading normal now.

## 2025-08-01 DIAGNOSIS — E11.65 TYPE 2 DIABETES MELLITUS WITH HYPERGLYCEMIA, WITHOUT LONG-TERM CURRENT USE OF INSULIN: ICD-10-CM

## 2025-08-01 RX ORDER — METFORMIN HYDROCHLORIDE 500 MG/1
500 TABLET, EXTENDED RELEASE ORAL
Qty: 90 TABLET | Refills: 0 | Status: SHIPPED | OUTPATIENT
Start: 2025-08-01

## 2025-08-04 ENCOUNTER — TELEPHONE (OUTPATIENT)
Dept: FAMILY MEDICINE CLINIC | Facility: CLINIC | Age: 75
End: 2025-08-04
Payer: MEDICARE

## 2025-08-12 ENCOUNTER — OFFICE VISIT (OUTPATIENT)
Dept: FAMILY MEDICINE CLINIC | Facility: CLINIC | Age: 75
End: 2025-08-12
Payer: MEDICARE

## 2025-08-12 VITALS
RESPIRATION RATE: 18 BRPM | SYSTOLIC BLOOD PRESSURE: 134 MMHG | TEMPERATURE: 97.5 F | DIASTOLIC BLOOD PRESSURE: 80 MMHG | WEIGHT: 248 LBS | HEIGHT: 69 IN | HEART RATE: 68 BPM | BODY MASS INDEX: 36.73 KG/M2

## 2025-08-12 DIAGNOSIS — I63.9 CEREBROVASCULAR ACCIDENT (CVA), UNSPECIFIED MECHANISM: ICD-10-CM

## 2025-08-12 DIAGNOSIS — E11.65 TYPE 2 DIABETES MELLITUS WITH HYPERGLYCEMIA, WITHOUT LONG-TERM CURRENT USE OF INSULIN: Primary | ICD-10-CM

## 2025-08-12 DIAGNOSIS — E03.8 SUBCLINICAL HYPOTHYROIDISM: ICD-10-CM

## 2025-08-12 DIAGNOSIS — I10 ESSENTIAL HYPERTENSION: ICD-10-CM

## 2025-08-12 DIAGNOSIS — R80.9 TYPE 2 DIABETES MELLITUS WITH DIABETIC MICROALBUMINURIA, WITHOUT LONG-TERM CURRENT USE OF INSULIN: ICD-10-CM

## 2025-08-12 DIAGNOSIS — E11.29 TYPE 2 DIABETES MELLITUS WITH DIABETIC MICROALBUMINURIA, WITHOUT LONG-TERM CURRENT USE OF INSULIN: ICD-10-CM

## 2025-08-12 LAB
EXPIRATION DATE: ABNORMAL
HBA1C MFR BLD: 7.5 % (ref 4.5–5.7)
Lab: ABNORMAL

## 2025-08-12 RX ORDER — METFORMIN HYDROCHLORIDE 500 MG/1
1000 TABLET, EXTENDED RELEASE ORAL
Qty: 180 TABLET | Refills: 0 | Status: SHIPPED | OUTPATIENT
Start: 2025-08-12

## 2025-08-12 RX ORDER — ASPIRIN 81 MG/1
81 TABLET ORAL DAILY
Start: 2025-08-12

## 2025-08-25 ENCOUNTER — OFFICE VISIT (OUTPATIENT)
Dept: FAMILY MEDICINE CLINIC | Facility: CLINIC | Age: 75
End: 2025-08-25
Payer: MEDICARE

## 2025-08-25 ENCOUNTER — TELEPHONE (OUTPATIENT)
Dept: FAMILY MEDICINE CLINIC | Facility: CLINIC | Age: 75
End: 2025-08-25

## 2025-08-25 VITALS
BODY MASS INDEX: 36.85 KG/M2 | RESPIRATION RATE: 20 BRPM | DIASTOLIC BLOOD PRESSURE: 72 MMHG | WEIGHT: 248.8 LBS | HEART RATE: 80 BPM | TEMPERATURE: 98.2 F | OXYGEN SATURATION: 97 % | HEIGHT: 69 IN | SYSTOLIC BLOOD PRESSURE: 140 MMHG

## 2025-08-25 DIAGNOSIS — Z23 NEED FOR IMMUNIZATION AGAINST TETANUS ALONE: ICD-10-CM

## 2025-08-25 DIAGNOSIS — K92.2 LOWER GI BLEED: ICD-10-CM

## 2025-08-25 DIAGNOSIS — K64.8 BLEEDING INTERNAL HEMORRHOIDS: ICD-10-CM

## 2025-08-25 DIAGNOSIS — S80.812A ABRASION OF LEFT LOWER EXTREMITY, INITIAL ENCOUNTER: Primary | ICD-10-CM

## 2025-08-25 DIAGNOSIS — K57.90 DIVERTICULOSIS: ICD-10-CM

## 2025-08-25 PROCEDURE — 1125F AMNT PAIN NOTED PAIN PRSNT: CPT | Performed by: FAMILY MEDICINE

## 2025-08-25 PROCEDURE — 3077F SYST BP >= 140 MM HG: CPT | Performed by: FAMILY MEDICINE

## 2025-08-25 PROCEDURE — 99214 OFFICE O/P EST MOD 30 MIN: CPT | Performed by: FAMILY MEDICINE

## 2025-08-25 PROCEDURE — 90714 TD VACC NO PRESV 7 YRS+ IM: CPT | Performed by: FAMILY MEDICINE

## 2025-08-25 PROCEDURE — 90471 IMMUNIZATION ADMIN: CPT | Performed by: FAMILY MEDICINE

## 2025-08-25 PROCEDURE — 3078F DIAST BP <80 MM HG: CPT | Performed by: FAMILY MEDICINE

## 2025-08-25 PROCEDURE — 3051F HG A1C>EQUAL 7.0%<8.0%: CPT | Performed by: FAMILY MEDICINE

## (undated) DEVICE — SOL ANTISTICK CAUTRY ELECTROLUBE LF

## (undated) DEVICE — CANNULA SEAL

## (undated) DEVICE — BLANKT WARM UPPR/BDY ARM/OUT 57X196CM

## (undated) DEVICE — SUT MNCRYL PLS ANTIB UD 4/0 PS2 18IN

## (undated) DEVICE — SUT PROLN 2/0 KS 30IN 8623H

## (undated) DEVICE — 3M™ IOBAN™ 2 ANTIMICROBIAL INCISE DRAPE 6650EZ: Brand: IOBAN™ 2

## (undated) DEVICE — APPL HEMOS FOR DELIVERY FLOSEAL

## (undated) DEVICE — SUT VIC 0 TIES 18IN J912G

## (undated) DEVICE — Device

## (undated) DEVICE — TBG PENCL TELESCP MEGADYNE SMOKE EVAC 10FT

## (undated) DEVICE — ENDOPATH XCEL BLADELESS TROCARS WITH STABILITY SLEEVES: Brand: ENDOPATH XCEL

## (undated) DEVICE — TIP COVER ACCESSORY

## (undated) DEVICE — ENDOPOUCH RETRIEVER SPECIMEN RETRIEVAL BAGS: Brand: ENDOPOUCH RETRIEVER

## (undated) DEVICE — UNDERGLV SURG BIOGEL INDICAT PF 8 GRN

## (undated) DEVICE — ANTIBACTERIAL UNDYED BRAIDED (POLYGLACTIN 910), SYNTHETIC ABSORBABLE SUTURE: Brand: COATED VICRYL

## (undated) DEVICE — SYR LUERLOK 30CC

## (undated) DEVICE — DRSNG WND BORDR/ADHS NONADHR/GZ LF 4X4IN STRL

## (undated) DEVICE — PK UROL DAVINCI 10

## (undated) DEVICE — HLDR CATH FOLEY STATLOCK TRICOT PED 3WY

## (undated) DEVICE — GLV SURG SENSICARE PI MIC PF SZ7.5 LF STRL

## (undated) DEVICE — TRY L/P SFTY A/20G

## (undated) DEVICE — ST TBG CONN PNEUMOCLEAR EVAC SMOKE HEAT/HUMID

## (undated) DEVICE — LEX PROSTATE ACCESSORY PACK: Brand: MEDLINE INDUSTRIES, INC.

## (undated) DEVICE — LAP PORT CLOSURE GUIDES 5MM AND 10/12MM: Brand: LAP PORT CLOSURE GUIDES 5MM AND 10/12MM

## (undated) DEVICE — CATH FOLEY LUBRICATH 3WY 22F 30CC

## (undated) DEVICE — Y-TYPE TUR/BLADDER IRRIGATION SET, REGULATING CLAMP

## (undated) DEVICE — NDL SPINE 22G 31/2IN BLK

## (undated) DEVICE — SUT SILK 2/0 PS 18IN 1588H

## (undated) DEVICE — ARM DRAPE

## (undated) DEVICE — LAPAROVUE VISIBILITY SYSTEM LAPAROSCOPIC SOLUTIONS: Brand: LAPAROVUE

## (undated) DEVICE — SUT VIC 0 UR6 27IN VCP603H

## (undated) DEVICE — BLADELESS OBTURATOR: Brand: WECK VISTA

## (undated) DEVICE — 2, DISPOSABLE SUCTION/IRRIGATOR WITHOUT DISPOSABLE TIP: Brand: STRYKEFLOW

## (undated) DEVICE — GOWN,NON-REINFORCED,SIRUS,SET IN SLV,XL: Brand: MEDLINE

## (undated) DEVICE — UNDERGLV SURG BIOGEL INDICAT PF 71/2 GRN

## (undated) DEVICE — SUT VIC 0 UR5 27IN VCP376H

## (undated) DEVICE — COLUMN DRAPE

## (undated) DEVICE — ENDOPATH PNEUMONEEDLE INSUFFLATION NEEDLES WITH LUER LOCK CONNECTORS 120MM: Brand: ENDOPATH

## (undated) DEVICE — SUT PDS MONO 0 36 CT1 VIL PDP346H

## (undated) DEVICE — PATIENT RETURN ELECTRODE, SINGLE-USE, CONTACT QUALITY MONITORING, ADULT, WITH 9FT CORD, FOR PATIENTS WEIGING OVER 33LBS. (15KG): Brand: MEGADYNE